# Patient Record
Sex: FEMALE | Race: WHITE | NOT HISPANIC OR LATINO | Employment: UNEMPLOYED
[De-identification: names, ages, dates, MRNs, and addresses within clinical notes are randomized per-mention and may not be internally consistent; named-entity substitution may affect disease eponyms.]

---

## 2017-03-22 ENCOUNTER — HOSPITAL (OUTPATIENT)
Dept: OTHER | Age: 62
End: 2017-03-22
Attending: ALLERGY & IMMUNOLOGY

## 2017-03-22 LAB
25(OH)D3+25(OH)D2 SERPL-MCNC: 40.2 NG/ML (ref 30–100)
ALBUMIN SERPL-MCNC: 3.7 GM/DL (ref 3.6–5.1)
ALBUMIN/GLOB SERPL: 0.8 {RATIO} (ref 1–2.4)
ALP SERPL-CCNC: 95 UNIT/L (ref 45–117)
ALT SERPL-CCNC: 12 UNIT/L
AMORPH SED URNS QL MICRO: ABNORMAL
ANALYZER ANC (IANC): ABNORMAL
ANION GAP SERPL CALC-SCNC: 14 MMOL/L (ref 10–20)
APPEARANCE UR: CLEAR
AST SERPL-CCNC: 11 UNIT/L
BASOPHILS # BLD: 0 THOUSAND/MCL (ref 0–0.3)
BASOPHILS NFR BLD: 0 %
BILIRUB SERPL-MCNC: 0.4 MG/DL (ref 0.2–1)
BILIRUB UR QL: NEGATIVE
BUN SERPL-MCNC: 21 MG/DL (ref 6–20)
BUN/CREAT SERPL: 19 (ref 7–25)
CALCIUM SERPL-MCNC: 9.3 MG/DL (ref 8.4–10.2)
CAOX CRY URNS QL MICRO: ABNORMAL
CHLORIDE: 103 MMOL/L (ref 98–107)
CHOLEST SERPL-MCNC: 182 MG/DL
CHOLEST/HDLC SERPL: 3.9 {RATIO}
CO2 SERPL-SCNC: 26 MMOL/L (ref 21–32)
COLOR UR: ABNORMAL
CREAT SERPL-MCNC: 1.11 MG/DL (ref 0.51–0.95)
DIFFERENTIAL METHOD BLD: ABNORMAL
EOSINOPHIL # BLD: 0.3 THOUSAND/MCL (ref 0.1–0.5)
EOSINOPHIL NFR BLD: 2 %
EPITH CASTS #/AREA URNS LPF: ABNORMAL /[LPF]
ERYTHROCYTE [DISTWIDTH] IN BLOOD: 13.3 % (ref 11–15)
FATTY CASTS #/AREA URNS LPF: ABNORMAL /[LPF]
GLOBULIN SER-MCNC: 4.4 GM/DL (ref 2–4)
GLUCOSE SERPL-MCNC: 107 MG/DL (ref 65–99)
GLUCOSE UR-MCNC: NEGATIVE MG/DL
GLYCOHEMOGLOBIN: 5.9 % (ref 4.5–5.6)
GRAN CASTS #/AREA URNS LPF: ABNORMAL /[LPF]
HDLC SERPL-MCNC: 47 MG/DL
HEMATOCRIT: 34.1 % (ref 36–46.5)
HGB BLD-MCNC: 11.4 GM/DL (ref 12–15.5)
HGB UR QL: NEGATIVE
HYALINE CASTS #/AREA URNS LPF: ABNORMAL /[LPF]
KETONES UR-MCNC: NEGATIVE MG/DL
LDLC SERPL CALC-MCNC: 101 MG/DL
LENGTH OF FAST TIME PATIENT: ABNORMAL HR
LENGTH OF FAST TIME PATIENT: ABNORMAL HR
LEUKOCYTE ESTERASE UR QL STRIP: NEGATIVE
LYMPHOCYTES # BLD: 3.1 THOUSAND/MCL (ref 1–4)
LYMPHOCYTES NFR BLD: 23 %
MCH RBC QN AUTO: 27.1 PG (ref 26–34)
MCHC RBC AUTO-ENTMCNC: 33.4 GM/DL (ref 32–36.5)
MCV RBC AUTO: 81.2 FL (ref 78–100)
MICROSCOPIC (MT): ABNORMAL
MIXED CELL CASTS #/AREA URNS LPF: ABNORMAL /[LPF]
MONOCYTES # BLD: 0.6 THOUSAND/MCL (ref 0.3–0.9)
MONOCYTES NFR BLD: 4 %
MUCOUS THREADS URNS QL MICRO: ABNORMAL
NEUTROPHILS # BLD: 9.2 THOUSAND/MCL (ref 1.8–7.7)
NEUTROPHILS NFR BLD: 71 %
NEUTS SEG NFR BLD: ABNORMAL %
NITRITE UR QL: NEGATIVE
NONHDLC SERPL-MCNC: 135 MG/DL
PERCENT NRBC: ABNORMAL
PH UR: 5 UNIT (ref 5–7)
PLATELET # BLD: 286 THOUSAND/MCL (ref 140–450)
POTASSIUM SERPL-SCNC: 3.7 MMOL/L (ref 3.4–5.1)
PROT SERPL-MCNC: 8.1 GM/DL (ref 6.4–8.2)
PROT UR QL: NEGATIVE MG/DL
RBC # BLD: 4.2 MILLION/MCL (ref 4–5.2)
RBC CASTS #/AREA URNS LPF: ABNORMAL /[LPF]
RENAL EPI CELLS #/AREA URNS HPF: ABNORMAL /[HPF]
SODIUM SERPL-SCNC: 139 MMOL/L (ref 135–145)
SP GR UR: 1.01 (ref 1–1.03)
SPECIMEN SOURCE: ABNORMAL
SPERM URNS QL MICRO: ABNORMAL
T VAGINALIS URNS QL MICRO: ABNORMAL
T3 SERPL-MCNC: 0.91 NG/ML (ref 0.6–1.81)
T4 SERPL-MCNC: 7.9 MCG/DL (ref 4.7–13.3)
TACROLIMUS BLD-MCNC: 5 NG/ML (ref 5–20)
TRI-PHOS CRY URNS QL MICRO: ABNORMAL
TRIGLYCERIDE (TRIGP): 171 MG/DL
TSH SERPL-ACNC: 3.2 MCUNIT/ML (ref 0.35–5)
URATE CRY URNS QL MICRO: ABNORMAL
URNS CMNT MICRO: ABNORMAL
UROBILINOGEN UR QL: 0.2 MG/DL (ref 0–1)
WAXY CASTS #/AREA URNS LPF: ABNORMAL /[LPF]
WBC # BLD: 13.1 THOUSAND/MCL (ref 4.2–11)
WBC CASTS #/AREA URNS LPF: ABNORMAL /[LPF]
YEAST HYPHAE URNS QL MICRO: ABNORMAL
YEAST URNS QL MICRO: ABNORMAL

## 2017-06-20 ENCOUNTER — HOSPITAL (OUTPATIENT)
Dept: OTHER | Age: 62
End: 2017-06-20

## 2017-06-20 ENCOUNTER — CHARTING TRANS (OUTPATIENT)
Dept: OTHER | Age: 62
End: 2017-06-20

## 2017-07-18 ENCOUNTER — HOSPITAL (OUTPATIENT)
Dept: OTHER | Age: 62
End: 2017-07-18

## 2018-05-31 ENCOUNTER — HOSPITAL (OUTPATIENT)
Dept: OTHER | Age: 63
End: 2018-05-31
Attending: ALLERGY & IMMUNOLOGY

## 2018-05-31 LAB
25(OH)D3+25(OH)D2 SERPL-MCNC: 26.5 NG/ML (ref 30–100)
ALBUMIN SERPL-MCNC: 3.8 GM/DL (ref 3.6–5.1)
ALBUMIN/GLOB SERPL: 0.8 {RATIO} (ref 1–2.4)
ALP SERPL-CCNC: 101 UNIT/L (ref 45–117)
ALT SERPL-CCNC: 17 UNIT/L
AMORPH SED URNS QL MICRO: NORMAL
ANALYZER ANC (IANC): ABNORMAL
ANION GAP SERPL CALC-SCNC: 17 MMOL/L (ref 10–20)
APPEARANCE UR: CLEAR
AST SERPL-CCNC: 13 UNIT/L
BACTERIA #/AREA URNS HPF: NORMAL /HPF
BASOPHILS # BLD: 0 THOUSAND/MCL (ref 0–0.3)
BASOPHILS NFR BLD: 0 %
BILIRUB SERPL-MCNC: 0.6 MG/DL (ref 0.2–1)
BILIRUB UR QL: NEGATIVE
BUN SERPL-MCNC: 29 MG/DL (ref 6–20)
BUN/CREAT SERPL: 19 (ref 7–25)
CALCIUM SERPL-MCNC: 9.6 MG/DL (ref 8.4–10.2)
CAOX CRY URNS QL MICRO: NORMAL
CHLORIDE: 103 MMOL/L (ref 98–107)
CHOLEST SERPL-MCNC: 201 MG/DL
CHOLEST/HDLC SERPL: 4.5 {RATIO}
CO2 SERPL-SCNC: 24 MMOL/L (ref 21–32)
COLOR UR: YELLOW
CREAT SERPL-MCNC: 1.56 MG/DL (ref 0.51–0.95)
DIFFERENTIAL METHOD BLD: ABNORMAL
EOSINOPHIL # BLD: 0.3 THOUSAND/MCL (ref 0.1–0.5)
EOSINOPHIL NFR BLD: 2 %
EPITH CASTS #/AREA URNS LPF: NORMAL /[LPF]
ERYTHROCYTE [DISTWIDTH] IN BLOOD: 13.3 % (ref 11–15)
FATTY CASTS #/AREA URNS LPF: NORMAL /[LPF]
GLOBULIN SER-MCNC: 4.5 GM/DL (ref 2–4)
GLUCOSE SERPL-MCNC: 122 MG/DL (ref 65–99)
GLUCOSE UR-MCNC: NEGATIVE MG/DL
GLYCOHEMOGLOBIN: 5.6 % (ref 4.5–5.6)
GRAN CASTS #/AREA URNS LPF: NORMAL /[LPF]
HDLC SERPL-MCNC: 45 MG/DL
HEMATOCRIT: 36.4 % (ref 36–46.5)
HGB BLD-MCNC: 11.7 GM/DL (ref 12–15.5)
HGB UR QL: NEGATIVE
HYALINE CASTS #/AREA URNS LPF: NORMAL /LPF (ref 0–5)
KETONES UR-MCNC: NEGATIVE MG/DL
LDLC SERPL CALC-MCNC: 124 MG/DL
LENGTH OF FAST TIME PATIENT: ABNORMAL HR
LENGTH OF FAST TIME PATIENT: ABNORMAL HR
LEUKOCYTE ESTERASE UR QL STRIP: NEGATIVE
LYMPHOCYTES # BLD: 2.7 THOUSAND/MCL (ref 1–4)
LYMPHOCYTES NFR BLD: 25 %
MCH RBC QN AUTO: 26.9 PG (ref 26–34)
MCHC RBC AUTO-ENTMCNC: 32.1 GM/DL (ref 32–36.5)
MCV RBC AUTO: 83.7 FL (ref 78–100)
MIXED CELL CASTS #/AREA URNS LPF: NORMAL /[LPF]
MONOCYTES # BLD: 0.7 THOUSAND/MCL (ref 0.3–0.9)
MONOCYTES NFR BLD: 6 %
MUCOUS THREADS URNS QL MICRO: PRESENT
NEUTROPHILS # BLD: 7.5 THOUSAND/MCL (ref 1.8–7.7)
NEUTROPHILS NFR BLD: 67 %
NEUTS SEG NFR BLD: ABNORMAL %
NITRITE UR QL: NEGATIVE
NONHDLC SERPL-MCNC: 156 MG/DL
NRBC (NRBCRE): ABNORMAL
PH UR: 5 UNIT (ref 5–7)
PLATELET # BLD: 284 THOUSAND/MCL (ref 140–450)
POTASSIUM SERPL-SCNC: 3.6 MMOL/L (ref 3.4–5.1)
PROT SERPL-MCNC: 8.3 GM/DL (ref 6.4–8.2)
PROT UR QL: NEGATIVE MG/DL
RBC # BLD: 4.35 MILLION/MCL (ref 4–5.2)
RBC #/AREA URNS HPF: NORMAL /HPF (ref 0–3)
RBC CASTS #/AREA URNS LPF: NORMAL /[LPF]
RENAL EPI CELLS #/AREA URNS HPF: NORMAL /[HPF]
SODIUM SERPL-SCNC: 140 MMOL/L (ref 135–145)
SP GR UR: 1.02 (ref 1–1.03)
SPECIMEN SOURCE: NORMAL
SPERM URNS QL MICRO: NORMAL
SQUAMOUS #/AREA URNS HPF: NORMAL /HPF (ref 0–5)
T VAGINALIS URNS QL MICRO: NORMAL
T3 SERPL-MCNC: 0.8 NG/ML (ref 0.6–1.81)
T4 SERPL-MCNC: 8.5 MCG/DL (ref 4.7–13.3)
TACROLIMUS BLD-MCNC: 5.9 NG/ML (ref 5–20)
TRI-PHOS CRY URNS QL MICRO: NORMAL
TRIGLYCERIDE (TRIGP): 158 MG/DL
TSH SERPL-ACNC: 4.18 MCUNIT/ML (ref 0.35–5)
URATE CRY URNS QL MICRO: NORMAL
URINE REFLEX: NORMAL
URNS CMNT MICRO: NORMAL
UROBILINOGEN UR QL: 0.2 MG/DL (ref 0–1)
WAXY CASTS #/AREA URNS LPF: NORMAL /[LPF]
WBC # BLD: 11.2 THOUSAND/MCL (ref 4.2–11)
WBC #/AREA URNS HPF: NORMAL /HPF (ref 0–5)
WBC CASTS #/AREA URNS LPF: NORMAL /[LPF]
YEAST HYPHAE URNS QL MICRO: NORMAL
YEAST URNS QL MICRO: NORMAL

## 2019-03-14 ENCOUNTER — HOSPITAL (OUTPATIENT)
Dept: OTHER | Age: 64
End: 2019-03-14
Attending: ALLERGY & IMMUNOLOGY

## 2019-03-14 LAB
25(OH)D3+25(OH)D2 SERPL-MCNC: 22.5 NG/ML (ref 30–100)
25(OH)D3+25(OH)D2 SERPL-MCNC: 22.5 NG/ML (ref 30–100)
25(OH)D3+25(OH)D2 SERPL-MCNC: ABNORMAL NG/ML
ALBUMIN SERPL-MCNC: 3.5 G/DL (ref 3.6–5.1)
ALBUMIN SERPL-MCNC: 3.5 GM/DL (ref 3.6–5.1)
ALBUMIN/GLOB SERPL: 0.8 {RATIO} (ref 1–2.4)
ALBUMIN/GLOB SERPL: 0.8 {RATIO} (ref 1–2.4)
ALP SERPL-CCNC: 108 UNIT/L (ref 45–117)
ALP SERPL-CCNC: 108 UNITS/L (ref 45–117)
ALT SERPL-CCNC: 16 UNIT/L
ALT SERPL-CCNC: 16 UNITS/L
AMORPH SED URNS QL MICRO: ABNORMAL
AMORPH SED URNS QL MICRO: ABNORMAL
ANALYZER ANC (IANC): ABNORMAL
ANALYZER ANC (IANC): ABNORMAL
ANION GAP SERPL CALC-SCNC: 16 MMOL/L (ref 10–20)
ANION GAP SERPL CALC-SCNC: 16 MMOL/L (ref 10–20)
APPEARANCE UR: CLEAR
APPEARANCE UR: CLEAR
AST SERPL-CCNC: 15 UNIT/L
AST SERPL-CCNC: 15 UNITS/L
BASOPHILS # BLD: 0.1 K/MCL (ref 0–0.3)
BASOPHILS # BLD: 0.1 THOUSAND/MCL (ref 0–0.3)
BASOPHILS NFR BLD: 1 %
BASOPHILS NFR BLD: 1 %
BILIRUB SERPL-MCNC: 0.5 MG/DL (ref 0.2–1)
BILIRUB SERPL-MCNC: 0.5 MG/DL (ref 0.2–1)
BILIRUB UR QL: NEGATIVE
BILIRUB UR QL: NEGATIVE
BUN SERPL-MCNC: 27 MG/DL (ref 6–20)
BUN SERPL-MCNC: 27 MG/DL (ref 6–20)
BUN/CREAT SERPL: 23 (ref 7–25)
BUN/CREAT SERPL: 23 (ref 7–25)
CALCIUM SERPL-MCNC: 8.9 MG/DL (ref 8.4–10.2)
CALCIUM SERPL-MCNC: 8.9 MG/DL (ref 8.4–10.2)
CAOX CRY URNS QL MICRO: ABNORMAL
CAOX CRY URNS QL MICRO: ABNORMAL
CHLORIDE SERPL-SCNC: 99 MMOL/L (ref 98–107)
CHLORIDE: 99 MMOL/L (ref 98–107)
CHOLEST SERPL-MCNC: 197 MG/DL
CHOLEST SERPL-MCNC: 197 MG/DL
CHOLEST SERPL-MCNC: ABNORMAL MG/DL
CHOLEST/HDLC SERPL: 5.6 {RATIO}
CHOLEST/HDLC SERPL: 5.6 {RATIO}
CO2 SERPL-SCNC: 24 MMOL/L (ref 21–32)
CO2 SERPL-SCNC: 24 MMOL/L (ref 21–32)
COLOR UR: ABNORMAL
COLOR UR: ABNORMAL
CREAT SERPL-MCNC: 1.2 MG/DL (ref 0.51–0.95)
CREAT SERPL-MCNC: 1.2 MG/DL (ref 0.51–0.95)
DIFFERENTIAL METHOD BLD: ABNORMAL
DIFFERENTIAL METHOD BLD: ABNORMAL
EOSINOPHIL # BLD: 0.4 K/MCL (ref 0.1–0.5)
EOSINOPHIL # BLD: 0.4 THOUSAND/MCL (ref 0.1–0.5)
EOSINOPHIL NFR BLD: 4 %
EOSINOPHIL NFR BLD: 4 %
EPITH CASTS #/AREA URNS LPF: ABNORMAL /[LPF]
EPITH CASTS #/AREA URNS LPF: ABNORMAL /[LPF]
ERYTHROCYTE [DISTWIDTH] IN BLOOD: 13.2 % (ref 11–15)
ERYTHROCYTE [DISTWIDTH] IN BLOOD: 13.2 % (ref 11–15)
FATTY CASTS #/AREA URNS LPF: ABNORMAL /[LPF]
FATTY CASTS #/AREA URNS LPF: ABNORMAL /[LPF]
GLOBULIN SER-MCNC: 4.5 G/DL (ref 2–4)
GLOBULIN SER-MCNC: 4.5 GM/DL (ref 2–4)
GLUCOSE SERPL-MCNC: 108 MG/DL (ref 65–99)
GLUCOSE SERPL-MCNC: 108 MG/DL (ref 65–99)
GLUCOSE UR-MCNC: NEGATIVE MG/DL
GLUCOSE UR-MCNC: NEGATIVE MG/DL
GLYCOHEMOGLOBIN: 6 % (ref 4.5–5.6)
GRAN CASTS #/AREA URNS LPF: ABNORMAL /[LPF]
GRAN CASTS #/AREA URNS LPF: ABNORMAL /[LPF]
HBA1C MFR BLD: 10.0           24 %
HBA1C MFR BLD: 6 % (ref 4.5–5.6)
HBA1C MFR BLD: 6.0            126 %
HBA1C MFR BLD: 6.5            14 %
HBA1C MFR BLD: 7.0            154 %
HBA1C MFR BLD: 7.5            169 %
HBA1C MFR BLD: 8.0            183 %
HBA1C MFR BLD: 8.5            197 %
HBA1C MFR BLD: 9.0            212 %
HBA1C MFR BLD: 9.5            226 %
HBA1C MFR BLD: ABNORMAL %
HCT VFR BLD CALC: 35.4 % (ref 36–46.5)
HDLC SERPL-MCNC: 35 MG/DL
HDLC SERPL-MCNC: 35 MG/DL
HDLC SERPL-MCNC: ABNORMAL MG/DL
HEMATOCRIT: 35.4 % (ref 36–46.5)
HGB BLD-MCNC: 11.4 G/DL (ref 12–15.5)
HGB BLD-MCNC: 11.4 GM/DL (ref 12–15.5)
HGB UR QL: NEGATIVE
HGB UR QL: NEGATIVE
HYALINE CASTS #/AREA URNS LPF: ABNORMAL /[LPF]
HYALINE CASTS #/AREA URNS LPF: ABNORMAL /[LPF]
IMM GRANULOCYTES # BLD AUTO: 0 K/MCL (ref 0–0.2)
IMM GRANULOCYTES # BLD AUTO: 0 THOUSAND/MCL (ref 0–0.2)
IMM GRANULOCYTES NFR BLD: 0 %
IMM GRANULOCYTES NFR BLD: 0 %
KETONES UR-MCNC: NEGATIVE MG/DL
KETONES UR-MCNC: NEGATIVE MG/DL
LDLC SERPL CALC-MCNC: 104 MG/DL
LDLC SERPL CALC-MCNC: 104 MG/DL
LDLC SERPL CALC-MCNC: ABNORMAL MG/DL
LENGTH OF FAST TIME PATIENT: ABNORMAL HR
LENGTH OF FAST TIME PATIENT: ABNORMAL HR
LENGTH OF FAST TIME PATIENT: ABNORMAL HRS
LENGTH OF FAST TIME PATIENT: ABNORMAL HRS
LEUKOCYTE ESTERASE UR QL STRIP: NEGATIVE
LEUKOCYTE ESTERASE UR QL STRIP: NEGATIVE
LYMPHOCYTES # BLD: 3.7 K/MCL (ref 1–4)
LYMPHOCYTES # BLD: 3.7 THOUSAND/MCL (ref 1–4)
LYMPHOCYTES NFR BLD: 35 %
LYMPHOCYTES NFR BLD: 35 %
MCH RBC QN AUTO: 26.1 PG (ref 26–34)
MCH RBC QN AUTO: 26.1 PG (ref 26–34)
MCHC RBC AUTO-ENTMCNC: 32.2 G/DL (ref 32–36.5)
MCHC RBC AUTO-ENTMCNC: 32.2 GM/DL (ref 32–36.5)
MCV RBC AUTO: 81 FL (ref 78–100)
MCV RBC AUTO: 81 FL (ref 78–100)
MICROSCOPIC (MT): ABNORMAL
MICROSCOPIC (MT): ABNORMAL
MIXED CELL CASTS #/AREA URNS LPF: ABNORMAL /[LPF]
MIXED CELL CASTS #/AREA URNS LPF: ABNORMAL /[LPF]
MONOCYTES # BLD: 0.6 K/MCL (ref 0.3–0.9)
MONOCYTES # BLD: 0.6 THOUSAND/MCL (ref 0.3–0.9)
MONOCYTES NFR BLD: 6 %
MONOCYTES NFR BLD: 6 %
MUCOUS THREADS URNS QL MICRO: ABNORMAL
MUCOUS THREADS URNS QL MICRO: ABNORMAL
NEUTROPHILS # BLD: 5.7 K/MCL (ref 1.8–7.7)
NEUTROPHILS # BLD: 5.7 THOUSAND/MCL (ref 1.8–7.7)
NEUTROPHILS NFR BLD: 54 %
NEUTROPHILS NFR BLD: 54 %
NEUTS SEG NFR BLD: ABNORMAL %
NEUTS SEG NFR BLD: ABNORMAL %
NITRITE UR QL: NEGATIVE
NITRITE UR QL: NEGATIVE
NONHDLC SERPL-MCNC: 162 MG/DL
NONHDLC SERPL-MCNC: 162 MG/DL
NONHDLC SERPL-MCNC: ABNORMAL MG/DL
NRBC (NRBCRE): 0 /100 WBC
NRBC (NRBCRE): 0 /100 WBC
PH UR: 5 UNIT (ref 5–7)
PH UR: 5 UNITS (ref 5–7)
PLATELET # BLD: 288 K/MCL (ref 140–450)
PLATELET # BLD: 288 THOUSAND/MCL (ref 140–450)
POTASSIUM SERPL-SCNC: 3.4 MMOL/L (ref 3.4–5.1)
POTASSIUM SERPL-SCNC: 3.4 MMOL/L (ref 3.4–5.1)
PROT SERPL-MCNC: 8 G/DL (ref 6.4–8.2)
PROT SERPL-MCNC: 8 GM/DL (ref 6.4–8.2)
PROT UR QL: NEGATIVE MG/DL
PROT UR QL: NEGATIVE MG/DL
RBC # BLD: 4.37 MIL/MCL (ref 4–5.2)
RBC # BLD: 4.37 MILLION/MCL (ref 4–5.2)
RBC CASTS #/AREA URNS LPF: ABNORMAL /[LPF]
RBC CASTS #/AREA URNS LPF: ABNORMAL /[LPF]
RENAL EPI CELLS #/AREA URNS HPF: ABNORMAL /[HPF]
RENAL EPI CELLS #/AREA URNS HPF: ABNORMAL /[HPF]
SODIUM SERPL-SCNC: 136 MMOL/L (ref 135–145)
SODIUM SERPL-SCNC: 136 MMOL/L (ref 135–145)
SP GR UR: 1.01 (ref 1–1.03)
SP GR UR: 1.01 (ref 1–1.03)
SPECIMEN SOURCE: ABNORMAL
SPECIMEN SOURCE: ABNORMAL
SPERM URNS QL MICRO: ABNORMAL
SPERM URNS QL MICRO: ABNORMAL
T VAGINALIS URNS QL MICRO: ABNORMAL
T VAGINALIS URNS QL MICRO: ABNORMAL
T3 SERPL-MCNC: 0.98 NG/ML (ref 0.6–1.81)
T3 SERPL-MCNC: 0.98 NG/ML (ref 0.6–1.81)
T4 SERPL-MCNC: 8.5 MCG/DL (ref 4.7–13.3)
T4 SERPL-MCNC: 8.5 MCG/DL (ref 4.7–13.3)
TACROLIMUS BLD-MCNC: 5.5 NG/ML (ref 5–20)
TACROLIMUS BLD-MCNC: 5.5 NG/ML (ref 5–20)
TACROLIMUS BLD-MCNC: NORMAL NG/ML
TRI-PHOS CRY URNS QL MICRO: ABNORMAL
TRI-PHOS CRY URNS QL MICRO: ABNORMAL
TRIGL SERPL-MCNC: 288 MG/DL
TRIGL SERPL-MCNC: ABNORMAL MG/DL
TRIGLYCERIDE (TRIGP): 288 MG/DL
TSH SERPL-ACNC: 3.88 MCUNIT/ML (ref 0.35–5)
TSH SERPL-ACNC: 3.88 MCUNITS/ML (ref 0.35–5)
URATE CRY URNS QL MICRO: ABNORMAL
URATE CRY URNS QL MICRO: ABNORMAL
URNS CMNT MICRO: ABNORMAL
URNS CMNT MICRO: ABNORMAL
UROBILINOGEN UR QL: 0.2 MG/DL (ref 0–1)
UROBILINOGEN UR QL: 0.2 MG/DL (ref 0–1)
WAXY CASTS #/AREA URNS LPF: ABNORMAL /[LPF]
WAXY CASTS #/AREA URNS LPF: ABNORMAL /[LPF]
WBC # BLD: 10.5 K/MCL (ref 4.2–11)
WBC # BLD: 10.5 THOUSAND/MCL (ref 4.2–11)
WBC CASTS #/AREA URNS LPF: ABNORMAL /[LPF]
WBC CASTS #/AREA URNS LPF: ABNORMAL /[LPF]
YEAST HYPHAE URNS QL MICRO: ABNORMAL
YEAST HYPHAE URNS QL MICRO: ABNORMAL
YEAST URNS QL MICRO: ABNORMAL
YEAST URNS QL MICRO: ABNORMAL

## 2019-10-29 ENCOUNTER — HOSPITAL (OUTPATIENT)
Dept: OTHER | Age: 64
End: 2019-10-29
Attending: ALLERGY & IMMUNOLOGY

## 2019-12-28 ENCOUNTER — HOSPITAL (OUTPATIENT)
Dept: OTHER | Age: 64
End: 2019-12-28
Attending: ALLERGY & IMMUNOLOGY

## 2019-12-28 LAB
25(OH)D3+25(OH)D2 SERPL-MCNC: 26.3 NG/ML (ref 30–100)
25(OH)D3+25(OH)D2 SERPL-MCNC: ABNORMAL NG/ML
ALBUMIN SERPL-MCNC: 3.8 G/DL (ref 3.6–5.1)
ALBUMIN/GLOB SERPL: 1 {RATIO} (ref 1–2.4)
ALP SERPL-CCNC: 95 UNITS/L (ref 45–117)
ALT SERPL-CCNC: 15 UNITS/L
AMORPH SED URNS QL MICRO: ABNORMAL
ANALYZER ANC (IANC): ABNORMAL
ANION GAP SERPL CALC-SCNC: 8 MMOL/L (ref 10–20)
APPEARANCE UR: CLEAR
AST SERPL-CCNC: 12 UNITS/L
BACTERIA #/AREA URNS HPF: ABNORMAL /HPF
BASOPHILS # BLD: 0.1 K/MCL (ref 0–0.3)
BASOPHILS NFR BLD: 1 %
BILIRUB SERPL-MCNC: 0.5 MG/DL (ref 0.2–1)
BILIRUB UR QL: NEGATIVE
BUN SERPL-MCNC: 33 MG/DL (ref 6–20)
BUN/CREAT SERPL: 24 (ref 7–25)
CALCIUM SERPL-MCNC: 9.4 MG/DL (ref 8.4–10.2)
CAOX CRY URNS QL MICRO: ABNORMAL
CHLORIDE SERPL-SCNC: 103 MMOL/L (ref 98–107)
CHOLEST SERPL-MCNC: 222 MG/DL
CHOLEST SERPL-MCNC: ABNORMAL MG/DL
CHOLEST/HDLC SERPL: 5.7 {RATIO}
CO2 SERPL-SCNC: 27 MMOL/L (ref 21–32)
COLOR UR: ABNORMAL
CREAT SERPL-MCNC: 1.36 MG/DL (ref 0.51–0.95)
DIFFERENTIAL METHOD BLD: ABNORMAL
EOSINOPHIL # BLD: 0.3 K/MCL (ref 0.1–0.5)
EOSINOPHIL NFR BLD: 3 %
EPITH CASTS #/AREA URNS LPF: ABNORMAL /[LPF]
ERYTHROCYTE [DISTWIDTH] IN BLOOD: 13.2 % (ref 11–15)
FATTY CASTS #/AREA URNS LPF: ABNORMAL /[LPF]
GLOBULIN SER-MCNC: 4 G/DL (ref 2–4)
GLUCOSE SERPL-MCNC: 120 MG/DL (ref 65–99)
GLUCOSE UR-MCNC: NEGATIVE MG/DL
GRAN CASTS #/AREA URNS LPF: ABNORMAL /[LPF]
HBA1C MFR BLD: 10.0           24 %
HBA1C MFR BLD: 6.0            126 %
HBA1C MFR BLD: 6.1 % (ref 4.5–5.6)
HBA1C MFR BLD: 6.5            14 %
HBA1C MFR BLD: 7.0            154 %
HBA1C MFR BLD: 7.5            169 %
HBA1C MFR BLD: 8.0            183 %
HBA1C MFR BLD: 8.5            197 %
HBA1C MFR BLD: 9.0            212 %
HBA1C MFR BLD: 9.5            226 %
HBA1C MFR BLD: ABNORMAL %
HCT VFR BLD CALC: 34.8 % (ref 36–46.5)
HDLC SERPL-MCNC: 39 MG/DL
HDLC SERPL-MCNC: ABNORMAL MG/DL
HGB BLD-MCNC: 11 G/DL (ref 12–15.5)
HGB UR QL: NEGATIVE
HYALINE CASTS #/AREA URNS LPF: ABNORMAL /LPF (ref 0–5)
IMM GRANULOCYTES # BLD AUTO: 0 K/MCL (ref 0–0.2)
IMM GRANULOCYTES NFR BLD: 0 %
KETONES UR-MCNC: NEGATIVE MG/DL
LDLC SERPL CALC-MCNC: 123 MG/DL
LDLC SERPL CALC-MCNC: ABNORMAL MG/DL
LENGTH OF FAST TIME PATIENT: ABNORMAL HRS
LENGTH OF FAST TIME PATIENT: ABNORMAL HRS
LEUKOCYTE ESTERASE UR QL STRIP: NEGATIVE
LYMPHOCYTES # BLD: 2.8 K/MCL (ref 1–4)
LYMPHOCYTES NFR BLD: 28 %
MCH RBC QN AUTO: 26.3 PG (ref 26–34)
MCHC RBC AUTO-ENTMCNC: 31.6 G/DL (ref 32–36.5)
MCV RBC AUTO: 83.1 FL (ref 78–100)
MIXED CELL CASTS #/AREA URNS LPF: ABNORMAL /[LPF]
MONOCYTES # BLD: 0.6 K/MCL (ref 0.3–0.9)
MONOCYTES NFR BLD: 6 %
MUCOUS THREADS URNS QL MICRO: ABNORMAL
NEUTROPHILS # BLD: 6.2 K/MCL (ref 1.8–7.7)
NEUTROPHILS NFR BLD: 62 %
NEUTS SEG NFR BLD: ABNORMAL %
NITRITE UR QL: NEGATIVE
NONHDLC SERPL-MCNC: 183 MG/DL
NONHDLC SERPL-MCNC: ABNORMAL MG/DL
NRBC (NRBCRE): 0 /100 WBC
PH UR: 5 UNITS (ref 5–7)
PLATELET # BLD: 296 K/MCL (ref 140–450)
POTASSIUM SERPL-SCNC: 3.7 MMOL/L (ref 3.4–5.1)
PROT SERPL-MCNC: 7.8 G/DL (ref 6.4–8.2)
PROT UR QL: NEGATIVE MG/DL
RBC # BLD: 4.19 MIL/MCL (ref 4–5.2)
RBC #/AREA URNS HPF: ABNORMAL /HPF (ref 0–2)
RBC CASTS #/AREA URNS LPF: ABNORMAL /[LPF]
RENAL EPI CELLS #/AREA URNS HPF: ABNORMAL /[HPF]
SODIUM SERPL-SCNC: 134 MMOL/L (ref 135–145)
SP GR UR: 1.01 (ref 1–1.03)
SPECIMEN SOURCE: ABNORMAL
SPERM URNS QL MICRO: ABNORMAL
SQUAMOUS #/AREA URNS HPF: ABNORMAL /HPF (ref 0–5)
T VAGINALIS URNS QL MICRO: ABNORMAL
T3 SERPL-MCNC: 1.32 NG/ML (ref 0.6–1.81)
T4 SERPL-MCNC: 9.4 MCG/DL (ref 4.7–13.3)
TRI-PHOS CRY URNS QL MICRO: ABNORMAL
TRIGL SERPL-MCNC: 302 MG/DL
TRIGL SERPL-MCNC: ABNORMAL MG/DL
TSH SERPL-ACNC: 4.91 MCUNITS/ML (ref 0.35–5)
URATE CRY URNS QL MICRO: ABNORMAL
URINE REFLEX: ABNORMAL
URNS CMNT MICRO: ABNORMAL
UROBILINOGEN UR QL: 0.2 MG/DL (ref 0–1)
WAXY CASTS #/AREA URNS LPF: ABNORMAL /[LPF]
WBC # BLD: 10.1 K/MCL (ref 4.2–11)
WBC #/AREA URNS HPF: ABNORMAL /HPF (ref 0–5)
WBC CASTS #/AREA URNS LPF: ABNORMAL /[LPF]
YEAST HYPHAE URNS QL MICRO: ABNORMAL
YEAST URNS QL MICRO: ABNORMAL

## 2019-12-29 LAB
TACROLIMUS BLD-MCNC: 5.5 NG/ML (ref 5–20)
TACROLIMUS BLD-MCNC: NORMAL NG/ML

## 2020-12-22 ENCOUNTER — HOSPITAL ENCOUNTER (OUTPATIENT)
Dept: MAMMOGRAPHY | Age: 65
Discharge: HOME | End: 2020-12-22
Attending: ALLERGY & IMMUNOLOGY
Payer: COMMERCIAL

## 2020-12-22 ENCOUNTER — HOSPITAL ENCOUNTER (OUTPATIENT)
Dept: GENERAL RADIOLOGY | Age: 65
Discharge: HOME | End: 2020-12-22
Attending: ALLERGY & IMMUNOLOGY
Payer: COMMERCIAL

## 2020-12-22 DIAGNOSIS — R05.9 COUGH: ICD-10-CM

## 2020-12-22 DIAGNOSIS — Z12.31 ENCOUNTER FOR SCREENING MAMMOGRAM FOR MALIGNANT NEOPLASM OF BREAST: ICD-10-CM

## 2020-12-22 PROCEDURE — 77063 BREAST TOMOSYNTHESIS BI: CPT | Performed by: ALLERGY & IMMUNOLOGY

## 2020-12-22 PROCEDURE — 71046 X-RAY EXAM CHEST 2 VIEWS: CPT | Performed by: ALLERGY & IMMUNOLOGY

## 2020-12-22 PROCEDURE — 77067 SCR MAMMO BI INCL CAD: CPT | Performed by: ALLERGY & IMMUNOLOGY

## 2020-12-30 ENCOUNTER — HOSPITAL ENCOUNTER (OUTPATIENT)
Dept: ULTRASOUND IMAGING | Facility: HOSPITAL | Age: 65
Discharge: HOME | End: 2020-12-30
Attending: ALLERGY & IMMUNOLOGY
Payer: COMMERCIAL

## 2020-12-30 ENCOUNTER — HOSPITAL ENCOUNTER (OUTPATIENT)
Dept: MAMMOGRAPHY | Facility: HOSPITAL | Age: 65
Discharge: HOME | End: 2020-12-30
Attending: ALLERGY & IMMUNOLOGY
Payer: COMMERCIAL

## 2020-12-30 DIAGNOSIS — R92.8 ABNORMAL MAMMOGRAM: ICD-10-CM

## 2020-12-30 PROCEDURE — 76642 ULTRASOUND BREAST LIMITED: CPT | Performed by: ALLERGY & IMMUNOLOGY

## 2020-12-30 PROCEDURE — 77061 BREAST TOMOSYNTHESIS UNI: CPT | Performed by: ALLERGY & IMMUNOLOGY

## 2020-12-30 PROCEDURE — 77065 DX MAMMO INCL CAD UNI: CPT | Performed by: ALLERGY & IMMUNOLOGY

## 2021-06-17 ENCOUNTER — EXTERNAL RECORD (OUTPATIENT)
Dept: HEALTH INFORMATION MANAGEMENT | Facility: OTHER | Age: 66
End: 2021-06-17

## 2021-06-22 ENCOUNTER — HOSPITAL ENCOUNTER (OUTPATIENT)
Dept: CARDIOLOGY | Age: 66
Discharge: HOME | End: 2021-06-22
Attending: OTOLARYNGOLOGY

## 2021-06-22 DIAGNOSIS — I10 HTN (HYPERTENSION): ICD-10-CM

## 2021-06-22 DIAGNOSIS — R06.02 SOB (SHORTNESS OF BREATH): ICD-10-CM

## 2021-06-22 DIAGNOSIS — N18.4 CHRONIC KIDNEY DISEASE, STAGE IV (SEVERE) (CMD): ICD-10-CM

## 2021-06-22 PROCEDURE — 93306 TTE W/DOPPLER COMPLETE: CPT

## 2021-07-13 ENCOUNTER — APPOINTMENT (OUTPATIENT)
Dept: CARDIOLOGY | Age: 66
End: 2021-07-13
Attending: OTOLARYNGOLOGY

## 2022-05-19 ENCOUNTER — APPOINTMENT (OUTPATIENT)
Dept: URBAN - METROPOLITAN AREA CLINIC 248 | Age: 67
Setting detail: DERMATOLOGY
End: 2022-05-23

## 2022-05-19 DIAGNOSIS — L65.0 TELOGEN EFFLUVIUM: ICD-10-CM

## 2022-05-19 PROCEDURE — OTHER COUNSELING: OTHER

## 2022-05-19 PROCEDURE — 99213 OFFICE O/P EST LOW 20 MIN: CPT

## 2022-05-19 ASSESSMENT — LOCATION SIMPLE DESCRIPTION DERM: LOCATION SIMPLE: LEFT FOREHEAD

## 2022-05-19 ASSESSMENT — LOCATION ZONE DERM: LOCATION ZONE: FACE

## 2022-05-19 ASSESSMENT — LOCATION DETAILED DESCRIPTION DERM: LOCATION DETAILED: LEFT SUPERIOR MEDIAL FOREHEAD

## 2022-05-19 NOTE — PROCEDURE: COUNSELING
Detail Level: Zone
Patient Specific Counseling (Will Not Stick From Patient To Patient): She had Covid and timing is exact for Covid-associated TE.

## 2022-05-19 NOTE — HPI: HAIR LOSS
Previous Labs: Yes
How Did The Hair Loss Occur?: sudden in onset
How Severe Is Your Hair Loss?: moderate
What Hair Products Do You Use?: Nexus
When Were The Labs Drawn? (Drawn...): 5/16/2022
98.3

## 2022-06-08 ENCOUNTER — TELEPHONE (OUTPATIENT)
Dept: INFUSION THERAPY | Age: 67
End: 2022-06-08

## 2022-06-08 ENCOUNTER — CLINICAL ABSTRACT (OUTPATIENT)
Dept: INFUSION THERAPY | Age: 67
End: 2022-06-08

## 2022-06-08 PROBLEM — Z48.22 ENCOUNTER FOR AFTERCARE FOLLOWING KIDNEY TRANSPLANT: Status: ACTIVE | Noted: 2022-06-08

## 2022-06-09 ENCOUNTER — HOSPITAL ENCOUNTER (OUTPATIENT)
Dept: INFUSION THERAPY | Age: 67
Discharge: STILL ADMITTED OR ADMINISTRATIVE | End: 2022-06-09
Attending: INTERNAL MEDICINE

## 2022-06-09 VITALS
DIASTOLIC BLOOD PRESSURE: 75 MMHG | SYSTOLIC BLOOD PRESSURE: 145 MMHG | TEMPERATURE: 97.8 F | RESPIRATION RATE: 16 BRPM | OXYGEN SATURATION: 98 % | HEART RATE: 54 BPM

## 2022-06-09 DIAGNOSIS — Z48.22 ENCOUNTER FOR AFTERCARE FOLLOWING KIDNEY TRANSPLANT: Primary | ICD-10-CM

## 2022-06-09 PROCEDURE — M0220 HB TIXAGEV AND CILGAV INJ: HCPCS

## 2022-06-09 PROCEDURE — 10002800 HB RX 250 W HCPCS: Performed by: INTERNAL MEDICINE

## 2022-06-09 RX ORDER — TACROLIMUS 1 MG/1
4 CAPSULE ORAL EVERY MORNING
COMMUNITY

## 2022-06-09 RX ORDER — PANTOPRAZOLE SODIUM 40 MG/1
40 TABLET, DELAYED RELEASE ORAL DAILY
COMMUNITY

## 2022-06-09 RX ORDER — TAZAROTENE 1 MG/G
CREAM TOPICAL NIGHTLY
COMMUNITY

## 2022-06-09 RX ORDER — ALBUTEROL SULFATE 90 UG/1
2 AEROSOL, METERED RESPIRATORY (INHALATION)
OUTPATIENT
Start: 2022-06-09

## 2022-06-09 RX ORDER — ACETAMINOPHEN 325 MG/1
650 TABLET ORAL
Status: DISCONTINUED | OUTPATIENT
Start: 2022-06-09 | End: 2022-06-11 | Stop reason: HOSPADM

## 2022-06-09 RX ORDER — CHLORTHALIDONE 25 MG/1
25 TABLET ORAL DAILY
COMMUNITY

## 2022-06-09 RX ORDER — FAMOTIDINE 10 MG/ML
20 INJECTION, SOLUTION INTRAVENOUS
OUTPATIENT
Start: 2022-06-09

## 2022-06-09 RX ORDER — DIPHENHYDRAMINE HYDROCHLORIDE 50 MG/ML
50 INJECTION INTRAMUSCULAR; INTRAVENOUS
OUTPATIENT
Start: 2022-06-09

## 2022-06-09 RX ORDER — ACETAMINOPHEN 325 MG/1
650 TABLET ORAL EVERY 6 HOURS PRN
COMMUNITY

## 2022-06-09 RX ORDER — ACETAMINOPHEN 325 MG/1
650 TABLET ORAL
Status: CANCELLED | OUTPATIENT
Start: 2022-06-09

## 2022-06-09 RX ORDER — TACROLIMUS 1 MG/1
3 CAPSULE ORAL EVERY EVENING
COMMUNITY

## 2022-06-09 RX ORDER — ALLOPURINOL 100 MG/1
100 TABLET ORAL 2 TIMES DAILY
COMMUNITY

## 2022-06-09 RX ORDER — METHYLPREDNISOLONE SODIUM SUCCINATE 125 MG/2ML
125 INJECTION, POWDER, LYOPHILIZED, FOR SOLUTION INTRAMUSCULAR; INTRAVENOUS
OUTPATIENT
Start: 2022-06-09

## 2022-06-09 RX ORDER — SODIUM CHLORIDE 9 MG/ML
INJECTION, SOLUTION INTRAVENOUS CONTINUOUS PRN
OUTPATIENT
Start: 2022-06-09

## 2022-06-09 RX ORDER — METOPROLOL SUCCINATE 25 MG/1
25 TABLET, EXTENDED RELEASE ORAL EVERY EVENING
COMMUNITY

## 2022-06-09 RX ORDER — MYCOPHENOLATE MOFETIL 500 MG/1
500 TABLET ORAL 2 TIMES DAILY
COMMUNITY

## 2022-06-09 RX ADMIN — AZD7442 300 MG: KIT at 08:55

## 2022-06-09 ASSESSMENT — PAIN SCALES - GENERAL: PAINLEVEL_OUTOF10: 0

## 2023-07-13 ENCOUNTER — TELEPHONE (OUTPATIENT)
Dept: NEPHROLOGY | Facility: CLINIC | Age: 68
End: 2023-07-13

## 2023-07-13 DIAGNOSIS — N18.32 CHRONIC KIDNEY DISEASE (CKD) STAGE G3B/A1, MODERATELY DECREASED GLOMERULAR FILTRATION RATE (GFR) BETWEEN 30-44 ML/MIN/1.73 SQUARE METER AND ALBUMINURIA CREATININE RATIO LESS THAN 30 MG/G (HCC): Primary | ICD-10-CM

## 2023-07-13 DIAGNOSIS — Z48.22 ENCOUNTER FOR AFTERCARE FOLLOWING KIDNEY TRANSPLANT: ICD-10-CM

## 2023-07-13 NOTE — TELEPHONE ENCOUNTER
Pt asking for all order to be faxed to [address] [place] [address] in [place] [address] - pt does not fax #   Asking for this to be sent today - she is going there tomorrow morning

## 2023-07-13 NOTE — TELEPHONE ENCOUNTER
Spoke to pt. Let know orders were updated and faxed. Also let know should fast for 10-12 hrs. Verbalized understanding. Confirmation received.

## 2023-07-13 NOTE — TELEPHONE ENCOUNTER
Dr. Angie Amor, spoke with pt. States she saw you around March and you had ordered more recent labs than the ones I noted from last August. I had reordered them because they were not complete and thought they were the ones you wanted for her next visit. Just wanted to clarify. Please correct me if I'm wrong and I'll update pt. Thanks.

## 2023-07-13 NOTE — TELEPHONE ENCOUNTER
Reordered past labs MD had ordered while at [facility] d/t signature not showing up when printing order to fax. Printed new orders and faxed to [address] [place] [address] at [phone]. Pending confirmation.  Also updated lab orders to reflect \"[facility]\" instead of [facility].

## 2023-07-14 NOTE — TELEPHONE ENCOUNTER
Labs that I had ordered      Cbc  Cmp  Urineanalysis  Urine protein to creatinin ratio  Bk virus quantitative pcr serum  Cmv dna pcr  Ipth  Phosphorus  Magnesium  Tacrolimus level  Vitamin b12   Folate  Vitamin d (25 oh)   Uric acid   Tsh  Iron panel   Ferritin  Lipid panel

## 2023-07-14 NOTE — TELEPHONE ENCOUNTER
Added orders that had been missed. Faxed to [address] [place] [address] ([phone]). Confirmation received. Spoke to pt. Already had labs done this AM. Called [facility] to ask if can be added on to specimen from this AM. Gave verbal orders as fax had not come through on their end yet.

## 2023-07-14 NOTE — TELEPHONE ENCOUNTER
Dr. Hunter Jones, for the cmv dna pcr, did you just want to check for antibodies? IGG/IGM? Asking because Wolf said there was a few different options. I was asking them to add them on to labs she had done this AM so she won't have to go back. I put in for \"cmv quant dna pcr (plamsa)\"?

## 2023-07-18 ENCOUNTER — TELEPHONE (OUTPATIENT)
Dept: NEPHROLOGY | Facility: CLINIC | Age: 68
End: 2023-07-18

## 2023-07-18 ENCOUNTER — OFFICE VISIT (OUTPATIENT)
Facility: CLINIC | Age: 68
End: 2023-07-18

## 2023-07-18 VITALS — WEIGHT: 169 LBS | HEART RATE: 65 BPM | SYSTOLIC BLOOD PRESSURE: 125 MMHG | DIASTOLIC BLOOD PRESSURE: 70 MMHG

## 2023-07-18 DIAGNOSIS — E78.5 DYSLIPIDEMIA: ICD-10-CM

## 2023-07-18 DIAGNOSIS — N18.30 STAGE 3 CHRONIC KIDNEY DISEASE, UNSPECIFIED WHETHER STAGE 3A OR 3B CKD (HCC): Primary | ICD-10-CM

## 2023-07-18 DIAGNOSIS — I10 HYPERTENSION, UNSPECIFIED TYPE: ICD-10-CM

## 2023-07-18 DIAGNOSIS — I50.32 CHRONIC DIASTOLIC CONGESTIVE HEART FAILURE (HCC): ICD-10-CM

## 2023-07-18 DIAGNOSIS — D63.1 ANEMIA DUE TO STAGE 3 CHRONIC KIDNEY DISEASE, UNSPECIFIED WHETHER STAGE 3A OR 3B CKD: ICD-10-CM

## 2023-07-18 DIAGNOSIS — F32.A DEPRESSION, UNSPECIFIED DEPRESSION TYPE: ICD-10-CM

## 2023-07-18 DIAGNOSIS — Z48.22 ENCOUNTER FOR AFTERCARE FOLLOWING KIDNEY TRANSPLANT: ICD-10-CM

## 2023-07-18 DIAGNOSIS — M1A.9XX0 CHRONIC GOUT WITHOUT TOPHUS, UNSPECIFIED CAUSE, UNSPECIFIED SITE: ICD-10-CM

## 2023-07-18 DIAGNOSIS — N18.30 ANEMIA DUE TO STAGE 3 CHRONIC KIDNEY DISEASE, UNSPECIFIED WHETHER STAGE 3A OR 3B CKD: ICD-10-CM

## 2023-07-18 PROBLEM — D50.9 IRON DEFICIENCY ANEMIA, UNSPECIFIED: Status: ACTIVE | Noted: 2023-07-18

## 2023-07-18 PROCEDURE — 3074F SYST BP LT 130 MM HG: CPT | Performed by: INTERNAL MEDICINE

## 2023-07-18 PROCEDURE — 3078F DIAST BP <80 MM HG: CPT | Performed by: INTERNAL MEDICINE

## 2023-07-18 PROCEDURE — 99203 OFFICE O/P NEW LOW 30 MIN: CPT | Performed by: INTERNAL MEDICINE

## 2023-07-18 RX ORDER — PANTOPRAZOLE SODIUM 40 MG/1
40 TABLET, DELAYED RELEASE ORAL
COMMUNITY

## 2023-07-18 RX ORDER — LOSARTAN POTASSIUM 100 MG/1
100 TABLET ORAL DAILY
COMMUNITY

## 2023-07-18 RX ORDER — MYCOPHENOLATE MOFETIL 250 MG/1
1500 CAPSULE ORAL
COMMUNITY

## 2023-07-18 RX ORDER — ACETAMINOPHEN 325 MG/1
325 TABLET ORAL EVERY 6 HOURS PRN
COMMUNITY

## 2023-07-18 RX ORDER — TRAZODONE HYDROCHLORIDE 50 MG/1
50 TABLET ORAL NIGHTLY
COMMUNITY

## 2023-07-18 RX ORDER — TACROLIMUS 1 MG/1
1 CAPSULE ORAL 2 TIMES DAILY
COMMUNITY

## 2023-07-18 RX ORDER — CHLORTHALIDONE 25 MG/1
12.5 TABLET ORAL DAILY
COMMUNITY

## 2023-07-18 RX ORDER — ALLOPURINOL 100 MG/1
100 TABLET ORAL 2 TIMES DAILY
COMMUNITY

## 2023-07-18 RX ORDER — METOPROLOL SUCCINATE 25 MG/1
25 TABLET, EXTENDED RELEASE ORAL DAILY
COMMUNITY

## 2023-07-18 RX ORDER — ASPIRIN 325 MG
325 TABLET ORAL DAILY
COMMUNITY

## 2023-07-18 NOTE — PROGRESS NOTES
[facility] NEPHROLOGY CLINIC    Progress Note     Mackenzie Nelson    81 y/o with remote h/o reflux nephropathy s/p ureteral implantation with resultant renal failure requiring preemptive LRKT 2002( from her sister,  six antigen match at [facility] ),  now with CKD, HLD, Anemia, depression , gout , G1 DD, s/p covid for 5 mth in 2022. s/p monoclonal AB infusion. previously seen by nephrologists but now established care at [email or web address]. Was in [place] and now back   she has no PCP- her  Dr Nisha Berry manages most of her things. I have given names of PCP to her to establish care.'  after reviewing labs  in sept i had decreased her chlorthalidone to 12.5 mg/day. She is tolerating well, no edema and good BP    1/13/23: doing ok. feels fatigued and sleepy, thinks psych medication is contributing  7/18/23 had another episode of covid while travelling. Feeling v weak    PMH  h/o reflux nephropathy s/p ureteral implantation with resultant renal failure requiring preemptive LRKT 2002( from her sister,  six antigen match at [facility]     HISTORY:  Past Medical History:   Diagnosis Date    Essential hypertension       Past Surgical History:   Procedure Laterality Date    HYSTERECTOMY      56           Medications (Active prior to today's visit):  Current Outpatient Medications   Medication Sig Dispense Refill    allopurinol 100 MG Oral Tab Take 1 tablet (100 mg total) by mouth 2 (two) times daily. aspirin 325 MG Oral Tab Take 1 tablet (325 mg total) by mouth daily. losartan 100 MG Oral Tab Take 1 tablet (100 mg total) by mouth daily. chlorthalidone 25 MG Oral Tab Take 0.5 tablets (12.5 mg total) by mouth daily. metoprolol succinate ER 25 MG Oral Tablet 24 Hr Take 1 tablet (25 mg total) by mouth daily. mycophenolate mofetil 250 MG Oral Cap Take 6 capsules (1,500 mg total) by mouth 2 (two) times daily before meals.       pantoprazole 40 MG Oral Tab EC Take 1 tablet (40 mg total) by mouth every morning before breakfast.      tacrolimus 1 MG Oral Cap Take 1 capsule (1 mg total) by mouth 2 (two) times daily. Take 4 tables morning  Take 3 tables evening      sertraline 50 MG Oral Tab Take 1.5 tablets (75 mg total) by mouth daily. traZODone 50 MG Oral Tab Take 1 tablet (50 mg total) by mouth nightly. acetaminophen 325 MG Oral Tab Take 1 tablet (325 mg total) by mouth every 6 (six) hours as needed for Pain.          Allergies:    Dairy Products          HIVES, DIARRHEA  Codeine                 NAUSEA AND VOMITING      ROS:     Constitutional:  Negative for decreased activity, fever, irritability and lethargy  ENMT:  Negative for ear drainage, hearing loss and nasal drainage  Eyes:  Negative for eye discharge and vision loss  Cardiovascular:  Negative for chest pain, sob  Respiratory:  Negative for cough, dyspnea and wheezing  Gastrointestinal:  Negative for abdominal pain, constipation  Genitourinary:  Negative for dysuria and hematuria  Endocrine:  Negative for abnormal sleep patterns  Hema/Lymph:  Negative for easy bleeding and easy bruising  Integumentary:  Negative for pruritus and rash  Musculoskeletal:  Negative for bone/joint symptoms  Neurological:  Negative for gait disturbance  Psychiatric:  Negative for inappropriate interaction and psychiatric symptoms       07/18/23  1109   BP: 125/70   Pulse: 65       PHYSICAL EXAM:   Constitutional: appears well hydrated alert and responsive   Head/Face: normocephalic  Eyes/Vision: normal extraocular motion is intact  Nose/Mouth/Throat:mucous membranes are moist   Neck/Thyroid: neck is supple without adenopathy  Respiratory:  lungs are clear to auscultation bilaterally  Cardiovascular: regular rate and rhythm   Abdomen: soft, non-tender, non-distended, BS normal  Skin/Hair: no unusual rashes present, no abnormal bruising noted  Musculoskeletal: no deformities  Extremities: no edema  Neurological:  Grossly normal      No results found for: NA, K, CL, CO2, BUN, CREATSERUM, CA, EGFRCR, ALB, PHOS, PTH      2512-4160 labs reviewed cr 1.2-1.5 mg/dl  sept and dec 2021- cr 1.4mg/dl, eGFR 38 ml/min  5/2022 -1.7 mg/dl   CMV/BKV 2021 negative  [place] UA 2021  tac level 6 in 2021  May 2022 labs cr 1.7 mg/dl , BUn 41,  eGFR  30 ml/min, hgb 10.3  11/8/2022: cr 1.5 mg/dl, eGFR 37 ml/min, all electrolytes ok, ldl 102, ipth 77, UPC negative, hgb 10.4, bland UA. low iron sat, vit b 12 ok, vit d ok, tac 6.6  7/18/2023 cr 1.3 mg/dl, eGFR 45 ml/min , k 3.9, mag 1.4, phosp 4.4, TSH 5.0 free t 4 1.1 (normal), hgb 11.1,iron saturation 12 %, ferritin 7, tacrolimus 5.3  , ,    All [facility] at [facility]     ASSESSMENT/PLAN:   Assessment   1. Stage 3 chronic kidney disease, unspecified whether stage 3a or 3b CKD ([facility] [address]  - RENAL FUNCTION PANEL; Future  - CBC WITH DIFFERENTIAL WITH PLATELET; Future  - URINALYSIS WITH CULTURE REFLEX; Future  - MICROALB/CREAT RATIO, RANDOM URINE; Future  - MAGNESIUM; Future  - TACROLIMUS (IMMUNOASSAY); Future  - LIPID PANEL; Future  - IRON AND TIBC; Future  - FERRITIN; Future  - PTH, INTACT; Future    2. Encounter for aftercare following kidney transplant  - RENAL FUNCTION PANEL; Future  - CBC WITH DIFFERENTIAL WITH PLATELET; Future  - URINALYSIS WITH CULTURE REFLEX; Future  - MICROALB/CREAT RATIO, RANDOM URINE; Future  - MAGNESIUM; Future  - TACROLIMUS (IMMUNOASSAY); Future  - LIPID PANEL; Future  - IRON AND TIBC; Future  - FERRITIN; Future  - PTH, INTACT; Future    3. Hypertension, unspecified type    4. Anemia due to stage 3 chronic kidney disease, unspecified whether stage 3a or 3b CKD     5. Chronic diastolic congestive heart failure ([address]    6. Dyslipidemia    7. Depression, unspecified depression type    8.  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- with most recent cr 1.7 mg/dl, higher than her b/l of 1.5 mg/dl  [place] UA with no protein or blood in past  repeat labs still with elevated cr in sept (1.6 mg/dl)   decreased chlorthalidone to 12.5 mg/day  repeat labs in nov with improved cr back to her b/l 1.5 mg/dl,   Recent cr 1.3 mg/dl  Low mag-start OTC mag oxide 200 mg by mouth daily    H/o kidney transplant  s/p LRKT in 2002 - no post op complications, no rejections  on tacrolimus 4 mg in am and 3 mg in pm and cellcept 500 mg bid  tac levels 4-6 range    HTN  target BP < 140/90  on metoprolol, losartan and chlorthalidone (1/2 tablet)  + low salt diet  well controlled    Anemia  iron def but cannot take PO iron  needs EGD/ colonoscopy  ( will schedule asap)   vitamin b12 and FA acceptable  Low iron stores (sats 12 and ferritin 7) - will order IV iron     CHF  Grade 1 DD- follows with DR Alem Colunga at [facility].  Compensated   On BB and diuretic    Hyperlipidemia  elevated FLP, have made dietary modification  not on any statin  LDL worse again--> may need to restart statin    Gout   On allopurinol    Depression  Follows up with Dr Marty Morton  needs therapy for 6 weeks- will start next week    Health maintain  she needs a pcp   recommend getting mammogram, egd, colonoscopy, PAP. -has been losing weight as well  refer to onc for screening as well    Had ordered cmv, ebc and bk      PLAN  Start mag oxide 200 mg daily  IV iron 5 doses of 200 mg   Labs and fu in 3 months       Orders This Visit:  Orders Placed This Encounter      Renal Function Panel      CBC With Differential With Platelet      Urinalysis with Culture Reflex      Microalb/Creat Ratio, Random Urine      Magnesium      Tacrolimus (Immunoassay)      Lipid Panel      Iron And Tibc      Ferritin      PTH, Intact      Meds This Visit:  Requested Prescriptions      No prescriptions requested or ordered in this encounter       Imaging & Referrals:  None       Nel Villa MD  7/18/2023

## 2023-07-18 NOTE — TELEPHONE ENCOUNTER
Order for [place] faxed to #[phone] infusion center and advised pt once approved by patient insurance Infusion center will call patient to schedule,pt verbalized understanding.

## 2023-07-20 ENCOUNTER — TELEPHONE (OUTPATIENT)
Dept: NEPHROLOGY | Facility: CLINIC | Age: 68
End: 2023-07-20

## 2023-07-20 LAB
% SATURATION: 12 % (CALC) (ref 16–45)
ABSOLUTE BASOPHILS: 59 CELLS/UL (ref 0–200)
ABSOLUTE EOSINOPHILS: 348 CELLS/UL (ref 15–500)
ABSOLUTE LYMPHOCYTES: 2590 CELLS/UL (ref 850–3900)
ABSOLUTE MONOCYTES: 459 CELLS/UL (ref 200–950)
ABSOLUTE NEUTROPHILS: 3944 CELLS/UL (ref 1500–7800)
ALBUMIN/GLOBULIN RATIO: 1.3 (CALC) (ref 1–2.5)
ALBUMIN: 4.1 G/DL (ref 3.6–5.1)
ALKALINE PHOSPHATASE: 103 U/L (ref 37–153)
ALT: 11 U/L (ref 6–29)
APPEARANCE: CLEAR
AST: 16 U/L (ref 10–35)
BASOPHILS: 0.8 %
BILIRUBIN, TOTAL: 0.5 MG/DL (ref 0.2–1.2)
BILIRUBIN: NEGATIVE
BK VIRUS DNA, QN PCR: NOT DETECTED COPIES/ML
BUN/CREATININE RATIO: 25 (CALC) (ref 6–22)
BUN: 33 MG/DL (ref 7–25)
CALCIUM: 9.8 MG/DL (ref 8.6–10.4)
CARBON DIOXIDE: 26 MMOL/L (ref 20–32)
CHLORIDE: 101 MMOL/L (ref 98–110)
CHOL/HDLC RATIO: 4.8 (CALC)
CHOLESTEROL, TOTAL: 209 MG/DL
COLOR: YELLOW
CREATININE, RANDOM URINE: 85 MG/DL (ref 20–275)
CREATININE: 1.31 MG/DL (ref 0.5–1.05)
EGFR: 45 ML/MIN/1.73M2
EOSINOPHILS: 4.7 %
FERRITIN: 7 NG/ML (ref 16–288)
FOLATE, SERUM: 13.7 NG/ML
GLOBULIN: 3.2 G/DL (CALC) (ref 1.9–3.7)
GLUCOSE: 91 MG/DL (ref 65–99)
GLUCOSE: NEGATIVE
HDL CHOLESTEROL: 44 MG/DL
HEMATOCRIT: 35.3 % (ref 35–45)
HEMOGLOBIN: 11.1 G/DL (ref 11.7–15.5)
IRON BINDING CAPACITY: 436 MCG/DL (CALC) (ref 250–450)
IRON, TOTAL: 54 MCG/DL (ref 45–160)
KETONES: NEGATIVE
LDL-CHOLESTEROL: 133 MG/DL (CALC)
LYMPHOCYTES: 35 %
Lab: NOT DETECTED LOG CPS/ML
MAGNESIUM: 1.4 MG/DL (ref 1.5–2.5)
MCH: 25.2 PG (ref 27–33)
MCHC: 31.4 G/DL (ref 32–36)
MCV: 80.2 FL (ref 80–100)
MICROALBUMIN/CREATININE RATIO, RANDOM URINE: 12 MCG/MG CREAT
MICROALBUMIN: 1 MG/DL
MONOCYTES: 6.2 %
MPV: 11.1 FL (ref 7.5–12.5)
NEUTROPHILS: 53.3 %
NITRITE: NEGATIVE
NON-HDL CHOLESTEROL: 165 MG/DL (CALC)
OCCULT BLOOD: NEGATIVE
PARATHYROID HORMONE,$INTACT: 78 PG/ML (ref 16–77)
PH: 6 (ref 5–8)
PHOSPHATE (AS PHOSPHORUS): 4.4 MG/DL (ref 2.1–4.3)
PLATELET COUNT: 256 THOUSAND/UL (ref 140–400)
POTASSIUM: 3.9 MMOL/L (ref 3.5–5.3)
PROTEIN, TOTAL, RANDOM UR: 6 MG/DL (ref 5–24)
PROTEIN, TOTAL: 7.3 G/DL (ref 6.1–8.1)
PROTEIN: NEGATIVE
RDW: 13 % (ref 11–15)
RED BLOOD CELL COUNT: 4.4 MILLION/UL (ref 3.8–5.1)
SODIUM: 136 MMOL/L (ref 135–146)
SPECIFIC GRAVITY: 1.01 (ref 1–1.03)
T4, FREE: 1.1 NG/DL (ref 0.8–1.8)
TACROLIMUS, HIGHLY SENSITIVE, /MS/MS: 5.3 MCG/L
TRIGLYCERIDES: 182 MG/DL
TSH W/REFLEX TO FT4: 5.01 MIU/L (ref 0.4–4.5)
URIC ACID: 5.6 MG/DL (ref 2.5–7)
VITAMIN B12: 432 PG/ML (ref 200–1100)
WHITE BLOOD CELL COUNT: 7.4 THOUSAND/UL (ref 3.8–10.8)

## 2023-07-20 NOTE — TELEPHONE ENCOUNTER
Dr. Deandre Parker, does pt need these done prior to venefer infusions? I see in your lov note, you recommended to have done but did not specify it needed to be before infusions? Just clarifying.

## 2023-07-20 NOTE — TELEPHONE ENCOUNTER
Patient is calling states that she was told by dr. Josefa Montaño to see gi to schedule egd and cln before infusions. Patient is requesting that office all to help expedite an appointment for her.      Dr. Michael Card [phone]

## 2023-07-21 ENCOUNTER — TELEPHONE (OUTPATIENT)
Dept: HEMATOLOGY/ONCOLOGY | Facility: HOSPITAL | Age: 68
End: 2023-07-21

## 2023-07-21 NOTE — TELEPHONE ENCOUNTER
Rn spoke to patient per Dr Jalen Ring patient need to do both  it does not matter which one that can schedule first,also recommended Dr Avelina Herrera gastroenterologist provided #[phone] if can get earlier appt,pt verbalized understanding. (FYI if patient need a new PCP please provide the doctors name that our office usually recommends :   - Dr. Samantha Santizo  - Dr. Yaya Centeno  - Dr. Sukh Jeong  - Dr. Darion Dmoinguez  - Dr. Licha Handley   To see one of the providers, please call their office at [phone]. They provide services at the following locations:  [address]  [address] [facility],[address], 2nd floor, [address] [place] [id number] [address] [address]

## 2023-07-25 ENCOUNTER — MED REC SCAN ONLY (OUTPATIENT)
Facility: CLINIC | Age: 68
End: 2023-07-25

## 2023-07-26 ENCOUNTER — TELEPHONE (OUTPATIENT)
Facility: CLINIC | Age: 68
End: 2023-07-26

## 2023-07-26 NOTE — TELEPHONE ENCOUNTER
Patient states she tried scheduling appointment to see Dr Phan José at Gastroenterology but first avail is in November. Please call. Thank you.

## 2023-07-26 NOTE — TELEPHONE ENCOUNTER
Dr Ruiz Pichardo please see note below,advised to keep the appt with Dr Alex Borges aware that you are out of the office.

## 2023-07-27 NOTE — TELEPHONE ENCOUNTER
Called patient, name/ verified. Offered sooner available appointment. Patient confirmed and accepted appointment. Date, time, office location and contact number verified. Instructed patient to arrive 15 minutes before appt time and bring insurance card. Patient verbalized understanding. Your Appointments        2023  2:30 PM  Consult with Jean Pierre Ríos PA-C  [address], [address] [facility],[address], [facility]) [address] [place] [address],[address]  [phone]     Appt w/ Dr. Lazaro Ventura on 11/15/23 cancelled.     FYI: Dr. Minor Marie

## 2023-07-27 NOTE — TELEPHONE ENCOUNTER
Please see note below if can assist patient  to get an earlier appointment due to reflux and low blood count thanks.

## 2023-07-28 ENCOUNTER — TELEPHONE (OUTPATIENT)
Dept: NEPHROLOGY | Facility: CLINIC | Age: 68
End: 2023-07-28

## 2023-07-28 NOTE — TELEPHONE ENCOUNTER
Kieran Thacker [facility] calling to request CPT and/or procedure codes, please call direct number [phone],[id number].

## 2023-07-28 NOTE — TELEPHONE ENCOUNTER
Calling about iron infusions that  recommended - needs procedure code and what facility Dr wants her to go to - [address] [place] [address] ?     Please call pt with CPT code and location info for facility

## 2023-07-31 ENCOUNTER — OFFICE VISIT (OUTPATIENT)
Dept: HEMATOLOGY/ONCOLOGY | Facility: HOSPITAL | Age: 68
End: 2023-07-31
Attending: INTERNAL MEDICINE
Payer: COMMERCIAL

## 2023-07-31 VITALS
SYSTOLIC BLOOD PRESSURE: 130 MMHG | RESPIRATION RATE: 18 BRPM | OXYGEN SATURATION: 100 % | HEART RATE: 60 BPM | TEMPERATURE: 98 F | DIASTOLIC BLOOD PRESSURE: 56 MMHG

## 2023-07-31 DIAGNOSIS — D50.9 IRON DEFICIENCY ANEMIA, UNSPECIFIED: Primary | ICD-10-CM

## 2023-07-31 PROCEDURE — 96374 THER/PROPH/DIAG INJ IV PUSH: CPT

## 2023-07-31 NOTE — PROGRESS NOTES
Patient arrives ambulatory to infusion for 200mg Venofer dose 1 of 5, denies new issues or complaints to this RN. Reviewed plan for day and discussed iron infusion education such as length of appt, observation period, s/s of adverse reaction and potential side effects. Venofer given IVP with free flowing NS, 30 mins obs period completed. No s/s of adverse rxn, patient appeared to tolerate well. VS WNL. PIV flushed with NS, removed. Gauze and coban applied to site. Patient discharged stable aware of upcoming appts.

## 2023-08-07 ENCOUNTER — OFFICE VISIT (OUTPATIENT)
Dept: HEMATOLOGY/ONCOLOGY | Facility: HOSPITAL | Age: 68
End: 2023-08-07
Attending: INTERNAL MEDICINE
Payer: COMMERCIAL

## 2023-08-07 VITALS — DIASTOLIC BLOOD PRESSURE: 65 MMHG | SYSTOLIC BLOOD PRESSURE: 133 MMHG | OXYGEN SATURATION: 100 % | HEART RATE: 58 BPM

## 2023-08-07 DIAGNOSIS — D50.9 IRON DEFICIENCY ANEMIA, UNSPECIFIED: Primary | ICD-10-CM

## 2023-08-07 PROCEDURE — 96374 THER/PROPH/DIAG INJ IV PUSH: CPT

## 2023-08-07 NOTE — PROGRESS NOTES
Patient arrives ambulatory to infusion for 200mg Venofer dose 2 of 5, denies new complaints to this RN. Reviewed plan for day and reinforced iron infusion education as needed. Venofer given IVP with free flowing NS, 30 mins obs period completed. No s/s of adverse rxn, patient appeared to tolerate well. VSS. PIV flushed with NS and removed, gauze and coban applied to site. Patient discharged stable aware of next appts via MyChart.

## 2023-08-14 ENCOUNTER — OFFICE VISIT (OUTPATIENT)
Dept: HEMATOLOGY/ONCOLOGY | Facility: HOSPITAL | Age: 68
End: 2023-08-14
Attending: INTERNAL MEDICINE
Payer: COMMERCIAL

## 2023-08-14 VITALS
HEART RATE: 57 BPM | RESPIRATION RATE: 16 BRPM | SYSTOLIC BLOOD PRESSURE: 130 MMHG | OXYGEN SATURATION: 97 % | TEMPERATURE: 98 F | DIASTOLIC BLOOD PRESSURE: 63 MMHG

## 2023-08-14 DIAGNOSIS — D50.9 IRON DEFICIENCY ANEMIA, UNSPECIFIED: Primary | ICD-10-CM

## 2023-08-14 PROCEDURE — 96374 THER/PROPH/DIAG INJ IV PUSH: CPT

## 2023-08-14 NOTE — PROGRESS NOTES
Pt here for venofer 200mg . Pt denies any issues or concerns. Ordering MD: Martha Rose  Order Exp: dose #3 out of a total of 5 doses ordered for     Pt tolerated infusion without difficulty or complaint. Reviewed next apt date/time:  scheduled for next Monday-changed to Thursday per patient request      Education Record    Learner:  Patient    Disease / Diagnosis:    Barriers / Limitations:  None   Comments:    Method:  Discussion   Comments:    General Topics:  Side effects and symptom management and Plan of care reviewed   Comments:    Outcome:  Shows understanding   Comments:   Post 30 minute observation vs wnl. Discharged stable.

## 2023-08-21 ENCOUNTER — APPOINTMENT (OUTPATIENT)
Dept: HEMATOLOGY/ONCOLOGY | Facility: HOSPITAL | Age: 68
End: 2023-08-21
Attending: INTERNAL MEDICINE
Payer: COMMERCIAL

## 2023-08-24 ENCOUNTER — OFFICE VISIT (OUTPATIENT)
Dept: HEMATOLOGY/ONCOLOGY | Facility: HOSPITAL | Age: 68
End: 2023-08-24
Attending: INTERNAL MEDICINE
Payer: COMMERCIAL

## 2023-08-24 VITALS
DIASTOLIC BLOOD PRESSURE: 65 MMHG | TEMPERATURE: 98 F | SYSTOLIC BLOOD PRESSURE: 131 MMHG | RESPIRATION RATE: 16 BRPM | HEART RATE: 51 BPM | OXYGEN SATURATION: 96 %

## 2023-08-24 DIAGNOSIS — D50.9 IRON DEFICIENCY ANEMIA, UNSPECIFIED: Primary | ICD-10-CM

## 2023-08-24 PROCEDURE — 96374 THER/PROPH/DIAG INJ IV PUSH: CPT

## 2023-08-24 NOTE — PROGRESS NOTES
Patient arrives ambulatory to infusion for 200mg Venofer dose 4 of 5. Reports fatigue today. Reviewed plan of care and reinforced iron education. Venofer given IVP over 2 mins with free flowing NS, + blood return noted throughout. 30 mins observation period completed, no s/s of adverse reaction noted, VSS. PIV flushed with NS and removed, gauze and coban applied to site. Patient discharged stable aware of next infusion appt date/time.

## 2023-08-28 ENCOUNTER — APPOINTMENT (OUTPATIENT)
Dept: HEMATOLOGY/ONCOLOGY | Facility: HOSPITAL | Age: 68
End: 2023-08-28
Attending: INTERNAL MEDICINE
Payer: COMMERCIAL

## 2023-08-28 VITALS
HEART RATE: 53 BPM | DIASTOLIC BLOOD PRESSURE: 53 MMHG | OXYGEN SATURATION: 98 % | TEMPERATURE: 98 F | SYSTOLIC BLOOD PRESSURE: 142 MMHG | RESPIRATION RATE: 16 BRPM

## 2023-08-28 DIAGNOSIS — D50.9 IRON DEFICIENCY ANEMIA, UNSPECIFIED: Primary | ICD-10-CM

## 2023-08-28 PROCEDURE — 96374 THER/PROPH/DIAG INJ IV PUSH: CPT

## 2023-08-28 NOTE — PROGRESS NOTES
Patient arrives for venofer 5 of 5. Reports she is okay, complains of some fatigue. Educated patient that it may take all 5 doses and a few days to feel the effect. Verbalized understanding. Does have a follow up with her MD in October to recheck labs. Venofer given slowly over 4 minutes with free flowing NS. Observed 30 minutes, tolerated well. Discharged ambulating independently.

## 2023-09-19 ENCOUNTER — TELEPHONE (OUTPATIENT)
Facility: CLINIC | Age: 68
End: 2023-09-19

## 2023-09-19 ENCOUNTER — OFFICE VISIT (OUTPATIENT)
Facility: CLINIC | Age: 68
End: 2023-09-19

## 2023-09-19 VITALS
HEART RATE: 62 BPM | DIASTOLIC BLOOD PRESSURE: 75 MMHG | BODY MASS INDEX: 27.99 KG/M2 | HEIGHT: 65 IN | SYSTOLIC BLOOD PRESSURE: 124 MMHG | WEIGHT: 168 LBS

## 2023-09-19 DIAGNOSIS — R19.7 DIARRHEA, UNSPECIFIED TYPE: Primary | ICD-10-CM

## 2023-09-19 DIAGNOSIS — D50.9 IRON DEFICIENCY ANEMIA, UNSPECIFIED IRON DEFICIENCY ANEMIA TYPE: ICD-10-CM

## 2023-09-19 DIAGNOSIS — R19.7 DIARRHEA, UNSPECIFIED TYPE: ICD-10-CM

## 2023-09-19 DIAGNOSIS — K21.9 GASTROESOPHAGEAL REFLUX DISEASE, UNSPECIFIED WHETHER ESOPHAGITIS PRESENT: ICD-10-CM

## 2023-09-19 DIAGNOSIS — K21.9 GASTROESOPHAGEAL REFLUX DISEASE WITHOUT ESOPHAGITIS: ICD-10-CM

## 2023-09-19 DIAGNOSIS — D64.9 ANEMIA, UNSPECIFIED TYPE: Primary | ICD-10-CM

## 2023-09-19 DIAGNOSIS — R10.32 LLQ PAIN: ICD-10-CM

## 2023-09-19 PROCEDURE — 3074F SYST BP LT 130 MM HG: CPT | Performed by: PHYSICIAN ASSISTANT

## 2023-09-19 PROCEDURE — 99244 OFF/OP CNSLTJ NEW/EST MOD 40: CPT | Performed by: PHYSICIAN ASSISTANT

## 2023-09-19 PROCEDURE — 3008F BODY MASS INDEX DOCD: CPT | Performed by: PHYSICIAN ASSISTANT

## 2023-09-19 PROCEDURE — 3078F DIAST BP <80 MM HG: CPT | Performed by: PHYSICIAN ASSISTANT

## 2023-09-19 RX ORDER — OMEPRAZOLE 40 MG/1
40 CAPSULE, DELAYED RELEASE ORAL DAILY
Qty: 30 CAPSULE | Refills: 11 | Status: SHIPPED | OUTPATIENT
Start: 2023-09-19

## 2023-09-19 NOTE — H&P (VIEW-ONLY)
[facility], [facility] - Gastroenterology                                                                                                               Reason for consult: Patient presents with:  Colonoscopy Screening  Abdominal Pain      Requesting physician or provider: Varsha Nelson MD      HPI:   Roxy Naidu is a 80year old year-old female with history of h/o reflux nephropathy s/p ureteral implantation with resultant renal failure requiring preemptive LRKT 2002( from her sister,  six antigen match at [facility] ),  now with CKD, HLD, Anemia, depression , gout , G1 DD, s/p covid for 5 mth in 2022. s/p monoclonal AB infusion who presents for evaluation of ANAHI. Patient has completed 5 days of IV venofer. GERD- She is on protonix. She will get epigastric pain that is very uncomfortable. She often is nausea daily. Can have episodes of vomiting, but they do not occur daily. Denies hematemesis. Denies dysphagia, globus, odynophagia. Diarrhea- She has had stool testing done that is negative. She will have diarrhea about 10 mins after eating anything. She can have about 5 bowel movements per day. Nausea with food. No brbpr or melena. Feeling as though her stomach does bloat after eating. She does complain of LLQ abdominal pain. Often is sharp and shooting and is associated with diarrhea. Denies fevers, chills and sick contacts. Patient is immunocompromised.      NSAIDS: none  Tobacco: none  Alcohol: none  Marijuana: none  Illicit drugs: none    FH GI malignancy- none  FH celiac dz- none  FH liver dz- none  FH IBD- none    No history of adverse reaction to sedation  No CINDY  No anticoagulants  No pacemaker/defibrillator  No pain medications and/or sleep aides      Last colonoscopy: 10 years ago[facility]  Last EGD: 10 years, [facility]    Wt Readings from Last 6 Encounters:  09/19/23 : 168 lb (76.2 kg)  07/18/23 : 169 lb (76.7 kg)       History, Medications, Allergies, ROS:      Past Medical History:   Diagnosis Date    Essential hypertension       Past Surgical History:   Procedure Laterality Date    HYSTERECTOMY      56      Family Hx:   Family History   Problem Relation Age of Onset    Diabetes Mother     Hypertension Mother     Other (Other) Brother       Social History:   Social History     Socioeconomic History    Marital status:    Tobacco Use    Smoking status: Never    Smokeless tobacco: Never   Substance and Sexual Activity    Alcohol use: Never    Drug use: Never        Medications (Active prior to today's visit):  Current Outpatient Medications   Medication Sig Dispense Refill    Omeprazole 40 MG Oral Capsule Delayed Release Take 1 capsule (40 mg total) by mouth daily. Take 1 capsule by mouth daily before breakfast. 30 capsule 11    polyethylene glycol, PEG 3350-KCl-NaBcb-NaCl-NaSulf, 236 g Oral Recon Soln Take 4,000 mL by mouth As Directed. Take 2,000 mL the night before your procedure and 2,000 mL the morning of your procedure. 1 each 0    allopurinol 100 MG Oral Tab Take 1 tablet (100 mg total) by mouth 2 (two) times daily. aspirin 325 MG Oral Tab Take 1 tablet (325 mg total) by mouth daily. losartan 100 MG Oral Tab Take 1 tablet (100 mg total) by mouth daily. chlorthalidone 25 MG Oral Tab Take 0.5 tablets (12.5 mg total) by mouth daily. metoprolol succinate ER 25 MG Oral Tablet 24 Hr Take 1 tablet (25 mg total) by mouth daily. mycophenolate mofetil 250 MG Oral Cap Take 6 capsules (1,500 mg total) by mouth 2 (two) times daily before meals. tacrolimus 1 MG Oral Cap Take 1 capsule (1 mg total) by mouth 2 (two) times daily. Take 4 tables morning  Take 3 tables evening      sertraline 50 MG Oral Tab Take 1.5 tablets (75 mg total) by mouth daily. traZODone 50 MG Oral Tab Take 1 tablet (50 mg total) by mouth nightly. acetaminophen 325 MG Oral Tab Take 1 tablet (325 mg total) by mouth every 6 (six) hours as needed for Pain. Allergies:    Dairy Products          HIVES, DIARRHEA  Codeine                 NAUSEA AND VOMITING    ROS:   CONSTITUTIONAL: negative for fevers, chills, sweats and weight loss  EYES Negative for red eyes, yellow eyes, changes in vision  HEENT: Negative for dysphagia and hoarseness  RESPIRATORY: Negative for cough and shortness of breath  CARDIOVASCULAR: Negative for chest pain, palpitations  GASTROINTESTINAL: See HPI  GENITOURINARY: Negative for dysuria and frequency  MUSCULOSKELETAL: Negative for arthralgias and myalgias  NEUROLOGICAL: Negative for dizziness and headaches  BEHAVIOR/PSYCH: Negative for anxiety and poor appetite    PHYSICAL EXAM:   Blood pressure 124/75, pulse 62, height 5' 5\" (1.651 m), weight 168 lb (76.2 kg). GEN: WD/WN, NAD  HEENT: Supple symmetrical, trachea midline  CV: RRR, the extremities are warm and well perfused   LUNGS: No increased work of breathing  ABDOMEN: LLQ tenderness on exam, no rebound or guarding. No scars, normal bowel sounds, soft, non-distended no rebound, no masses, no hepatomegaly  MSK: No redness, no warmth, no swelling of joints  SKIN: No jaundice, no erythema, no rashes  HEMATOLOGIC: No bleeding, no bruising  NEURO: Alert and interactive, normal gait    Labs/Imaging/Procedures:     Patient's pertinent labs and imaging were reviewed and discussed with patient today.      Lab Results   Component Value Date    WBC 7.4 07/14/2023    RBC 4.40 07/14/2023    HGB 11.1 (L) 07/14/2023    HCT 35.3 07/14/2023    MCV 80.2 07/14/2023    MCH 25.2 (L) 07/14/2023    MCHC 31.4 (L) 07/14/2023    RDW 13.0 07/14/2023     07/14/2023        Lab Results   Component Value Date    GLU 91 07/14/2023    BUN 33 (H) 07/14/2023    BUNCREA 25 (H) 07/14/2023    CREATSERUM 1.31 (H) 07/14/2023    CA 9.8 07/14/2023    ALKPHO 103 07/14/2023    AST 16 07/14/2023    ALT 11 07/14/2023    BILT 0.5 07/14/2023    TP 7.3 07/14/2023    ALB 4.1 07/14/2023    GLOBULIN 3.2 07/14/2023    AGRATIO 1.3 07/14/2023     07/14/2023    K 3.9 07/14/2023     07/14/2023    CO2 26 07/14/2023        No results found. .  ASSESSMENT/PLAN:   Davey Pal is a 80year old year-old female with history of h/o reflux nephropathy s/p ureteral implantation with resultant renal failure requiring preemptive LRKT 2002( from her sister,  six antigen match at [facility] ),  now with CKD, HLD, Anemia, depression , gout , G1 DD, s/p covid for 5 mth in 2022. s/p monoclonal AB infusion who presents for evaluation of ANAHI. #GERD  Symptoms on pantoprazole. Can have associated nausea and vomiting. No dysphagia, globus or odynophagia. Discussed need for EGD to evaluate for esophagitis or gastritis. Will trial switching to omeprazole 40 mg at this time. #Diarrhea  #LLQ pain  5 episodes per day. Often can be associated with meals. Does try to monitor what she eats to prevent diarrhea. She does have LLQ abdominal pain with and without the diarrhea. Often varies in intensity. Denies brbpr or melena. LLQ tenderness on exam. No rebound or guarding. Advised for stat CT to look for diverticulitis. Plan for cln as well. #ANAHI  Plan EGD/cln to evaluate for GI source. No overt signs of GIB at this time. Recommendations:  - stop pantoprazole, start omeprazole   - call to get CT a/p scheduled as soon as possible    1. Schedule colonoscopy/ EGD with Dr. Kishore Rivera with [facility] [Diagnosis: anemia, GERD, diarrhea]    2.  bowel prep from pharmacy (split golytely)    3. Continue all medications as normal for your procedure. 4. Read all bowel prep instructions carefully. Bowel prep instructions can also be found online at:  www.eehealth.org/giprep     5. AVOID seeds, nuts, popcorn, raw fruits and vegetables for 3 days before procedure    6. You MAY need to go for COVID testing 72 hours before procedure. The testing team will call you a few days before your procedure to discuss with you if testing is required.  If you are asked to go for COVID testing and do not completed the test, the procedure cannot be performed. 7. If you start any NEW medication after your visit today, please notify us. Certain medications (like iron or weight loss medications) will need to be held before the procedure, or the procedure cannot be performed safely. Orders This Visit:  No orders of the defined types were placed in this encounter. Meds This Visit:  Requested Prescriptions     Signed Prescriptions Disp Refills    Omeprazole 40 MG Oral Capsule Delayed Release 30 capsule 11     Sig: Take 1 capsule (40 mg total) by mouth daily. Take 1 capsule by mouth daily before breakfast.    polyethylene glycol, PEG 3350-KCl-NaBcb-NaCl-NaSulf, 236 g Oral Recon Soln 1 each 0     Sig: Take 4,000 mL by mouth As Directed. Take 2,000 mL the night before your procedure and 2,000 mL the morning of your procedure. Imaging & Referrals:  CT ABDOMEN+PELVIS(MHP=[id number])      Jackie Dang PA-C   9/19/2023        This note was partially prepared using [employer] [facility] voice recognition dictation software. As a result, errors may occur. When identified, these errors have been corrected.  While every attempt is made to correct errors during dictation, discrepancies may still exist.

## 2023-09-19 NOTE — H&P
[facility], [facility] - Gastroenterology                                                                                                               Reason for consult: Patient presents with:  Colonoscopy Screening  Abdominal Pain      Requesting physician or provider: Archie Nelson MD      HPI:   Ursula Donnelly is a 80year old year-old female with history of h/o reflux nephropathy s/p ureteral implantation with resultant renal failure requiring preemptive LRKT 2002( from her sister,  six antigen match at [facility] ),  now with CKD, HLD, Anemia, depression , gout , G1 DD, s/p covid for 5 mth in 2022. s/p monoclonal AB infusion who presents for evaluation of ANAHI. Patient has completed 5 days of IV venofer. GERD- She is on protonix. She will get epigastric pain that is very uncomfortable. She often is nausea daily. Can have episodes of vomiting, but they do not occur daily. Denies hematemesis. Denies dysphagia, globus, odynophagia. Diarrhea- She has had stool testing done that is negative. She will have diarrhea about 10 mins after eating anything. She can have about 5 bowel movements per day. Nausea with food. No brbpr or melena. Feeling as though her stomach does bloat after eating. She does complain of LLQ abdominal pain. Often is sharp and shooting and is associated with diarrhea. Denies fevers, chills and sick contacts. Patient is immunocompromised.      NSAIDS: none  Tobacco: none  Alcohol: none  Marijuana: none  Illicit drugs: none    FH GI malignancy- none  FH celiac dz- none  FH liver dz- none  FH IBD- none    No history of adverse reaction to sedation  No CINDY  No anticoagulants  No pacemaker/defibrillator  No pain medications and/or sleep aides      Last [facility]oscopy: 10 years ago[facility]  [facility]GD: 10 years, [facility]    Wt Readings from Last 6 Encounters:  09/19/23 : 168 lb (76.2 kg)  07/18/23 : 169 lb (76.7 kg)       History, Medications, Allergies, ROS:      Past Medical History:   Diagnosis Date    Essential hypertension       Past Surgical History:   Procedure Laterality Date    HYSTERECTOMY      56      Family Hx:   Family History   Problem Relation Age of Onset    Diabetes Mother     Hypertension Mother     Other (Other) Brother       Social History:   Social History     Socioeconomic History    Marital status:    Tobacco Use    Smoking status: Never    Smokeless tobacco: Never   Substance and Sexual Activity    Alcohol use: Never    Drug use: Never        Medications (Active prior to today's visit):  Current Outpatient Medications   Medication Sig Dispense Refill    Omeprazole 40 MG Oral Capsule Delayed Release Take 1 capsule (40 mg total) by mouth daily. Take 1 capsule by mouth daily before breakfast. 30 capsule 11    polyethylene glycol, PEG 3350-KCl-NaBcb-NaCl-NaSulf, 236 g Oral Recon Soln Take 4,000 mL by mouth As Directed. Take 2,000 mL the night before your procedure and 2,000 mL the morning of your procedure. 1 each 0    allopurinol 100 MG Oral Tab Take 1 tablet (100 mg total) by mouth 2 (two) times daily. aspirin 325 MG Oral Tab Take 1 tablet (325 mg total) by mouth daily. losartan 100 MG Oral Tab Take 1 tablet (100 mg total) by mouth daily. chlorthalidone 25 MG Oral Tab Take 0.5 tablets (12.5 mg total) by mouth daily. metoprolol succinate ER 25 MG Oral Tablet 24 Hr Take 1 tablet (25 mg total) by mouth daily. mycophenolate mofetil 250 MG Oral Cap Take 6 capsules (1,500 mg total) by mouth 2 (two) times daily before meals. tacrolimus 1 MG Oral Cap Take 1 capsule (1 mg total) by mouth 2 (two) times daily. Take 4 tables morning  Take 3 tables evening      sertraline 50 MG Oral Tab Take 1.5 tablets (75 mg total) by mouth daily. traZODone 50 MG Oral Tab Take 1 tablet (50 mg total) by mouth nightly. acetaminophen 325 MG Oral Tab Take 1 tablet (325 mg total) by mouth every 6 (six) hours as needed for Pain. Allergies:    Dairy Products          HIVES, DIARRHEA  Codeine                 NAUSEA AND VOMITING    ROS:   CONSTITUTIONAL: negative for fevers, chills, sweats and weight loss  EYES Negative for red eyes, yellow eyes, changes in vision  HEENT: Negative for dysphagia and hoarseness  RESPIRATORY: Negative for cough and shortness of breath  CARDIOVASCULAR: Negative for chest pain, palpitations  GASTROINTESTINAL: See HPI  GENITOURINARY: Negative for dysuria and frequency  MUSCULOSKELETAL: Negative for arthralgias and myalgias  NEUROLOGICAL: Negative for dizziness and headaches  BEHAVIOR/PSYCH: Negative for anxiety and poor appetite    PHYSICAL EXAM:   Blood pressure 124/75, pulse 62, height 5' 5\" (1.651 m), weight 168 lb (76.2 kg). GEN: WD/WN, NAD  HEENT: Supple symmetrical, trachea midline  CV: RRR, the extremities are warm and well perfused   LUNGS: No increased work of breathing  ABDOMEN: LLQ tenderness on exam, no rebound or guarding. No scars, normal bowel sounds, soft, non-distended no rebound, no masses, no hepatomegaly  MSK: No redness, no warmth, no swelling of joints  SKIN: No jaundice, no erythema, no rashes  HEMATOLOGIC: No bleeding, no bruising  NEURO: Alert and interactive, normal gait    Labs/Imaging/Procedures:     Patient's pertinent labs and imaging were reviewed and discussed with patient today.      Lab Results   Component Value Date    WBC 7.4 07/14/2023    RBC 4.40 07/14/2023    HGB 11.1 (L) 07/14/2023    HCT 35.3 07/14/2023    MCV 80.2 07/14/2023    MCH 25.2 (L) 07/14/2023    MCHC 31.4 (L) 07/14/2023    RDW 13.0 07/14/2023     07/14/2023        Lab Results   Component Value Date    GLU 91 07/14/2023    BUN 33 (H) 07/14/2023    BUNCREA 25 (H) 07/14/2023    CREATSERUM 1.31 (H) 07/14/2023    CA 9.8 07/14/2023    ALKPHO 103 07/14/2023    AST 16 07/14/2023    ALT 11 07/14/2023    BILT 0.5 07/14/2023    TP 7.3 07/14/2023    ALB 4.1 07/14/2023    GLOBULIN 3.2 07/14/2023    AGRATIO 1.3 07/14/2023     07/14/2023    K 3.9 07/14/2023     07/14/2023    CO2 26 07/14/2023        No results found. .  ASSESSMENT/PLAN:   Antonette Cifuentes is a 80year old year-old female with history of h/o reflux nephropathy s/p ureteral implantation with resultant renal failure requiring preemptive LRKT 2002( from her sister,  six antigen match at [facility] ),  now with CKD, HLD, Anemia, depression , gout , G1 DD, s/p covid for 5 mth in 2022. s/p monoclonal AB infusion who presents for evaluation of ANAHI. #GERD  Symptoms on pantoprazole. Can have associated nausea and vomiting. No dysphagia, globus or odynophagia. Discussed need for EGD to evaluate for esophagitis or gastritis. Will trial switching to omeprazole 40 mg at this time. #Diarrhea  #LLQ pain  5 episodes per day. Often can be associated with meals. Does try to monitor what she eats to prevent diarrhea. She does have LLQ abdominal pain with and without the diarrhea. Often varies in intensity. Denies brbpr or melena. LLQ tenderness on exam. No rebound or guarding. Advised for stat CT to look for diverticulitis. Plan for cln as well. #ANAHI  Plan EGD/cln to evaluate for GI source. No overt signs of GIB at this time. Recommendations:  - stop pantoprazole, start omeprazole   - call to get CT a/p scheduled as soon as possible    1. Schedule colonoscopy/ EGD with Dr. Phan José with [facility] [Diagnosis: anemia, GERD, diarrhea]    2.  bowel prep from pharmacy (split golytely)    3. Continue all medications as normal for your procedure. 4. Read all bowel prep instructions carefully. Bowel prep instructions can also be found online at:  www.[facility]health.org/giprep     5. AVOID seeds, nuts, popcorn, raw fruits and vegetables for 3 days before procedure    6. You MAY need to go for COVID testing 72 hours before procedure. The testing team will call you a few days before your procedure to discuss with you if testing is required.  If you are asked to go for COVID testing and do not completed the test, the procedure cannot be performed. 7. If you start any NEW medication after your visit today, please notify us. Certain medications (like iron or weight loss medications) will need to be held before the procedure, or the procedure cannot be performed safely. Orders This Visit:  No orders of the defined types were placed in this encounter. Meds This Visit:  Requested Prescriptions     Signed Prescriptions Disp Refills    Omeprazole 40 MG Oral Capsule Delayed Release 30 capsule 11     Sig: Take 1 capsule (40 mg total) by mouth daily. Take 1 capsule by mouth daily before breakfast.    polyethylene glycol, PEG 3350-KCl-NaBcb-NaCl-NaSulf, 236 g Oral Recon Soln 1 each 0     Sig: Take 4,000 mL by mouth As Directed. Take 2,000 mL the night before your procedure and 2,000 mL the morning of your procedure. Imaging & Referrals:  CT ABDOMEN+PELVIS(OAL=[id number])      Rosalva Wiggins PA-C   9/19/2023        This note was partially prepared using [facility] [employer] voice recognition dictation software. As a result, errors may occur. When identified, these errors have been corrected.  While every attempt is made to correct errors during dictation, discrepancies may still exist.

## 2023-09-19 NOTE — PATIENT INSTRUCTIONS
Recommendations:  - stop pantoprazole, start omeprazole   - call to get CT a/p scheduled as soon as possible    1. Schedule colonoscopy/ EGD with Dr. Kaycee Mckenzie with [facility] [Diagnosis: anemia, GERD, diarrhea]    2.  bowel prep from pharmacy (split golytely)    3. Continue all medications as normal for your procedure. 4. Read all bowel prep instructions carefully. Bowel prep instructions can also be found online at:  www.health.org/giprep     5. AVOID seeds, nuts, popcorn, raw fruits and vegetables for 3 days before procedure    6. You MAY need to go for COVID testing 72 hours before procedure. The testing team will call you a few days before your procedure to discuss with you if testing is required. If you are asked to go for COVID testing and do not completed the test, the procedure cannot be performed. 7. If you start any NEW medication after your visit today, please notify us. Certain medications (like iron or weight loss medications) will need to be held before the procedure, or the procedure cannot be performed safely.

## 2023-09-19 NOTE — TELEPHONE ENCOUNTER
Scheduled for:  Colonoscopy [id number] / [phone]    Provider Name:  Dr Fabricio Arroyo   Date:  12/28/2023  Location:  [facility]  Sedation:  [facility]  Time:  9:45 AM ( Patient is aware to arrive at 8:45 AM)   Prep:  Split Golytely     Meds/Allergies Reconciled?:   Provider Reviewed     Diagnosis with codes:      Anemia D64.9  GERD K21.9  Diarrhea R19.7    Was patient informed to call insurance with codes (Y/N):  Yes     Referral sent?: Referral was sent at the time of electronic surgical scheduling. [facility] or [address] notified?: I sent an electronic request to Endo Scheduling and received a confirmation today. Medication Orders:  Patient is aware to NOT take iron pills, herbal meds and diet supplements for 7 days before exam. Also to NOT take any form of alcohol, recreational drugs and any forms of ED meds 24 hours before exam.     Misc Orders:  N/A     Further instructions given by staff:  I Provided prep instructions to patient and reviewed date, time and location. Patient verbalized that She understood and is aware to call with any questions. Patient was informed about the new cancellation policy for Her procedure. Patient was also given copy of the cancellation policy at the time of the appointment and verbalized understanding.

## 2023-09-20 ENCOUNTER — TELEPHONE (OUTPATIENT)
Facility: CLINIC | Age: 68
End: 2023-09-20

## 2023-09-20 ENCOUNTER — TELEPHONE (OUTPATIENT)
Dept: NEPHROLOGY | Facility: CLINIC | Age: 68
End: 2023-09-20

## 2023-09-20 DIAGNOSIS — D64.9 ANEMIA, UNSPECIFIED TYPE: Primary | ICD-10-CM

## 2023-09-20 DIAGNOSIS — R19.7 DIARRHEA, UNSPECIFIED TYPE: ICD-10-CM

## 2023-09-20 DIAGNOSIS — K21.9 GASTROESOPHAGEAL REFLUX DISEASE, UNSPECIFIED WHETHER ESOPHAGITIS PRESENT: ICD-10-CM

## 2023-09-20 NOTE — TELEPHONE ENCOUNTER
Eliud Clayton saw this patient yesterday in office. She is currently scheduled for colonoscopy/egd on 12/28/23 with Dr. Acacia Harris. Call received from nephrology office stating patient is asking for sooner appt if possible. Is she able to schedule with any provider if there is a sooner appt?

## 2023-09-20 NOTE — TELEPHONE ENCOUNTER
Dr Anisa Currie please see note below Rn called Gi Department spoke to Wanda Gonzalez if patient can be seen sooner than 12/28/23  and will forward the request to their .

## 2023-09-21 NOTE — TELEPHONE ENCOUNTER
Patient contacted. Notified that Peter Hernández recommended she proceed with CT as well. She is going to call her insurance and ask about coverage here/out of pocket costs, if they won't cover it here she would want to go to [address] [place] [address]  I gave her the number to call central scheduling to schedule-aware our PA team will work to obtain authorization once scheduled.     Aware it is STAT

## 2023-09-21 NOTE — TELEPHONE ENCOUNTER
Schedulers:  can you please see if there is any sooner availability for procedures? Nephrology is requesting sooner procedure. First available provider ok (see below message)    Thank you    I tried to call patient to make sure she had the number to central scheduling to get scheduled for the CT which is [phone], there was no answer and her voicemail is not set up.

## 2023-09-21 NOTE — TELEPHONE ENCOUNTER
Rescheduled for:  Colonoscopy [phone] / [phone]  Provider Name: FROM  Dr Jacklin Bastos Dr. Jennifer Spurling   Date: FROM  12/28/2023  TO 9/25/23   Location:  [facility]  Sedation:  MAC  Time: FROM  9:45 AM  TO 1:15 ( Patient is aware to arrive at 12:15 PM)   Prep:  Split Golytely   Meds/Allergies Reconciled?: Provider Reviewed   Diagnosis with codes:    Anemia D64.9  GERD K21.9  Diarrhea R19.7  Was patient informed to call insurance with codes (Y/N):  Yes  Referral sent?: Referral was sent at the time of electronic surgical scheduling. [facility] or [address]  notified?: I sent an electronic request to Endo Scheduling and received a confirmation today. Medication Orders:  Patient is aware to NOT take iron pills, herbal meds and diet supplements for 7 days before exam. Also to NOT take any form of alcohol, recreational drugs and any forms of ED meds 24 hours before exam.   Misc Orders:  N/A  Further instructions given by staff:  I Provided prep instructions to patient and reviewed date, time and location. Patient verbalized that She understood and is aware to call with any questions. Patient was informed about the new cancellation policy for Her procedure. Patient was also given copy of the cancellation policy at the time of the appointment and verbalized understanding.

## 2023-09-21 NOTE — TELEPHONE ENCOUNTER
Patient was scheduled with soonest available provider. If there is someone who can get her in sooner that can be arranged. She should also get the CT I ordered for her on 9/19.      Thanks   Ayad Hammond

## 2023-09-21 NOTE — TELEPHONE ENCOUNTER
PPD-    Patient is scheduled for colonoscopy [id number] and EGD [id number] on 9/25/2023 with Dr. Shaka Gutierrez at [address] [place] [address]. Thank you!

## 2023-09-21 NOTE — TELEPHONE ENCOUNTER
I called Raf Christianson at [phone]  I spoke to Teo Hollingsworthuse was told that she has [employer] plan and they have to do an internal transfer to a specific department that has no direct number that they can provide to me.     I then spoke to Bubba with [employer]  She told me that the approval is valid 9/21/23 through 10/20/23  For [facility] location  Order # [id number]

## 2023-09-21 NOTE — TELEPHONE ENCOUNTER
Yossi Smith    I spoke to [address] authorized, I offered to call central scheduling with her to get her in stat but she wants to call herself-declined assistance with this appointment. She wanted to know if needed CT before colonoscopy. I let her know would be best to get CT in next 24 hours since stat and we are checking for diverticulitis. She is scheduled for colonoscopy on 9/25/23. Would she need to reschedule 9/25/23 procedure if she does not get CT by then?     Thank you

## 2023-09-22 ENCOUNTER — HOSPITAL ENCOUNTER (OUTPATIENT)
Dept: CT IMAGING | Age: 68
Discharge: HOME | End: 2023-09-22
Attending: PHYSICIAN ASSISTANT
Payer: COMMERCIAL

## 2023-09-22 DIAGNOSIS — R10.32 LLQ PAIN: ICD-10-CM

## 2023-09-22 PROCEDURE — 74176 CT ABD & PELVIS W/O CONTRAST: CPT | Performed by: PHYSICIAN ASSISTANT

## 2023-09-22 NOTE — TELEPHONE ENCOUNTER
She is scheduled for CT this evening    Your Appointments      Friday September 22, 2023  6:30 PM  CT ABDOMEN PELVIS with [facility] ([address] [facility],[address]) [address] [place] [address]  [phone]   Please arrive 15 minutes prior to your scheduled appointment time.        Monday September 25, 2023  1:15 PM  Procedure [facility], [address] [facility], [address] [facility],[address], [place] (--) [address] [facility],[address]  [phone]

## 2023-09-22 NOTE — TELEPHONE ENCOUNTER
MEAGAN [facility], [phone], after unable to enter on [employer]. Spoke with ERICK. She states precert is not required for these codes and predeterm is not recommended.   Call ref # [id number]

## 2023-09-25 ENCOUNTER — HOSPITAL ENCOUNTER (OUTPATIENT)
Facility: HOSPITAL | Age: 68
Setting detail: HOSPITAL OUTPATIENT SURGERY
Discharge: HOME | End: 2023-09-25
Attending: INTERNAL MEDICINE | Admitting: INTERNAL MEDICINE
Payer: COMMERCIAL

## 2023-09-25 ENCOUNTER — TELEPHONE (OUTPATIENT)
Facility: CLINIC | Age: 68
End: 2023-09-25

## 2023-09-25 ENCOUNTER — ANESTHESIA EVENT (OUTPATIENT)
Dept: ENDOSCOPY | Facility: HOSPITAL | Age: 68
End: 2023-09-25
Payer: COMMERCIAL

## 2023-09-25 ENCOUNTER — ANESTHESIA (OUTPATIENT)
Dept: ENDOSCOPY | Facility: HOSPITAL | Age: 68
End: 2023-09-25
Payer: COMMERCIAL

## 2023-09-25 ENCOUNTER — TELEPHONE (OUTPATIENT)
Dept: GASTROENTEROLOGY | Facility: CLINIC | Age: 68
End: 2023-09-25

## 2023-09-25 VITALS
TEMPERATURE: 98 F | WEIGHT: 165 LBS | BODY MASS INDEX: 27.49 KG/M2 | HEIGHT: 65 IN | RESPIRATION RATE: 11 BRPM | SYSTOLIC BLOOD PRESSURE: 148 MMHG | HEART RATE: 58 BPM | OXYGEN SATURATION: 100 % | DIASTOLIC BLOOD PRESSURE: 75 MMHG

## 2023-09-25 DIAGNOSIS — D64.9 ANEMIA, UNSPECIFIED TYPE: ICD-10-CM

## 2023-09-25 DIAGNOSIS — K21.9 GASTROESOPHAGEAL REFLUX DISEASE, UNSPECIFIED WHETHER ESOPHAGITIS PRESENT: Primary | ICD-10-CM

## 2023-09-25 DIAGNOSIS — K21.9 GASTROESOPHAGEAL REFLUX DISEASE, UNSPECIFIED WHETHER ESOPHAGITIS PRESENT: ICD-10-CM

## 2023-09-25 DIAGNOSIS — R19.7 DIARRHEA, UNSPECIFIED TYPE: ICD-10-CM

## 2023-09-25 PROCEDURE — 43239 EGD BIOPSY SINGLE/MULTIPLE: CPT | Performed by: INTERNAL MEDICINE

## 2023-09-25 PROCEDURE — 0DB68ZX EXCISION OF STOMACH, VIA NATURAL OR ARTIFICIAL OPENING ENDOSCOPIC, DIAGNOSTIC: ICD-10-PCS | Performed by: INTERNAL MEDICINE

## 2023-09-25 PROCEDURE — 0DB98ZX EXCISION OF DUODENUM, VIA NATURAL OR ARTIFICIAL OPENING ENDOSCOPIC, DIAGNOSTIC: ICD-10-PCS | Performed by: INTERNAL MEDICINE

## 2023-09-25 PROCEDURE — 0DB58ZX EXCISION OF ESOPHAGUS, VIA NATURAL OR ARTIFICIAL OPENING ENDOSCOPIC, DIAGNOSTIC: ICD-10-PCS | Performed by: INTERNAL MEDICINE

## 2023-09-25 PROCEDURE — 0DJD8ZZ INSPECTION OF LOWER INTESTINAL TRACT, VIA NATURAL OR ARTIFICIAL OPENING ENDOSCOPIC: ICD-10-PCS | Performed by: INTERNAL MEDICINE

## 2023-09-25 PROCEDURE — 45378 DIAGNOSTIC COLONOSCOPY: CPT | Performed by: INTERNAL MEDICINE

## 2023-09-25 RX ORDER — NALOXONE HYDROCHLORIDE 0.4 MG/ML
80 INJECTION, SOLUTION INTRAMUSCULAR; INTRAVENOUS; SUBCUTANEOUS AS NEEDED
Status: DISCONTINUED | OUTPATIENT
Start: 2023-09-25 | End: 2023-09-25

## 2023-09-25 RX ORDER — PANTOPRAZOLE SODIUM 40 MG/1
40 TABLET, DELAYED RELEASE ORAL DAILY
COMMUNITY
End: 2023-09-27 | Stop reason: ALTCHOICE

## 2023-09-25 RX ORDER — SODIUM CHLORIDE, SODIUM LACTATE, POTASSIUM CHLORIDE, CALCIUM CHLORIDE 600; 310; 30; 20 MG/100ML; MG/100ML; MG/100ML; MG/100ML
INJECTION, SOLUTION INTRAVENOUS CONTINUOUS
Status: DISCONTINUED | OUTPATIENT
Start: 2023-09-25 | End: 2023-09-25

## 2023-09-25 RX ORDER — LIDOCAINE HYDROCHLORIDE 10 MG/ML
INJECTION, SOLUTION EPIDURAL; INFILTRATION; INTRACAUDAL; PERINEURAL AS NEEDED
Status: DISCONTINUED | OUTPATIENT
Start: 2023-09-25 | End: 2023-09-25 | Stop reason: SURG

## 2023-09-25 RX ORDER — BECLOMETHASONE DIPROPIONATE HFA 80 UG/1
1 AEROSOL, METERED RESPIRATORY (INHALATION)
COMMUNITY

## 2023-09-25 RX ADMIN — LIDOCAINE HYDROCHLORIDE 50 MG: 10 INJECTION, SOLUTION EPIDURAL; INFILTRATION; INTRACAUDAL; PERINEURAL at 13:35:00

## 2023-09-25 RX ADMIN — SODIUM CHLORIDE, SODIUM LACTATE, POTASSIUM CHLORIDE, CALCIUM CHLORIDE: 600; 310; 30; 20 INJECTION, SOLUTION INTRAVENOUS at 13:35:00

## 2023-09-25 RX ADMIN — SODIUM CHLORIDE, SODIUM LACTATE, POTASSIUM CHLORIDE, CALCIUM CHLORIDE: 600; 310; 30; 20 INJECTION, SOLUTION INTRAVENOUS at 13:59:00

## 2023-09-25 NOTE — INTERVAL H&P NOTE
Pre-op Diagnosis: Anemia, unspecified type / Gastroesophageal reflux disease, unspecified whether esophagitis present / Diarrhea, unspecified type    The above referenced H&P was reviewed by Loreta Grady MD on 9/25/2023, the patient was examined and no significant changes have occurred in the patient's condition since the H&P was performed. I discussed with the patient and/or legal representative the potential benefits, risks and side effects of this procedure; the likelihood of the patient achieving goals; and potential problems that might occur during recuperation. I discussed reasonable alternatives to the procedure, including risks, benefits and side effects related to the alternatives and risks related to not receiving this procedure. We will proceed with procedure as planned.

## 2023-09-25 NOTE — OPERATIVE REPORT
ESOPHAGOGASTRODUODENOSCOPY (EGD) & COLONOSCOPY REPORT    Mackenzie Nelson     3/9/1955 Age 80year old   PCP Arnoldo Nelson MD Endoscopist Mohinder Thomas MD     Date of procedure: 23    Procedure: EGD w/cold biopsy & Colonoscopy    Pre-operative diagnosis: ANAHI and reflux    Post-operative diagnosis: gastric ulcer 5-6 mm clean based, gastric erosions, colon diverticulosis and hemorrhoids     Medications: MAC sedation    Withdrawal time: 7 minutes    Complications: none    Procedure: Informed consent was obtained from the patient after the risks of the procedure were discussed, including but not limited to bleeding, perforation, aspiration, infection, or possibility of a missed lesion. We discussed the risks/benefits and alternatives to this procedure, as well as the planned sedation. EGD procedure: The patient was placed in the left lateral decubitus position and begun on continuous blood pressure pulse oximetry and EKG monitoring and this was maintained throughout the procedure. Once an adequate level of sedation was obtained a bite block was placed. Then the lubricated tip of the [id number]-[id number] diagnostic video upper endoscope was inserted and advanced using direct visualization into the posterior pharynx and ultimately into the esophagus. Colonoscopy procedure: Once an adequate level of sedation was obtained a digital rectal exam was completed. Then the lubricated tip of the Cypyksk-COBYX-074 diagnostic video colonoscope was inserted and advanced without difficulty to the cecum using the CO2 insufflation technique. The cecum was identified by localizing the trifold, the appendix and the ileocecal valve. A routine second examination of the cecum/ascending colon was performed. Withdrawal was begun with thorough washing and careful examination of the colonic walls and folds. Photodocumentation was obtained. The bowel prep was good. Views of the colon were excellent with washing.  I then carefully withdrew the instrument from the patient who tolerated the procedure well. Complications: None    EGD findings:      1. Esophagus: The squamocolumnar junction was noted at 37 cm and appeared regular with a few island of salmon mucosa biopsied to rule out moody's. The GE junction was noted at 37 cm from the incisors. No significant hiatal hernia. The esophageal mucosa appeared normal. There was no evidence of esophagitis, stricture  2. Stomach: The stomach distended normally. Normal rugal folds were seen. The pylorus was patent. The gastric mucosa appeared erythematous with erosions and one 6 mm clean based ulcer in the antrum. Biopsies taken to rule out Hpylori Retroflexion revealed a normal fundus and a normal cardia. 3. Duodenum: The duodenal mucosa appeared normal in the 1st and 2nd portion of the duodenum. Biopsied to rule out celiac    Colonoscopy findings:    1. MONICA: normal rectal tone, no masses palpated. 2. No polyp(s) noted   3. Terminal ileum: the visualized mucosa appeared normal.  4. The colonic mucosa throughout the colon showed normal vascular pattern, without evidence of angioectasias or inflammation. 5. Diverticulosis: left sided. 6. A retroflexed view of the rectum revealed hemorrhoids. Recommend:  Repeat colonoscopy in 10 years pending pathology. If new signs or symptoms develop, colonoscopy may need to be repeated sooner. High fiber diet. Monitor for blood in the stool. If having more than just tinge of blood, call office or go to the ER. Omeprazole or pantoprazole 40 mg daily before breakfast  Avoid NSAIDs as possible    >>>If tissue was obtained and you have not received your pathology results either by phone or letter within 2 weeks, please call our office at [phone].     Specimens: gastric biopsies, duodenal biopsies, distal esophageal biopsies

## 2023-09-25 NOTE — TELEPHONE ENCOUNTER
----- Message from Marcelino Chavez PA-C sent at 9/25/2023  8:06 AM CDT -----  Please inform patient of results. CT did not demonstrate any acute diverticulitis. She has planned cln/EGD for today. Myself or Dr. Shaka Gutierrez will reach out with cln/EGD results.      Marcelino Chavez PA-C

## 2023-09-25 NOTE — ANESTHESIA PREPROCEDURE EVALUATION
Anesthesia PreOp Note    HPI:     Davey Pal is a 80year old female who presents for preoperative consultation requested by: Hanna Pichardo MD    Date of Surgery: 9/25/2023    Procedure(s):  COLONOSCOPY/ ESOPHAGOGASTRODUODENOSCOPY  ESOPHAGOGASTRODUODENOSCOPY (EGD)  Indication: Anemia, unspecified type / Gastroesophageal reflux disease, unspecified whether esophagitis present / Diarrhea, unspecified type    Relevant Problems   No relevant active problems       NPO:                         History Review:  Patient Active Problem List    Iron deficiency anemia, unspecified         Date Noted: 07/18/2023        Past Medical History:   Diagnosis Date    Asthma     Essential hypertension     High cholesterol     Sleep apnea        Past Surgical History:   Procedure Laterality Date    CHOLECYSTECTOMY      SPINE SURGERY PROCEDURE UNLISTED         Omeprazole 40 MG Oral Capsule Delayed Release, Take 1 capsule (40 mg total) by mouth daily. Take 1 capsule by mouth daily before breakfast., Disp: 30 capsule, Rfl: 11  polyethylene glycol, PEG 3350-KCl-NaBcb-NaCl-NaSulf, 236 g Oral Recon Soln, Take 4,000 mL by mouth As Directed. Take 2,000 mL the night before your procedure and 2,000 mL the morning of your procedure., Disp: 1 each, Rfl: 0  allopurinol 100 MG Oral Tab, Take 1 tablet (100 mg total) by mouth 2 (two) times daily. , Disp: , Rfl:   aspirin 325 MG Oral Tab, Take 1 tablet (325 mg total) by mouth daily. , Disp: , Rfl:   losartan 100 MG Oral Tab, Take 1 tablet (100 mg total) by mouth daily. , Disp: , Rfl:   chlorthalidone 25 MG Oral Tab, Take 0.5 tablets (12.5 mg total) by mouth daily. , Disp: , Rfl:   metoprolol succinate ER 25 MG Oral Tablet 24 Hr, Take 1 tablet (25 mg total) by mouth daily. , Disp: , Rfl:   mycophenolate mofetil 250 MG Oral Cap, Take 6 capsules (1,500 mg total) by mouth 2 (two) times daily before meals. , Disp: , Rfl:   tacrolimus 1 MG Oral Cap, Take 1 capsule (1 mg total) by mouth 2 (two) times daily.  Take 4 tables morning  Take 3 tables evening, Disp: , Rfl:   sertraline 50 MG Oral Tab, Take 1.5 tablets (75 mg total) by mouth daily. , Disp: , Rfl:   traZODone 50 MG Oral Tab, Take 1 tablet (50 mg total) by mouth nightly., Disp: , Rfl:   acetaminophen 325 MG Oral Tab, Take 1 tablet (325 mg total) by mouth every 6 (six) hours as needed for Pain., Disp: , Rfl:       lactated ringers infusion, , Intravenous, Continuous, Kayce Clinton MD    No current [facility]-ordered outpatient medications on file. Dairy Products          HIVES, DIARRHEA  Codeine                 NAUSEA AND VOMITING    Family History   Problem Relation Age of Onset    Diabetes Mother     Hypertension Mother     Other (Other) Brother      Social History    Socioeconomic History      Marital status:     Tobacco Use      Smoking status: Never      Smokeless tobacco: Never    Vaping Use      Vaping Use: Never used    Substance and Sexual Activity      Alcohol use: Yes      Drug use: Yes        Types: Cannabis      Available pre-op labs reviewed. Lab Results   Component Value Date    WBC 7.4 07/14/2023    RBC 4.40 07/14/2023    HGB 11.1 (L) 07/14/2023    HCT 35.3 07/14/2023    MCV 80.2 07/14/2023    MCH 25.2 (L) 07/14/2023    MCHC 31.4 (L) 07/14/2023    RDW 13.0 07/14/2023     07/14/2023     Lab Results   Component Value Date     07/14/2023    K 3.9 07/14/2023     07/14/2023    CO2 26 07/14/2023    BUN 33 (H) 07/14/2023    CREATSERUM 1.31 (H) 07/14/2023    GLU 91 07/14/2023    CA 9.8 07/14/2023          Vital Signs: There is no height or weight on file to calculate BMI.   vitals were not taken for this visit. There were no vitals filed for this visit.      Anesthesia Evaluation     Patient summary reviewed and Nursing notes reviewed    Airway   Mallampati: II  Dental      Pulmonary     breath sounds clear to auscultation  Cardiovascular   (+) hypertension    Rhythm: regular  Rate: normal    Neuro/Psych - negative ROS GI/Hepatic/Renal    (+) GERD    Endo/Other      Comments: Anemia  obese  Abdominal                  Anesthesia Plan:   ASA:  2  Plan:   MAC  Informed Consent Plan and Risks Discussed With:  Patient  Discussed plan with:  Surgeon      I have informed Mackenzie Cárdenasar and/or legal guardian or family member of the nature of the anesthetic plan, benefits, risks including possible dental damage if relevant, major complications, and any alternative forms of anesthetic management. All of the patient's questions were answered to the best of my ability. The patient desires the anesthetic management as planned.   Diego Weinstein CRNA  9/25/2023 12:41 PM  Present on Admission:  **None**

## 2023-09-25 NOTE — TELEPHONE ENCOUNTER
Clinic visit first or second week of November with preclinical labs-- ok to double book  Ok to do scope around Dec/Dario    Thank you.

## 2023-09-25 NOTE — TELEPHONE ENCOUNTER
Dr. Doug Lovelace-   Your next opening around the time frame you requested is Jan 2, 2024. Would this date be ok or is that too far out? Please advise. Thank you!

## 2023-09-25 NOTE — TELEPHONE ENCOUNTER
Patient contacted, name/ verified. Relayed entire message below from Eloise. Also discussed colonoscopy/egd arrival time today. Patient verbalized understanding. All questions answered.

## 2023-09-25 NOTE — DISCHARGE INSTRUCTIONS
Home Care Instructions for Colonoscopy and Gastroscopy with Sedation    Diet:  - Resume your regular diet as tolerated unless otherwise instructed. - Start with light meals to minimize bloating.  - Do not drink alcohol today. Medication:  - If you have questions about resuming your normal medications, please contact your Primary Care Physician. Activities:  - Take it easy today. Do not return to work today. - Do not drive today. - Do not operate any machinery today (including kitchen equipment). Colonoscopy:  - You may notice some rectal \"spotting\" (a little blood on the toilet tissue) for a day or two after the exam. This is normal.  - If you experience any rectal bleeding (not spotting), persistent tenderness or sharp severe abdominal pains, oral temperature over 100 degrees Fahrenheit, light-headedness or dizziness, or any other problems, contact your doctor. Gastroscopy:  - You may have a sore throat for 2-3 days following the exam. This is normal. Gargling with warm salt water (1/2 tsp salt to 1 glass warm water) or using throat lozenges will help. - If you experience any sharp pain in your neck, abdomen or chest, vomiting of blood, oral temperature over 100 degrees Fahrenheit, light-headedness or dizziness, or any other problems, contact your doctor. **If unable to reach your doctor, please go to the [facility] Emergency Room**    - Your referring physician will receive a full report of your examination.  - If you do not hear from your doctor's office within two weeks of your biopsy, please call them for your results. You may be able to see your laboratory results in [facility] between 4 and 7 business days. In some cases, your physician may not have viewed the results before they are released to [address] If you have questions regarding your results contact the physician who ordered the test/exam by phone or via [address] by choosing \"Ask a Medical Question. \"

## 2023-09-25 NOTE — TELEPHONE ENCOUNTER
Follow up in office with me in 6 weeks  Recall EGD in 8-10 weeks  Depending on diarrhea (if improved or not) will consider flex sig as well  May have been related to GI bleeding

## 2023-09-25 NOTE — TELEPHONE ENCOUNTER
Dr. Mana Palma,   Pt is scheduled for clinic on 11/8/23. Please order labs (for [facility]) and I will also mail the orders to her home address. Of note, pt asked why she needed a repeat EGD in 8-10 weeks. I told her there was an ulceration in the stomach and that you needed to assess healing. She said she has been taking pantoprazole daily for a \"long time\" and that her pharmacist told her the omeprazole would affect her immune system and make her immunosuppressant meds less effective. Please advise if she should continue with the pantoprazole or another med.   Thanks,  Alan Burriss    Your Appointments        Wednesday November 08, 2023  9:00 AM  Follow Up Visit with Dmitry Cage MD  [address], [address] [facility],[address], [place] ([address]) [address] [facility]  [phone]

## 2023-09-26 NOTE — TELEPHONE ENCOUNTER
Recall colon in 10 years per Dr. Sandra Davalos. Colonoscopy done on 9/25/23. Health maintenance updated and message sent to pt outreach to repeat colon in 10 years.

## 2023-09-27 RX ORDER — PANTOPRAZOLE SODIUM 40 MG/1
TABLET, DELAYED RELEASE ORAL
Qty: 388 TABLET | Refills: 0 | Status: SHIPPED | OUTPATIENT
Start: 2023-09-27 | End: 2024-10-05

## 2023-09-27 NOTE — TELEPHONE ENCOUNTER
Scheduled for:  EGD 42602   Provider Name:  Dr. Phan José   Date:  12/26/2023  Location:   [facility]   Sedation:  mac  Time:  2:30 (pt is aware that [facility] will call the day before to confirm arrival time)  Prep:  Npo after midnight   Meds/Allergies Reconciled?:  Physician reviewed   Diagnosis with codes:  GERD K21.9  Anemia D64.9  Was patient informed to call insurance with codes (Y/N):  es, I confirmed [employer] with the patient. Referral sent?:  N/A  EM or [facility] notified?:  I sent an electronic request to Endo Scheduling and received a confirmation today. Medication Orders: This patient verbally confirmed that she is not taking:   Iron, blood thinners, BP meds, and is not diabetic   Not latex allergy, Not PCN allergy and does not have a pacemaker     Misc Orders:     I discussed the prep instructions with the patient which she verbally understood and is aware that I will mail the instructions today.     Further instructions given by staff:

## 2023-09-27 NOTE — TELEPHONE ENCOUNTER
Dr. Licha Gonzalez,   What labs did you want her to have drawn before her clinic with you on 11/8/23? Thanks,  Den Rajput      RN called pt x2, no answer and VM not set up yet. Pt does not have a MyChart account. Pantoprazole orders sent to pharmacy today (BID x 14 days then daily).

## 2023-09-27 NOTE — TELEPHONE ENCOUNTER
We can do the pantoprazole twice a day and then daily if omeprazole interacts with her medications     She needs the EGD for ulcer healing to confirm no precancers or cancerous changes. Thank you.

## 2023-10-06 ENCOUNTER — TELEPHONE (OUTPATIENT)
Facility: CLINIC | Age: 68
End: 2023-10-06

## 2023-10-06 DIAGNOSIS — D50.9 IRON DEFICIENCY ANEMIA, UNSPECIFIED IRON DEFICIENCY ANEMIA TYPE: Primary | ICD-10-CM

## 2023-10-06 NOTE — TELEPHONE ENCOUNTER
Dr. Kishore Rivera,   Pt is scheduled for clinic appt on 11/8. What labs do you want her to have before clinic? I need to mail the orders to her. Thanks,  Gloria García RN called and spoke to pt, informed her of pathology results again. Pt already scheduled for clinic on 11/8 and repeat EGD on 12/26/23.

## 2023-10-16 ENCOUNTER — TELEPHONE (OUTPATIENT)
Facility: CLINIC | Age: 68
End: 2023-10-16

## 2023-10-16 NOTE — TELEPHONE ENCOUNTER
RN called pt x2, no answer and VM box not set up. RN previously mailed lab orders to pt's home address on 10/11/23.

## 2023-10-23 ENCOUNTER — TELEPHONE (OUTPATIENT)
Facility: CLINIC | Age: 68
End: 2023-10-23

## 2023-10-23 DIAGNOSIS — N18.30 ANEMIA DUE TO STAGE 3 CHRONIC KIDNEY DISEASE, UNSPECIFIED WHETHER STAGE 3A OR 3B CKD: ICD-10-CM

## 2023-10-23 DIAGNOSIS — N18.30 STAGE 3 CHRONIC KIDNEY DISEASE, UNSPECIFIED WHETHER STAGE 3A OR 3B CKD (HCC): Primary | ICD-10-CM

## 2023-10-23 DIAGNOSIS — D63.1 ANEMIA DUE TO STAGE 3 CHRONIC KIDNEY DISEASE, UNSPECIFIED WHETHER STAGE 3A OR 3B CKD: ICD-10-CM

## 2023-10-23 DIAGNOSIS — E78.5 DYSLIPIDEMIA: ICD-10-CM

## 2023-10-23 NOTE — TELEPHONE ENCOUNTER
Dr. Rosalina Miller, please see note below. Noted labs ordered on 7-14 and 7-18 have already been complete and scanned in. Did you want the same labs ordered again? Appt on 10-30.

## 2023-10-25 NOTE — TELEPHONE ENCOUNTER
Rn generated orders below ,faxed to [facility] lab #[phone]. Patient informed,need 12 hours fasting for lipid test,pt verbalized understanding.

## 2023-10-25 NOTE — TELEPHONE ENCOUNTER
Cbc, cmp, fasting lipid panel  Phosp , ipth and magensium  Tacrolimus level  Iron panel / ferritin  Urineanalysis and urine protein to cr ratio  TSH   Cmv quant dna pcr    Pleas order with [facility]

## 2023-10-27 ENCOUNTER — TELEPHONE (OUTPATIENT)
Facility: CLINIC | Age: 68
End: 2023-10-27

## 2023-10-27 NOTE — TELEPHONE ENCOUNTER
Spoke to lab. Gave diagnosis codes for folate, vit b12 and thyroid levels. Verbalized understanding.

## 2023-10-27 NOTE — TELEPHONE ENCOUNTER
Pt is currently at lab to complete orders and was advised to contact the office to get codes.  Attempting to call Rn now please call

## 2023-10-30 ENCOUNTER — OFFICE VISIT (OUTPATIENT)
Facility: CLINIC | Age: 68
End: 2023-10-30

## 2023-10-30 VITALS
SYSTOLIC BLOOD PRESSURE: 142 MMHG | DIASTOLIC BLOOD PRESSURE: 70 MMHG | HEART RATE: 65 BPM | BODY MASS INDEX: 27.49 KG/M2 | HEIGHT: 65 IN | WEIGHT: 165 LBS

## 2023-10-30 DIAGNOSIS — I10 HYPERTENSION, UNSPECIFIED TYPE: ICD-10-CM

## 2023-10-30 DIAGNOSIS — D63.1 ANEMIA DUE TO STAGE 3 CHRONIC KIDNEY DISEASE, UNSPECIFIED WHETHER STAGE 3A OR 3B CKD: ICD-10-CM

## 2023-10-30 DIAGNOSIS — E78.5 DYSLIPIDEMIA: ICD-10-CM

## 2023-10-30 DIAGNOSIS — F32.A DEPRESSION, UNSPECIFIED DEPRESSION TYPE: ICD-10-CM

## 2023-10-30 DIAGNOSIS — I50.32 CHRONIC DIASTOLIC CONGESTIVE HEART FAILURE (HCC): ICD-10-CM

## 2023-10-30 DIAGNOSIS — M1A.9XX0 CHRONIC GOUT WITHOUT TOPHUS, UNSPECIFIED CAUSE, UNSPECIFIED SITE: ICD-10-CM

## 2023-10-30 DIAGNOSIS — N18.30 ANEMIA DUE TO STAGE 3 CHRONIC KIDNEY DISEASE, UNSPECIFIED WHETHER STAGE 3A OR 3B CKD: ICD-10-CM

## 2023-10-30 DIAGNOSIS — N18.30 STAGE 3 CHRONIC KIDNEY DISEASE, UNSPECIFIED WHETHER STAGE 3A OR 3B CKD (HCC): Primary | ICD-10-CM

## 2023-10-30 DIAGNOSIS — Z48.22 ENCOUNTER FOR AFTERCARE FOLLOWING KIDNEY TRANSPLANT: ICD-10-CM

## 2023-10-30 PROCEDURE — 3008F BODY MASS INDEX DOCD: CPT | Performed by: INTERNAL MEDICINE

## 2023-10-30 PROCEDURE — 99214 OFFICE O/P EST MOD 30 MIN: CPT | Performed by: INTERNAL MEDICINE

## 2023-10-30 PROCEDURE — 3078F DIAST BP <80 MM HG: CPT | Performed by: INTERNAL MEDICINE

## 2023-10-30 PROCEDURE — 3077F SYST BP >= 140 MM HG: CPT | Performed by: INTERNAL MEDICINE

## 2023-10-30 RX ORDER — PANTOPRAZOLE SODIUM 40 MG/1
40 TABLET, DELAYED RELEASE ORAL DAILY
Qty: 30 TABLET | Refills: 3 | Status: SHIPPED | OUTPATIENT
Start: 2023-10-30 | End: 2024-02-27

## 2023-10-30 RX ORDER — MAGNESIUM OXIDE 400 MG/1
400 TABLET ORAL DAILY
Qty: 30 TABLET | Refills: 3 | Status: SHIPPED | OUTPATIENT
Start: 2023-10-30

## 2023-10-30 NOTE — PROGRESS NOTES
[place] [facility]    Progress Note     Mackenzie Nelson    81 y/o with remote h/o reflux nephropathy s/p ureteral implantation with resultant renal failure requiring preemptive LRKT 2002( from her sister,  six antigen match at [facility] ),  now with CKD, HLD, Anemia, depression , gout , G1 DD, s/p covid for 5 mth in 2022. s/p monoclonal AB infusion. previously seen by nephrologists but now established care at [facility]. Was in [place] and now back   she has no PCP- her  Dr Joselyn Myrick manages most of her things. I have given names of PCP to her to establish care.'  after reviewing labs  in sept i had decreased her chlorthalidone to 12.5 mg/day. She is tolerating well, no edema and good BP    1/13/23: doing ok. feels fatigued and sleepy, thinks psych medication is contributing  7/18/23 had another episode of covid while travelling. Feeling v weak  10/30/23: here for fu. Had EGD/colonoscopy on 9/25/23- egd with gastric ulcer and colonoscopy unremarkable. Repeat egd planned. Colonoscopy in 10yrs  Feels fatigued and tired  Labs not done- although orders were sent to [facility]  h/o reflux nephropathy s/p ureteral implantation with resultant renal failure requiring preemptive LRKT 2002( from her sister,  six antigen match at [facility]     HISTORY:  Past Medical History:   Diagnosis Date    Essential hypertension     High blood pressure     High cholesterol     Renal disorder     kidney transplant 21 years ago      Past Surgical History:   Procedure Laterality Date    CHOLECYSTECTOMY      COLONOSCOPY N/A 9/25/2023    Procedure: COLONOSCOPY;  Surgeon: Sascha Seymour MD;  Location: [facility] ENDOSCOPY    HYSTERECTOMY      KIDNEY SURGERY      Kidney transplant    SPINE SURGERY PROCEDURE UNLISTED             Medications (Active prior to today's visit):  Current Outpatient Medications   Medication Sig Dispense Refill    magnesium oxide 400 MG Oral Tab Take 1 tablet (400 mg total) by mouth daily.  30 tablet 3    pantoprazole 40 MG Oral Tab EC Take 1 tablet (40 mg total) by mouth daily. 30 tablet 3    Beclomethasone Diprop HFA (QVAR REDIHALER) 80 MCG/ACT Inhalation Aerosol, Breath Activated 1 puff. Cholecalciferol (VITAMIN D3) 125 MCG (5000 UT) Oral Tablet Dispersible as directed Orally      polyethylene glycol, PEG 3350-KCl-NaBcb-NaCl-NaSulf, 236 g Oral Recon Soln Take 4,000 mL by mouth As Directed. Take 2,000 mL the night before your procedure and 2,000 mL the morning of your procedure. 1 each 0    allopurinol 100 MG Oral Tab Take 1 tablet (100 mg total) by mouth 2 (two) times daily. losartan 100 MG Oral Tab Take 1 tablet (100 mg total) by mouth daily. chlorthalidone 25 MG Oral Tab Take 0.5 tablets (12.5 mg total) by mouth daily. metoprolol succinate ER 25 MG Oral Tablet 24 Hr Take 1 tablet (25 mg total) by mouth daily. mycophenolate mofetil 250 MG Oral Cap Take 6 capsules (1,500 mg total) by mouth 2 (two) times daily before meals. tacrolimus 1 MG Oral Cap Take 1 capsule (1 mg total) by mouth 2 (two) times daily. Take 4 tables morning  Take 3 tables evening      sertraline 50 MG Oral Tab Take 1.5 tablets (75 mg total) by mouth daily. traZODone 50 MG Oral Tab Take 1 tablet (50 mg total) by mouth nightly. acetaminophen 325 MG Oral Tab Take 1 tablet (325 mg total) by mouth every 6 (six) hours as needed for Pain.          Allergies:    Dairy Products          HIVES, DIARRHEA  Codeine                 NAUSEA AND VOMITING      ROS:     Constitutional:  Negative for decreased activity, fever, irritability and lethargy  ENMT:  Negative for ear drainage, hearing loss and nasal drainage  Eyes:  Negative for eye discharge and vision loss  Cardiovascular:  Negative for chest pain, sob  Respiratory:  Negative for cough, dyspnea and wheezing  Gastrointestinal:  Negative for abdominal pain, constipation  Genitourinary:  Negative for dysuria and hematuria  Endocrine:  Negative for abnormal sleep patterns  Hema/Lymph: Negative for easy bleeding and easy bruising  Integumentary:  Negative for pruritus and rash  Musculoskeletal:  Negative for bone/joint symptoms  Neurological:  Negative for gait disturbance  Psychiatric:  Negative for inappropriate interaction and psychiatric symptoms       10/30/23  0913   BP: 142/70   Pulse: 65         PHYSICAL EXAM:   Constitutional: appears well hydrated alert and responsive   Head/Face: normocephalic  Eyes/Vision: normal extraocular motion is intact  Nose/Mouth/Throat:mucous membranes are moist   Neck/Thyroid: neck is supple without adenopathy  Respiratory:  lungs are clear to auscultation bilaterally  Cardiovascular: regular rate and rhythm   Abdomen: soft, non-tender, non-distended, BS normal  Skin/Hair: no unusual rashes present, no abnormal bruising noted  Musculoskeletal: no deformities  Extremities: no edema  Neurological:  Grossly normal      Lab Results   Component Value Date     07/14/2023    K 3.9 07/14/2023     07/14/2023    CO2 26 07/14/2023    BUN 33 (H) 07/14/2023    CREATSERUM 1.31 (H) 07/14/2023    CA 9.8 07/14/2023    EGFRCR 45 (L) 07/14/2023    ALB 4.1 07/14/2023 2014-2019 labs reviewed cr 1.2-1.5 mg/dl  sept and dec 2021- cr 1.4mg/dl, eGFR 38 ml/min  5/2022 -1.7 mg/dl   CMV/BKV 2021 negative  [place] UA 2021  tac level 6 in 2021  May 2022 labs cr 1.7 mg/dl , BUn 41,  eGFR  30 ml/min, hgb 10.3  11/8/2022: cr 1.5 mg/dl, eGFR 37 ml/min, all electrolytes ok, ldl 102, ipth 77, UPC negative, hgb 10.4, bland UA. low iron sat, vit b 12 ok, vit d ok, tac 6.6  7/18/2023 cr 1.3 mg/dl, eGFR 45 ml/min , k 3.9, mag 1.4, phosp 4.4, TSH 5.0 free t 4 1.1 (normal), hgb 11.1,iron saturation 12 %, ferritin 7, tacrolimus 5.3  , ,    All labs at [facility]     ASSESSMENT/PLAN:   Assessment   1. Stage 3 chronic kidney disease, unspecified whether stage 3a or 3b CKD (HCC)  - Hemoglobin A1C; Future    2. Encounter for aftercare following kidney transplant    3. Hypertension, unspecified type    4. Anemia due to stage 3 chronic kidney disease, unspecified whether stage 3a or 3b CKD     5. Chronic diastolic congestive heart failure ([facility] [address]    6. Dyslipidemia    7. Chronic gout without tophus, unspecified cause, unspecified site    8. 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- with most recent cr 1.7 mg/dl, higher than her b/l of 1.5 mg/dl  [place] UA with no protein or blood in past  repeat labs still with elevated cr in sept (1.6 mg/dl)   decreased chlorthalidone to 12.5 mg/day  repeat labs in nov with improved cr back to her b/l 1.5 mg/dl,   Recent cr 1.3 mg/dl  Low mag-start  mag oxide 400 mg by mouth daily       H/o kidney transplant  s/p LRKT in 2002 - no post op complications, no rejections  on tacrolimus 4 mg in am and 3 mg in pm and cellcept 500 mg bid  tac levels 4-6 range    HTN  target BP < 140/90  on metoprolol, losartan and chlorthalidone (1/2 tablet)  + low salt diet  well controlled    Anemia  iron def but cannot take PO iron  S/p EGD and colonoscopy with Dr Bryn Painting (9/25/23) - gastric ulcer. On ppi and repeat EGD.  Colonoscopy negative  vitamin b12 and FA acceptable  Low iron stores (sats 12 and ferritin 7) - s/p 5 doses iv iron  New labs to be done    CHF  Grade 1 DD- follows with DR Juan C Escobar at [facility].  Compensated   On BB and diuretic    Hyperlipidemia  elevated FLP, have made dietary modification  not on any statin  LDL worse again--> may need to restart statin    Gout   On allopurinol    Depression  Follows up with Dr Collin Dawn  needs therapy for 6 weeks- not started as yet    Health maintain  she needs a pcp   recommend getting mammogram,  PAP. -has been losing weight as well  refer to derm for screening as well    Had ordered cmv, ebc and bk      PLAN  Start mag oxide 400 mg daily  Labs to be done --> will call her with results  Needs PCP ( roselia maintenance) and derm for skin surveillance  Labs and fu in 6mth         Orders This Visit:  Orders Placed This Encounter      Hemoglobin A1C      Meds This Visit:  Requested Prescriptions     Signed Prescriptions Disp Refills    magnesium oxide 400 MG Oral Tab 30 tablet 3     Sig: Take 1 tablet (400 mg total) by mouth daily. pantoprazole 40 MG Oral Tab EC 30 tablet 3     Sig: Take 1 tablet (40 mg total) by mouth daily.        Imaging & Referrals:  None       Kristine Diaz MD  10/30/2023

## 2023-10-30 NOTE — TELEPHONE ENCOUNTER
Received call from Dr. Med Conner stating [facility] only ran 5 labs that were ordered. Called [facility] at [phone]. Faxed order for A1C to [phone]. States can run A1C with other doctors lab vial that was drawn on the 27th. Pending confirmation. States can add lipid panel and CMV to specimen from 27th. States pt will have to have tacrolimus level, pth and cmp repeated with new draw. Also will need urinalysis and urine protein/creatinine ratio as no urine specimen was previously given. States will fax ferritin, cbc and iron/TIBC results complete on 10-27 ordered by Dr. Dario Sanford to our office at [phone]. Pending fax. Nurse is mailing pt lab orders for cmp, pth, tacrolimus level, UA and urine protein/creat ratio to address on file with note relaying this same msg for pt to give to Karyn Conner requested to give pt information for new pcp. Mailed list of [place] pcp's and the phone number to call ([phone]).

## 2023-10-31 ENCOUNTER — APPOINTMENT (OUTPATIENT)
Dept: URBAN - METROPOLITAN AREA CLINIC 248 | Age: 68
Setting detail: DERMATOLOGY
End: 2023-10-31

## 2023-10-31 DIAGNOSIS — L21.8 OTHER SEBORRHEIC DERMATITIS: ICD-10-CM

## 2023-10-31 DIAGNOSIS — L71.8 OTHER ROSACEA: ICD-10-CM

## 2023-10-31 DIAGNOSIS — L57.0 ACTINIC KERATOSIS: ICD-10-CM

## 2023-10-31 DIAGNOSIS — L81.4 OTHER MELANIN HYPERPIGMENTATION: ICD-10-CM

## 2023-10-31 LAB
% SATURATION: 20 % (CALC) (ref 16–45)
ABSOLUTE BASOPHILS: 47 CELLS/UL (ref 0–200)
ABSOLUTE EOSINOPHILS: 277 CELLS/UL (ref 15–500)
ABSOLUTE LYMPHOCYTES: 2109 CELLS/UL (ref 850–3900)
ABSOLUTE MONOCYTES: 545 CELLS/UL (ref 200–950)
ABSOLUTE NEUTROPHILS: 4922 CELLS/UL (ref 1500–7800)
BASOPHILS: 0.6 %
EOSINOPHILS: 3.5 %
FERRITIN: 173 NG/ML (ref 16–288)
HEMATOCRIT: 36.2 % (ref 35–45)
HEMOGLOBIN: 11.9 G/DL (ref 11.7–15.5)
IRON BINDING CAPACITY: 326 MCG/DL (CALC) (ref 250–450)
IRON, TOTAL: 64 MCG/DL (ref 45–160)
LYMPHOCYTES: 26.7 %
MCH: 27.6 PG (ref 27–33)
MCHC: 32.9 G/DL (ref 32–36)
MCV: 84 FL (ref 80–100)
MONOCYTES: 6.9 %
MPV: 10.6 FL (ref 7.5–12.5)
NEUTROPHILS: 62.3 %
PLATELET COUNT: 276 THOUSAND/UL (ref 140–400)
RDW: 13.3 % (ref 11–15)
RED BLOOD CELL COUNT: 4.31 MILLION/UL (ref 3.8–5.1)
WHITE BLOOD CELL COUNT: 7.9 THOUSAND/UL (ref 3.8–10.8)

## 2023-10-31 PROCEDURE — 17000 DESTRUCT PREMALG LESION: CPT

## 2023-10-31 PROCEDURE — 17003 DESTRUCT PREMALG LES 2-14: CPT

## 2023-10-31 PROCEDURE — 99214 OFFICE O/P EST MOD 30 MIN: CPT | Mod: 25

## 2023-10-31 PROCEDURE — OTHER PRESCRIPTION MEDICATION MANAGEMENT: OTHER

## 2023-10-31 PROCEDURE — OTHER COUNSELING: OTHER

## 2023-10-31 PROCEDURE — OTHER PRESCRIPTION: OTHER

## 2023-10-31 PROCEDURE — OTHER LIQUID NITROGEN: OTHER

## 2023-10-31 RX ORDER — MINOCYCLINE 15 MG/G
AEROSOL, FOAM TOPICAL
Qty: 30 | Refills: 6 | Status: ERX | COMMUNITY
Start: 2023-10-31

## 2023-10-31 RX ORDER — FLUOCINOLONE ACETONIDE 0.11 MG/ML
OIL AURICULAR (OTIC)
Qty: 20 | Refills: 2 | Status: ERX | COMMUNITY
Start: 2023-10-31

## 2023-10-31 RX ORDER — HYDROQUINONE 4 %
CREAM (GRAM) TOPICAL
Qty: 30 | Refills: 1 | Status: ERX | COMMUNITY
Start: 2023-10-31

## 2023-10-31 RX ORDER — FLUOCINOLONE ACETONIDE 0.11 MG/ML
OIL AURICULAR (OTIC)
Qty: 20 | Refills: 2 | Status: CANCELLED
Stop reason: SDUPTHER

## 2023-10-31 ASSESSMENT — LOCATION DETAILED DESCRIPTION DERM
LOCATION DETAILED: RIGHT SUPERIOR MEDIAL FOREHEAD
LOCATION DETAILED: SUPERIOR MID FOREHEAD
LOCATION DETAILED: LEFT LOWER CUTANEOUS LIP
LOCATION DETAILED: RIGHT CAVUM CONCHA
LOCATION DETAILED: RIGHT SUPERIOR FOREHEAD
LOCATION DETAILED: LEFT INFERIOR MEDIAL MALAR CHEEK
LOCATION DETAILED: RIGHT MEDIAL MALAR CHEEK
LOCATION DETAILED: LEFT CAVUM CONCHA
LOCATION DETAILED: RIGHT INFERIOR CENTRAL MALAR CHEEK

## 2023-10-31 ASSESSMENT — LOCATION ZONE DERM
LOCATION ZONE: LIP
LOCATION ZONE: FACE
LOCATION ZONE: EAR

## 2023-10-31 ASSESSMENT — LOCATION SIMPLE DESCRIPTION DERM
LOCATION SIMPLE: LEFT EAR
LOCATION SIMPLE: SUPERIOR FOREHEAD
LOCATION SIMPLE: RIGHT CHEEK
LOCATION SIMPLE: LEFT CHEEK
LOCATION SIMPLE: RIGHT EAR
LOCATION SIMPLE: RIGHT FOREHEAD
LOCATION SIMPLE: LEFT LIP

## 2023-10-31 NOTE — PROCEDURE: PRESCRIPTION MEDICATION MANAGEMENT
Render In Strict Bullet Format?: No
Detail Level: Zone
Initiate Treatment: Flac Otic (ear) Oil 0.01 % drops \\nQuantity: 20.0 ml  Days Supply: 30\\nSig: Apply to ears daily as needed with itch
Samples Given: Zilixi
Initiate Treatment: hydroquinone 4 % topical cream \\nQuantity: 30.0 g  Days Supply: 30\\nSig: Apply to affected areas of hyperpigmentation nightly for 3 months.

## 2023-11-01 LAB — HEMOGLOBIN A1C: 5.5 % OF TOTAL HGB

## 2023-11-03 LAB
CHOL/HDLC RATIO: 5.1 (CALC)
CHOLESTEROL, TOTAL: 238 MG/DL
FOLATE, SERUM: 16 NG/ML
HDL CHOLESTEROL: 47 MG/DL
LDL-CHOLESTEROL: 161 MG/DL (CALC)
MAGNESIUM: 1.4 MG/DL (ref 1.5–2.5)
NON-HDL CHOLESTEROL: 191 MG/DL (CALC)
PHOSPHATE (AS PHOSPHORUS): 4 MG/DL (ref 2.1–4.3)
T4, FREE: 1 NG/DL (ref 0.8–1.8)
TRIGLYCERIDES: 156 MG/DL
TSH W/REFLEX TO FT4: 5.49 MIU/L (ref 0.4–4.5)
VITAMIN B12: 1555 PG/ML (ref 200–1100)

## 2023-11-06 ENCOUNTER — TELEPHONE (OUTPATIENT)
Dept: NEPHROLOGY | Facility: CLINIC | Age: 68
End: 2023-11-06

## 2023-11-06 NOTE — TELEPHONE ENCOUNTER
See TE from 10-23. Pt still needs cmp, pth, tacrolimus, UA, urine protein/creat ratio, repeat CMV and possibly repeat lipid panel if pt was not fasting >12 hrs on 10-27 labs. Faxed these orders to [employer] (#[phone]). Pending confirmation. Also mailed pt what was faxed to [employer]. Lmtcb and [employer] msg sent.

## 2023-11-06 NOTE — TELEPHONE ENCOUNTER
Spoke to pt. Let know to disregard the lipid panel order as she states she WAS fasting for labs drawn on the 27th. Let know repeat CMV, tacrolimus, UA, urine protein/creat ratio, cmp and pth needed to be drawn. Faxed to [employer] and mailed to pt. Also let pt know pcp list and number was mailed to her. Will get back with her regarding the pain after hearing from MD. Pt states it is all of a sudden and kind of feels like arthritic pain.

## 2023-11-06 NOTE — TELEPHONE ENCOUNTER
Patient is calling to request [facility] to complete the ones that were initially wrong when she went. Patient is also saying that the magnesium is giving her body aches and stiffness.  Please call

## 2023-11-06 NOTE — TELEPHONE ENCOUNTER
Dr. Josse Sandhu, already spoke with you about the labs. Pt is c/o the mg giving her body aches/stiffness. Lmtcb.

## 2023-11-08 ENCOUNTER — OFFICE VISIT (OUTPATIENT)
Dept: GASTROENTEROLOGY | Facility: CLINIC | Age: 68
End: 2023-11-08

## 2023-11-08 VITALS
WEIGHT: 172.81 LBS | HEART RATE: 76 BPM | SYSTOLIC BLOOD PRESSURE: 145 MMHG | DIASTOLIC BLOOD PRESSURE: 79 MMHG | HEIGHT: 65 IN | BODY MASS INDEX: 28.79 KG/M2

## 2023-11-08 DIAGNOSIS — K25.9 GASTRIC ULCER, UNSPECIFIED CHRONICITY, UNSPECIFIED WHETHER GASTRIC ULCER HEMORRHAGE OR PERFORATION PRESENT: Primary | ICD-10-CM

## 2023-11-08 DIAGNOSIS — R14.0 BLOATING: ICD-10-CM

## 2023-11-08 PROCEDURE — 3077F SYST BP >= 140 MM HG: CPT | Performed by: INTERNAL MEDICINE

## 2023-11-08 PROCEDURE — 3008F BODY MASS INDEX DOCD: CPT | Performed by: INTERNAL MEDICINE

## 2023-11-08 PROCEDURE — 99214 OFFICE O/P EST MOD 30 MIN: CPT | Performed by: INTERNAL MEDICINE

## 2023-11-08 PROCEDURE — 3078F DIAST BP <80 MM HG: CPT | Performed by: INTERNAL MEDICINE

## 2023-11-08 RX ORDER — MINOCYCLINE 15 MG/G
1 AEROSOL, FOAM TOPICAL DAILY
COMMUNITY
Start: 2023-10-31

## 2023-11-08 RX ORDER — ESCITALOPRAM OXALATE 10 MG/1
10 TABLET ORAL DAILY
COMMUNITY

## 2023-11-08 RX ORDER — CYANOCOBALAMIN 1000 UG/ML
1000 INJECTION, SOLUTION INTRAMUSCULAR; SUBCUTANEOUS
COMMUNITY
Start: 2023-05-22

## 2023-11-08 RX ORDER — HYDROQUINONE 40 MG/G
1 CREAM TOPICAL 2 TIMES DAILY
COMMUNITY
Start: 2023-10-31

## 2023-11-08 RX ORDER — FERROUS SULFATE 325(65) MG
1 TABLET ORAL
COMMUNITY
Start: 2021-09-24

## 2023-11-08 NOTE — PATIENT INSTRUCTIONS
1. Gastric ulcer  2. Bloating   3. Urgency but more formed. Improved after colonoscopy      Recommend:   Add miralax and metamucil daily  Low FODMAP diet  Pantoprazole daily before breakfast  EGD scheduled 12/28/2023  Here are some reflux precautions:   - Avoid trigger foods  - Anti-reflux measures: raising the head of the bed at night, avoiding tight clothing or belts, avoiding eating late at night and not lying down shortly after mealtime and achieving weight loss   - Avoid NSAID's, caffeine, peppermints, alcohol, tobacco and foods that incite symptoms   Add more activity  Establish with primary-- Dr. Shiraz Reeves to Dr. Arnulfo Rodriguez

## 2023-11-08 NOTE — PROGRESS NOTES
[address] Gastroenterology  Clinic Follow-up Visit    Chief Complaint   Patient presents with    Follow - Up     Last Colon and Egd was 9/25/2023         Subjective/HPI:   Emilia Leroy is a 80year old female, patient of Dr. Dr. Adi Rajput with history of reflux nephropathy s/p ureteral implantation with resultant renal failure requiring preemptive LRKT 2002( from her sister,  six antigen match at [facility] ),  now with CKD, HLD, Anemia, depression , gout , G1 DD, s/p covid for 5 mth in 2022. s/p monoclonal AB infusion who presents for ANAHI and follow up for EGD/colonoscopy      Past Visit(s):  Gregg Nelson is a 80year old year-old female with history of h/o reflux nephropathy s/p ureteral implantation with resultant renal failure requiring preemptive LRKT 2002( from her sister,  six antigen match at [facility] ),  now with CKD, HLD, Anemia, depression , gout , G1 DD, s/p covid for 5 mth in 2022. s/p monoclonal AB infusion who presents for evaluation of ANAHI. Patient has completed 5 days of IV venofer. GERD- She is on protonix. She will get epigastric pain that is very uncomfortable. She often is nausea daily. Can have episodes of vomiting, but they do not occur daily. Denies hematemesis. Denies dysphagia, globus, odynophagia. Diarrhea- She has had stool testing done that is negative. She will have diarrhea about 10 mins after eating anything. She can have about 5 bowel movements per day. Nausea with food. No brbpr or melena. Feeling as though her stomach does bloat after eating. She does complain of LLQ abdominal pain. Often is sharp and shooting and is associated with diarrhea. Denies fevers, chills and sick contacts. Patient is immunocompromised.       NSAIDS: none  Tobacco: none  Alcohol: none  Marijuana: none  Illicit drugs: none     FH GI malignancy- none  FH celiac dz- none  FH liver dz- none  FH IBD- none     No history of adverse reaction to sedation  No CINDY  No anticoagulants  No pacemaker/defibrillator  No pain medications and/or sleep aides        Last colonoscopy: 10 years ago, [facility]  Last EGD: 10 years, [facility]    =======================================================================================================  In office today, pt presents for f/u. Did omeprazole for 1 week and now protonix once a day prior to eating    Overall, the patient feels better. Does not feel like energy is better  Still having bloating  GI symptoms of abdominal pain, nausea, vomiting, change in bowel habits, dyspepsia, dysphagia, hematemesis, hematochezia, or melena. Patient has a bowel movement each day to every other day. Additionally there is no change of appetite, unexpected weight loss, and no reported history of chest pain or shortness of breath. Pertinent Surgical Hx:  - See additional surgical hx below  - No known issues with sedation.  - No known history of sleep apnea. Prior endoscopies:  EGD/colonoscopy 9/25/2023  EGD findings:       1. Esophagus: The squamocolumnar junction was noted at 37 cm and appeared regular with a few island of salmon mucosa biopsied to rule out moody's. The GE junction was noted at 37 cm from the incisors. No significant hiatal hernia. The esophageal mucosa appeared normal. There was no evidence of esophagitis, stricture  2. Stomach: The stomach distended normally. Normal rugal folds were seen. The pylorus was patent. The gastric mucosa appeared erythematous with erosions and one 6 mm clean based ulcer in the antrum. Biopsies taken to rule out Hpylori Retroflexion revealed a normal fundus and a normal cardia. 3. Duodenum: The duodenal mucosa appeared normal in the 1st and 2nd portion of the duodenum. Biopsied to rule out celiac     Colonoscopy findings:     1. MONICA: normal rectal tone, no masses palpated. 2. No polyp(s) noted   3.  Terminal ileum: the visualized mucosa appeared normal.  4. The colonic mucosa throughout the colon showed normal vascular pattern, without evidence of angioectasias or inflammation. 5. Diverticulosis: left sided. 6. A retroflexed view of the rectum revealed hemorrhoids. Recommend:  Repeat colonoscopy in 10 years pending pathology. If new signs or symptoms develop, colonoscopy may need to be repeated sooner. High fiber diet. Monitor for blood in the stool. If having more than just tinge of blood, call office or go to the ER. Omeprazole or pantoprazole 40 mg daily before breakfast  Avoid NSAIDs as possible     >>>If tissue was obtained and you have not received your pathology results either by phone or letter within 2 weeks, please call our office at [phone]. Specimens: gastric biopsies, duodenal biopsies, distal esophageal biopsies  Final Diagnosis:      A. Duodenum; biopsy:  Duodenal mucosa with preserved villous architecture, lymphoid aggregates and features suggestive of chronic peptic duodenitis. B. Gastric erosions; biopsy:  Chronic inactive gastritis with focal foveolar hyperplasia and reactive atypia. Negative for intestinal metaplasia or dysplasia. Diff-Quik stain (with appropriate control reactivity) is negative for H. pylori microorganisms. C. Distal esophagus; biopsy:  Squamocolumnar mucosa with mild chronic inflammation, capillaries congestion, and focal basal cell hyperplasia, see comment. No definite evidence of intestinal metaplasia or dysplasia identified. Comment:  C) The histological findings are most compatible with reflux disease in the right clinical setting.       Wt Readings from Last 10 Encounters:   11/08/23 172 lb 12.8 oz (78.4 kg)   10/30/23 165 lb (74.8 kg)   09/25/23 165 lb (74.8 kg)   09/19/23 168 lb (76.2 kg)   07/18/23 169 lb (76.7 kg)       Social Hx:  - No smoking/etoh  - Denies illicit drug use   - NSAIDs/ASA use: PRN      History, Medications, Allergies, ROS:      Past Medical History:   Diagnosis Date    Essential hypertension     High blood pressure High cholesterol     Renal disorder     kidney transplant 21 years ago      Past Surgical History:   Procedure Laterality Date    CHOLECYSTECTOMY      COLONOSCOPY N/A 9/25/2023    Procedure: COLONOSCOPY;  Surgeon: Carolyne Chauhan MD;  Location: [facility] ENDOSCOPY    HYSTERECTOMY      KIDNEY SURGERY      Kidney transplant    SPINE SURGERY PROCEDURE UNLISTED        Family History   Problem Relation Age of Onset    Diabetes Mother     Hypertension Mother     Other (Other) Brother       Social History:   Social History     Socioeconomic History    Marital status:    Tobacco Use    Smoking status: Never    Smokeless tobacco: Never   Vaping Use    Vaping Use: Never used   Substance and Sexual Activity    Alcohol use: Not Currently    Drug use: Never        Medications (Active prior to today's visit):  Current Outpatient Medications   Medication Sig Dispense Refill    cyanocobalamin 1000 MCG/ML Injection Solution Inject 1 mL (1,000 mcg total) into the muscle every 30 (thirty) days. Ferrous Sulfate 325 (65 Fe) MG Oral Tab Take 1 tablet (325 mg total) by mouth daily with breakfast.      Hydroquinone 4 % External Cream Apply 1 Application topically 2 (two) times daily. ZILXI 1.5 % External Foam Apply 1 Application topically daily. magnesium oxide 400 MG Oral Tab Take 1 tablet (400 mg total) by mouth daily. 30 tablet 3    pantoprazole 40 MG Oral Tab EC Take 1 tablet (40 mg total) by mouth daily. 30 tablet 3    Beclomethasone Diprop HFA (QVAR REDIHALER) 80 MCG/ACT Inhalation Aerosol, Breath Activated 1 puff. Cholecalciferol (VITAMIN D3) 125 MCG (5000 UT) Oral Tablet Dispersible as directed Orally      allopurinol 100 MG Oral Tab Take 1 tablet (100 mg total) by mouth 2 (two) times daily. losartan 100 MG Oral Tab Take 1 tablet (100 mg total) by mouth daily. chlorthalidone 25 MG Oral Tab Take 0.5 tablets (12.5 mg total) by mouth daily.       metoprolol succinate ER 25 MG Oral Tablet 24 Hr Take 1 tablet (25 mg total) by mouth daily. mycophenolate mofetil 250 MG Oral Cap Take 6 capsules (1,500 mg total) by mouth 2 (two) times daily before meals. tacrolimus 1 MG Oral Cap Take 1 capsule (1 mg total) by mouth 2 (two) times daily. Take 4 tables morning  Take 3 tables evening      sertraline 50 MG Oral Tab Take 1.5 tablets (75 mg total) by mouth daily. traZODone 50 MG Oral Tab Take 1 tablet (50 mg total) by mouth nightly. acetaminophen 325 MG Oral Tab Take 1 tablet (325 mg total) by mouth every 6 (six) hours as needed for Pain.      escitalopram 10 MG Oral Tab Take 1 tablet (10 mg total) by mouth daily. polyethylene glycol, PEG 3350-KCl-NaBcb-NaCl-NaSulf, 236 g Oral Recon Soln Take 4,000 mL by mouth As Directed. Take 2,000 mL the night before your procedure and 2,000 mL the morning of your procedure. (Patient not taking: Reported on 11/8/2023) 1 each 0       Allergies: Allergies   Allergen Reactions    Dairy Products HIVES and DIARRHEA    Codeine NAUSEA AND VOMITING       ROS:   CONSTITUTIONAL:  negative for fevers, chills  EYES:  negative for change in vision  RESPIRATORY:  negative for  shortness of breath  CARDIOVASCULAR:  negative for  chest pain  GASTROINTESTINAL:  see HPI  GENITOURINARY:  negative for dysuria and hematuria   SKIN:  negative for  rash  ALLERGIC/IMMUNOLOGIC:  negative for hay fever allergies  ENDOCRINE:  negative for cold intolerance and heat intolerance  MUSCULOSKELETAL:  negative for  joint stiffness and joint swelling  BEHAVIOR/PSYCH:  negative for depressed mood    PHYSICAL EXAM:   Blood pressure 145/79, pulse 76, height 5' 5\" (1.651 m), weight 172 lb 12.8 oz (78.4 kg).     Gen: patient appears comfortable and in no acute distress  HEENT: conjunctiva pink, the sclera appears anicteric, oropharynx clear, mucus membranes appear moist  CV: regular rate and rhythm, the extremities are warm and well perfused   Lung: effort normal and breath sounds normal, no respiratory distress, wheezes or rales  GI: soft, non-tender exam in all quadrants without rigidity or guarding, non-distended, no abnormal bowel sounds noted, no masses are palpated  : no CVA tenderness  Skin: dry, warm, no jaundice  Ext: no cyanosis, clubbing or edema is evident. Neuro: Alert and oriented x4, and patient is having movements of all 4 extremities   Psych: Pt has a normal mood and affect, behavior is normal    Nursing note and vitals reviewed      Labs/Imaging:     Patient's labs and imaging were reviewed and discussed with patient today. See HPI and A&P for further details. .  ASSESSMENT/PLAN:     1. Gastric ulcer  2. Bloating   3. Urgency but more formed. Improved after colonoscopy      Recommend: Add miralax and metamucil daily  Pantoprazole daily before breakfast  EGD scheduled 12/28/2023  Here are some reflux precautions:   - Avoid trigger foods  - Anti-reflux measures: raising the head of the bed at night, avoiding tight clothing or belts, avoiding eating late at night and not lying down shortly after mealtime and achieving weight loss   - Avoid NSAID's, caffeine, peppermints, alcohol, tobacco and foods that incite symptoms   Add more activity  Establish with primary-- Dr. Angel Martinez to Dr. Marcellus Mendes This Visit:  No orders of the defined types were placed in this encounter.       Meds This Visit:  Requested Prescriptions      No prescriptions requested or ordered in this encounter       Imaging & Referrals:  None

## 2023-11-30 DIAGNOSIS — M54.10 RADICULOPATHY: ICD-10-CM

## 2023-11-30 DIAGNOSIS — M54.50 LUMBAGO: Primary | ICD-10-CM

## 2023-11-30 DIAGNOSIS — M54.30 SCIATICA: ICD-10-CM

## 2023-12-05 ENCOUNTER — TELEPHONE (OUTPATIENT)
Facility: CLINIC | Age: 68
End: 2023-12-05

## 2023-12-05 NOTE — TELEPHONE ENCOUNTER
Spoke to gabriel from [facility]. Let know pth, UA, urine protein/creat ratio, tacrolimus, cmp and CMV needed to be done. All other labs were complete in oct. Verbalized understanding.

## 2023-12-05 NOTE — TELEPHONE ENCOUNTER
Carol Oliva from [facility] states pt is currently there to complete labs and has questions about the order please call  [phone]

## 2023-12-06 ENCOUNTER — TELEPHONE (OUTPATIENT)
Dept: GASTROENTEROLOGY | Facility: CLINIC | Age: 68
End: 2023-12-06

## 2023-12-06 ENCOUNTER — HOSPITAL ENCOUNTER (OUTPATIENT)
Dept: CT IMAGING | Age: 68
Discharge: HOME | End: 2023-12-06
Attending: PSYCHIATRY & NEUROLOGY
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
## 2023-12-06 DIAGNOSIS — R55 SYNCOPE: ICD-10-CM

## 2023-12-06 DIAGNOSIS — R58 BLEEDING: ICD-10-CM
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
## 2023-12-06 DIAGNOSIS — S09.90XA HEAD TRAUMA: ICD-10-CM

## 2023-12-06 PROCEDURE — 70450 CT HEAD/BRAIN W/O DYE: CPT

## 2023-12-06 NOTE — TELEPHONE ENCOUNTER
1st,overdue reminder letter mailed out to patient   Labs and Imaging order   XR ABDOMEN (1 VIEW) (CPT=74018)   CBC, Platelet;  No Differential  Ferritin  Iron And Tibc

## 2023-12-08 LAB — TACROLIMUS, HIGHLY SENSITIVE, /MS/MS: 7.3 MCG/L

## 2023-12-14 ENCOUNTER — TELEPHONE (OUTPATIENT)
Facility: CLINIC | Age: 68
End: 2023-12-14

## 2023-12-14 DIAGNOSIS — N18.30 STAGE 3 CHRONIC KIDNEY DISEASE, UNSPECIFIED WHETHER STAGE 3A OR 3B CKD (HCC): Primary | ICD-10-CM

## 2023-12-14 LAB
ALBUMIN/GLOBULIN RATIO: 1.4 (CALC) (ref 1–2.5)
ALBUMIN: 4.3 G/DL (ref 3.6–5.1)
ALKALINE PHOSPHATASE: 94 U/L (ref 37–153)
ALT: 8 U/L (ref 6–29)
APPEARANCE: CLEAR
AST: 13 U/L (ref 10–35)
BILIRUBIN, TOTAL: 0.7 MG/DL (ref 0.2–1.2)
BILIRUBIN: NEGATIVE
BUN/CREATININE RATIO: 21 (CALC) (ref 6–22)
BUN: 28 MG/DL (ref 7–25)
CALCIUM: 9.6 MG/DL (ref 8.6–10.4)
CARBON DIOXIDE: 28 MMOL/L (ref 20–32)
CHLORIDE: 89 MMOL/L (ref 98–110)
CMV DNA, QN PCR: 2.4 LOG IU/ML
COLOR: YELLOW
CREATININE, RANDOM URINE: 74 MG/DL (ref 20–275)
CREATININE: 1.33 MG/DL (ref 0.5–1.05)
EGFR: 44 ML/MIN/1.73M2
GLOBULIN: 3 G/DL (CALC) (ref 1.9–3.7)
GLUCOSE: 98 MG/DL (ref 65–99)
GLUCOSE: NEGATIVE
KETONES: NEGATIVE
LEUKOCYTE ESTERASE: NEGATIVE
Lab: 254 IU/ML
NITRITE: NEGATIVE
OCCULT BLOOD: NEGATIVE
PARATHYROID HORMONE,$INTACT: 68 PG/ML (ref 16–77)
PH: 6.5 (ref 5–8)
POTASSIUM: 3.5 MMOL/L (ref 3.5–5.3)
PROTEIN, TOTAL, RANDOM UR: 5 MG/DL (ref 5–24)
PROTEIN, TOTAL: 7.3 G/DL (ref 6.1–8.1)
PROTEIN/CREATININE RATIO: 0.07 MG/MG CREAT (ref 0.02–0.18)
PROTEIN/CREATININE RATIO: 68 MG/G CREAT (ref 24–184)
PROTEIN: NEGATIVE
SODIUM: 127 MMOL/L (ref 135–146)
SPECIFIC GRAVITY: 1.01 (ref 1–1.03)

## 2023-12-14 NOTE — TELEPHONE ENCOUNTER
I tried calling her- and no one picked up  Will call her again  Can you please let her know kidney function is stable. However sodium is slightly low. Watch fluid intake.    Also one of the labs show cmv- low levels but since immunosuppressed because of transplant I want her to see Infectious disease doctor harlan taveras or Danie Moody

## 2023-12-15 NOTE — TELEPHONE ENCOUNTER
Informed patient of note below and verbalized understanding ,advised if need referral to let our office know.

## 2023-12-21 ENCOUNTER — TELEPHONE (OUTPATIENT)
Dept: NEPHROLOGY | Facility: CLINIC | Age: 68
End: 2023-12-21

## 2023-12-21 NOTE — TELEPHONE ENCOUNTER
Patient calling states is needing the names of the 2 infectious disease doctors that she was referred to. Please call.

## 2023-12-21 NOTE — TELEPHONE ENCOUNTER
See TE 12-14. Gave name and numbers below. Verbalized understanding.      Dr Anand Blevins Born #[phone]

## 2023-12-22 ENCOUNTER — TELEPHONE (OUTPATIENT)
Facility: CLINIC | Age: 68
End: 2023-12-22

## 2023-12-22 NOTE — TELEPHONE ENCOUNTER
Spoke to pt. Advised to speak with pcp regarding gout as Dr. Darek Liang is the nephrologist. Verbalized understanding.

## 2023-12-26 PROCEDURE — 88305 TISSUE EXAM BY PATHOLOGIST: CPT | Performed by: INTERNAL MEDICINE

## 2023-12-26 PROCEDURE — 88312 SPECIAL STAINS GROUP 1: CPT | Performed by: INTERNAL MEDICINE

## 2023-12-29 ENCOUNTER — TELEPHONE (OUTPATIENT)
Facility: CLINIC | Age: 68
End: 2023-12-29

## 2023-12-29 NOTE — TELEPHONE ENCOUNTER
RN called and spoke to pt again. Pt asked if RN told insurance that the medication is being increased to twice a day from once daily dosing. RN assured pt that both insurance and pharmacy were notified of BID dosing (increased from once daily) and that it was still determined that med cannot be filled until 1/26/24 and that pt should have enough pantoprazole to take BID until that date. Pt verbalized understanding.

## 2023-12-29 NOTE — TELEPHONE ENCOUNTER
RN called and spoke to pt, informed her that I spoke to her insurance company and no PA is needed, it is just too soon to refill med. Informed pt that RN also called pharmacy and was told that med can be filled for BID on 1/26/24. Pt picked up pantoprazole 126 pills on 11/21. Instructed pt to take her current script BID; pt should not run out of med before 1/26/24. Pt verbalized understanding.

## 2023-12-29 NOTE — TELEPHONE ENCOUNTER
Dr. Das,   Please review path and give recs when able.  Thanks,  Brigida    Final Diagnosis:      Gastric ulcer; biopsy:   Fragments of gastric mucosa with glandular regenerative changes, lamina propria vascular ectasia, and mild inactive chronic inflammation.  Diff Quik stain (with appropriate control reactivity) is negative for H pylori microorganisms.

## 2024-01-03 NOTE — TELEPHONE ENCOUNTER
RN called and spoke to pt, informed her of MD review and recommendations r/t EGD pathology. Pt scheduled for clinic appt on 3/20/24. Date, time, and location verified with pt. Pt verbalized understanding.    Your Appointments        Wednesday March 20, 2024  8:00 AM  Follow Up Visit with Yrn Das MD  [address] [id number] [phone]

## 2024-01-03 NOTE — TELEPHONE ENCOUNTER
Tried calling Monday and Tuesday no answer  Biopsies negative for infection or precancerous changes  Follow up in office to discuss next steps

## 2024-01-08 ENCOUNTER — OFFICE VISIT (OUTPATIENT)
Facility: CLINIC | Age: 69
End: 2024-01-08

## 2024-01-08 VITALS
OXYGEN SATURATION: 96 % | WEIGHT: 172 LBS | SYSTOLIC BLOOD PRESSURE: 145 MMHG | BODY MASS INDEX: 29 KG/M2 | HEART RATE: 61 BPM | DIASTOLIC BLOOD PRESSURE: 75 MMHG

## 2024-01-08 DIAGNOSIS — I10 HYPERTENSION, UNSPECIFIED TYPE: ICD-10-CM

## 2024-01-08 DIAGNOSIS — F32.A DEPRESSION, UNSPECIFIED DEPRESSION TYPE: ICD-10-CM

## 2024-01-08 DIAGNOSIS — N18.30 ANEMIA DUE TO STAGE 3 CHRONIC KIDNEY DISEASE, UNSPECIFIED WHETHER STAGE 3A OR 3B CKD  (HCC): ICD-10-CM

## 2024-01-08 DIAGNOSIS — E78.5 DYSLIPIDEMIA: ICD-10-CM

## 2024-01-08 DIAGNOSIS — D63.1 ANEMIA DUE TO STAGE 3 CHRONIC KIDNEY DISEASE, UNSPECIFIED WHETHER STAGE 3A OR 3B CKD  (HCC): ICD-10-CM

## 2024-01-08 DIAGNOSIS — I50.32 CHRONIC DIASTOLIC CONGESTIVE HEART FAILURE (HCC): ICD-10-CM

## 2024-01-08 DIAGNOSIS — B25.9 CYTOMEGALOVIRUS (CMV) VIREMIA (HCC): ICD-10-CM

## 2024-01-08 DIAGNOSIS — M1A.9XX0 CHRONIC GOUT WITHOUT TOPHUS, UNSPECIFIED CAUSE, UNSPECIFIED SITE: ICD-10-CM

## 2024-01-08 DIAGNOSIS — N18.30 STAGE 3 CHRONIC KIDNEY DISEASE, UNSPECIFIED WHETHER STAGE 3A OR 3B CKD (HCC): Primary | ICD-10-CM

## 2024-01-08 DIAGNOSIS — Z48.22 ENCOUNTER FOR AFTERCARE FOLLOWING KIDNEY TRANSPLANT: ICD-10-CM

## 2024-01-08 PROCEDURE — 99214 OFFICE O/P EST MOD 30 MIN: CPT | Performed by: INTERNAL MEDICINE

## 2024-01-08 PROCEDURE — 3078F DIAST BP <80 MM HG: CPT | Performed by: INTERNAL MEDICINE

## 2024-01-08 PROCEDURE — 3077F SYST BP >= 140 MM HG: CPT | Performed by: INTERNAL MEDICINE

## 2024-01-08 RX ORDER — PRAVASTATIN SODIUM 20 MG
20 TABLET ORAL NIGHTLY
Qty: 30 TABLET | Refills: 3 | Status: SHIPPED | OUTPATIENT
Start: 2024-01-08

## 2024-01-08 NOTE — PROGRESS NOTES
Assessment & Plan     (I10) Hypertension, goal below 140/90  (primary encounter diagnosis)  Comment: Within guidelines using 2 drug therapy, ARB plus thiazide diuretic.  Very good renal function, trace microalbuminuria.  Plan: Basic metabolic panel, Albumin Random Urine         Quantitative with Creat Ratio, CBC with         platelets        For now, continue on current medication.  Please increase and thiazide diuretic to see if this helps with hand edema.  Long-term dose for his hydrochlorothiazide will still be 12.5 mg daily.    (F31.9) Bipolar affective disorder, remission status unspecified (H)  Comment: Doing well on current medications.  Followed by psychiatry.  Plan: Continue current medications and follow-up.    (R60.0) Hand edema  Comment: Etiology unclear no associated lower extremity edema.  Trace microalbuminuria would not explain edema.  Plan: hydrochlorothiazide (MICROZIDE) 12.5 MG capsule        In the short-term, will double the dose of his thiazide diuretic, advised to watch sodium in his diet and recheck in 1 month if there is no improvement.  Edema symmetrical, doubt thromboembolic event.    Results for orders placed or performed in visit on 04/21/21   Basic metabolic panel     Status: None   Result Value Ref Range    Sodium 140 133 - 144 mmol/L    Potassium 4.1 3.4 - 5.3 mmol/L    Chloride 108 94 - 109 mmol/L    Carbon Dioxide 27 20 - 32 mmol/L    Anion Gap 5 3 - 14 mmol/L    Glucose 97 70 - 99 mg/dL    Urea Nitrogen 14 7 - 30 mg/dL    Creatinine 0.99 0.66 - 1.25 mg/dL    GFR Estimate 85 >60 mL/min/[1.73_m2]    GFR Estimate If Black >90 >60 mL/min/[1.73_m2]    Calcium 9.1 8.5 - 10.1 mg/dL   Albumin Random Urine Quantitative with Creat Ratio     Status: Abnormal   Result Value Ref Range    Creatinine Urine 125 mg/dL    Albumin Urine mg/L 38 mg/L    Albumin Urine mg/g Cr 30.48 (H) 0 - 17 mg/g Cr   CBC with platelets     Status: None   Result Value Ref Range    WBC 6.6 4.0 - 11.0 10e9/L    R[facility]  [facility]    Progress Note     Mackenzie Nelson    69 y/o with remote h/o reflux nephropathy s/p ureteral implantation with resultant renal failure requiring preemptive LRKT 2002( from her sister,  six antigen match at [facility] ),  now with CKD, HLD, Anemia, depression , gout , G1 DD, s/p covid for 5 mth in 2022. s/p monoclonal AB infusion. previously seen by nephrologists but now established care at [facility].   Was in [place] and now back   she has no PCP- her  Dr Nelson manages most of her things. I have given names of PCP to her to establish care.'  after reviewing labs  in sept i had decreased her chlorthalidone to 12.5 mg/day. She is tolerating well, no edema and good BP    1/13/23: doing ok. feels fatigued and sleepy, thinks psych medication is contributing  7/18/23 had another episode of covid while travelling. Feeling v weak  10/30/23: here for fu. Had EGD/colonoscopy on 9/25/23- egd with gastric ulcer and colonoscopy unremarkable. Repeat egd planned. Colonoscopy in 10yrs  Feels fatigued and tired  1/8/23 new labs-sodium 134, k 3.5, cr 1.17 mg/dl, eGFR 51 ml/min  Since LOV had a passing out episode. Saw neurology. Scans been negative  Also had repeat egd- neg for precancerous and infection.  Also talked to ID for CMV and plan to monitor  Also saw dermatology     [facility]  h/o reflux nephropathy s/p ureteral implantation with resultant renal failure requiring preemptive LRKT 2002( from her sister,  six antigen match at [facility]     HISTORY:  Past Medical History:   Diagnosis Date    Anxiety state     Depression     Essential hypertension     Gout     High blood pressure     High cholesterol     Renal disorder     kidney transplant 21 years ago    Visual impairment       Past Surgical History:   Procedure Laterality Date    COLONOSCOPY N/A 09/25/2023    Procedure: COLONOSCOPY;  Surgeon: Yrn Das MD;  Location: [facility] ENDOSCOPY    HYSTERECTOMY      KIDNEY SURGERY      Kidney transplant            Medications (Active prior to today's visit):  Current Outpatient Medications   Medication Sig Dispense Refill    pravastatin 20 MG Oral Tab Take 1 tablet (20 mg total) by mouth nightly. 30 tablet 3    pantoprazole 40 MG Oral Tab EC Take 1 tablet (40 mg total) by mouth 2 (two) times daily before meals. 120 tablet 3    cyanocobalamin 1000 MCG/ML Injection Solution Inject 1 mL (1,000 mcg total) into the muscle every 30 (thirty) days.      escitalopram 10 MG Oral Tab Take 1 tablet (10 mg total) by mouth daily.      Ferrous Sulfate 325 (65 Fe) MG Oral Tab Take 1 tablet (325 mg total) by mouth daily with breakfast.      Hydroquinone 4 % External Cream Apply 1 Application topically 2 (two) times daily.      ZILXI 1.5 % External Foam Apply 1 Application topically daily.      magnesium oxide 400 MG Oral Tab Take 1 tablet (400 mg total) by mouth daily. (Patient taking differently: Take 0.5 tablets (200 mg total) by mouth. Every Monday,wed,Fri and Saturday) 30 tablet 3    Cholecalciferol (VITAMIN D3) 125 MCG (5000 UT) Oral Tablet Dispersible as directed Orally      allopurinol 100 MG Oral Tab Take 1 tablet (100 mg total) by mouth 2 (two) times daily.      losartan 100 MG Oral Tab Take 1 tablet (100 mg total) by mouth daily.      chlorthalidone 25 MG Oral Tab Take 0.5 tablets (12.5 mg total) by mouth daily.      metoprolol succinate ER 25 MG Oral Tablet 24 Hr Take 1 tablet (25 mg total) by mouth daily.      mycophenolate mofetil 250 MG Oral Cap Take 6 capsules (1,500 mg total) by mouth 2 (two) times daily before meals.      tacrolimus 1 MG Oral Cap Take 1 capsule (1 mg total) by mouth 2 (two) times daily. Take 4 tables morning  Take 3 tables evening      sertraline 50 MG Oral Tab Take 2 tablets (100 mg total) by mouth daily.      traZODone 50 MG Oral Tab Take 1 tablet (50 mg total) by mouth nightly.      acetaminophen 325 MG Oral Tab Take 1 tablet (325 mg total) by mouth every 6 (six) hours as needed for Pain.       "Count 5.37 4.4 - 5.9 10e12/L    Hemoglobin 16.0 13.3 - 17.7 g/dL    Hematocrit 46.6 40.0 - 53.0 %    MCV 87 78 - 100 fl    MCH 29.8 26.5 - 33.0 pg    MCHC 34.3 31.5 - 36.5 g/dL    RDW 12.8 10.0 - 15.0 %    Platelet Count 290 150 - 450 10e9/L        Patient Instructions   *   Not sure. Will check blood tests for kidney function and urine test for protein.     *   Will add another water pill, hydrochlorothiazide, as late in the day as possible. It can cause increased urination.     *   See if this helps.     *   Update me in one month.     *    Watch the salt and sodium in your diet.    [id number]}     BMI:   Estimated body mass index is 31.24 kg/m  as calculated from the following:    Height as of 9/25/20: 1.683 m (5' 6.25\").    Weight as of this encounter: 88.5 kg (195 lb).   Weight management plan: Discussed healthy diet and exercise guidelines      Return in about 1 month (around 5/21/2021) for hand edema.    Ashwini Jessica MD  [facility] ALVARADO Randhawa is a 57 year old who presents for the following health issues     HPI     Patient has been checking bp at home and reports an elevated reading of 156/105 with 94hr last Monday.    Concern - Swelling  Onset: At least 1 year   Description: Patient presents to discuss ongoing issues with his habds abd fingers. This has been an everyday occurrence within the last year where patient wakes up in the morning with swollen/sore hands. Throughout the day the selling goes down and it is at its worst in the morning but patient struggles to make a fist with his hands.   Intensity: mild to severe  Progression of Symptoms:  same and constant  Accompanying Signs & Symptoms: Patient notes feeling fatigued in the last 2 weeks, unsure if related.    Previous history of similar problem: Not before the last year   Therapies tried and outcome: None        Review of Systems   CONSTITUTIONAL: NEGATIVE for fever, chills, change in weight  ENT/MOUTH: NEGATIVE for " Beclomethasone Diprop HFA (QVAR REDIHALER) 80 MCG/ACT Inhalation Aerosol, Breath Activated 1 puff.         Allergies:  Allergies   Allergen Reactions    Dairy Products HIVES and DIARRHEA    Codeine NAUSEA AND VOMITING         ROS:     Constitutional:  Negative for decreased activity, fever, irritability and lethargy  ENMT:  Negative for ear drainage, hearing loss and nasal drainage  Eyes:  Negative for eye discharge and vision loss  Cardiovascular:  Negative for chest pain, sob  Respiratory:  Negative for cough, dyspnea and wheezing  Gastrointestinal:  Negative for abdominal pain, constipation  Genitourinary:  Negative for dysuria and hematuria  Endocrine:  Negative for abnormal sleep patterns  Hema/Lymph:  Negative for easy bleeding and easy bruising  Integumentary:  Negative for pruritus and rash  Musculoskeletal:  Negative for bone/joint symptoms  Neurological:  Negative for gait disturbance  Psychiatric:  Negative for inappropriate interaction and psychiatric symptoms      Vitals:    01/08/24 1300   BP: 145/75   Pulse: 61         PHYSICAL EXAM:   Constitutional: appears well hydrated alert and responsive   Head/Face: normocephalic  Eyes/Vision: normal extraocular motion is intact  Nose/Mouth/Throat:mucous membranes are moist   Neck/Thyroid: neck is supple without adenopathy  Respiratory:  lungs are clear to auscultation bilaterally  Cardiovascular: regular rate and rhythm   Abdomen: soft, non-tender, non-distended, BS normal  Skin/Hair: no unusual rashes present, no abnormal bruising noted  Musculoskeletal: no deformities  Extremities: no edema  Neurological:  Grossly normal      Lab Results   Component Value Date     (L) 12/05/2023     07/14/2023    K 3.5 12/05/2023    K 3.9 07/14/2023    CL 89 (L) 12/05/2023     07/14/2023    CO2 28 12/05/2023    CO2 26 07/14/2023    BUN 28 (H) 12/05/2023    BUN 33 (H) 07/14/2023    CREATSERUM 1.33 (H) 12/05/2023    CREATSERUM 1.31 (H) 07/14/2023    CA 9.6  ear, mouth and throat problems  RESP: NEGATIVE for significant cough or SOB  CV: NEGATIVE for chest pain, palpitations, notes bilateral upper extremity edema.  No lower extremity edema.  PSYCHIATRIC: NEGATIVE for changes in mood or affect      Objective    /84   Pulse 95   Temp 99.1  F (37.3  C) (Tympanic)   Wt 88.5 kg (195 lb)   SpO2 97%   BMI 31.24 kg/m    Body mass index is 31.24 kg/m .  Physical Exam   GENERAL: Healthy, alert and no distress  EYES: Eyes grossly normal to inspection, conjunctivae and sclerae normal  RESP: Lungs clear to auscultation - no rales, rhonchi or wheezes  CV: Regular rate and rhythm, normal S1 S2, no murmur no lower extremity edema noted, 1+ Edema noted over the dorsum of both hands, symmetrical.  MS: No gross musculoskeletal defects noted, no edema  NEURO: Normal strength and tone, mentation intact and speech normal  PSYCH: Mentation appears normal, affect normal/bright      Ashwini Jessica MD           12/05/2023    CA 9.8 07/14/2023    EGFRCR 44 (L) 12/05/2023    EGFRCR 45 (L) 07/14/2023    ALB 4.3 12/05/2023    ALB 4.1 07/14/2023 2014-2019 labs reviewed cr 1.2-1.5 mg/dl  sept and dec 2021- cr 1.4mg/dl, eGFR 38 ml/min  5/2022 -1.7 mg/dl   CMV/BKV 2021 negative  [place] UA 2021  tac level 6 in 2021  May 2022 labs cr 1.7 mg/dl , BUn 41,  eGFR  30 ml/min, hgb 10.3  11/8/2022: cr 1.5 mg/dl, eGFR 37 ml/min, all electrolytes ok, ldl 102, ipth 77, UPC negative, hgb 10.4, bland UA. low iron sat, vit b 12 ok, vit d ok, tac 6.6  7/18/2023 cr 1.3 mg/dl, eGFR 45 ml/min , k 3.9, mag 1.4, phosp 4.4, TSH 5.0 free t 4 1.1 (normal), hgb 11.1,iron saturation 12 %, ferritin 7, tacrolimus 5.3  , ,    All labs at [facility]     ASSESSMENT/PLAN:   Assessment   1. Stage 3 chronic kidney disease, unspecified whether stage 3a or 3b CKD (HCC)  - pravastatin 20 MG Oral Tab; Take 1 tablet (20 mg total) by mouth nightly.  Dispense: 30 tablet; Refill: 3  - Renal Function Panel; Future  - CBC With Differential With Platelet; Future  - Urinalysis with Culture Reflex; Future  - Protein/Creatinine Ratio, Urine Random; Future  - Magnesium; Future  - Tacrolimus (Immunoassay); Future  - BK virus quantitative by PCR; Future  - Cmv Quant Dna Pcr (Plasma); Future  - Liver Function Panel (7); Future  - Lipid Panel; Future  - Cardio Referral - External    2. Encounter for aftercare following kidney transplant  - pravastatin 20 MG Oral Tab; Take 1 tablet (20 mg total) by mouth nightly.  Dispense: 30 tablet; Refill: 3  - Renal Function Panel; Future  - CBC With Differential With Platelet; Future  - Urinalysis with Culture Reflex; Future  - Protein/Creatinine Ratio, Urine Random; Future  - Magnesium; Future  - Tacrolimus (Immunoassay); Future  - BK virus quantitative by PCR; Future  - Cmv Quant Dna Pcr (Plasma); Future  - Liver Function Panel (7); Future  - Lipid Panel; Future    3. Hypertension, unspecified type    4. Anemia due to stage 3  chronic kidney disease, unspecified whether stage 3a or 3b CKD  (HCC)    5. Chronic diastolic congestive heart failure (HCC)    6. Dyslipidemia    7. Chronic gout without tophus, unspecified cause, unspecified site    8. Depression, unspecified depression type    9. 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 On chlorthalidone      H/o kidney transplant  s/p LRKT in 2002 - no post op complications, no rejections  on tacrolimus 4 mg in am and 3 mg in pm and cellcept 500 mg bid  tac levels 4-6 range    HTN  target BP < 140/90  on metoprolol, losartan and chlorthalidone (1/2 tablet)  + low salt diet  well controlled  Will not inc bc of recent syncopal episode , wants cardiology evaluation    Anemia  iron def but cannot take PO iron  S/p EGD and colonoscopy with Dr Das (9/25/23) - gastric ulcer. On ppi and repeat EGD. Colonoscopy negative  vitamin b12 and FA acceptable  Low iron stores (sats 12 and ferritin 7) - s/p 5 doses iv iron  Hgb better    CHF  Grade 1 DD- follows with DR Melinda Lucas at [facility].  Compensated   On BB and diuretic    Syncope  Cardiology referral  Need EKG and echo      Hyperlipidemia  elevated FLP, have made dietary modification  not on any statin  LDL worse again--> start low dose pravastatin    Gout   On allopurinol    Depression  Follows up with Dr Madiha Shabbir  needs therapy for 6 weeks- getting it done    Health maintain  she needs a pcp   recommend getting mammogram,  PAP. -has been losing weight as well  Saw derm for skin  checkup    CMV viremia  Low levels  She did talk to ID and plan is to monitor      PLAN  Start low dose pravastatin  Recheck labs + lfts in 2 mths  Cardiology consult  Needs PCP ( [facility])  Labs and fu in 3mth         Orders This Visit:  Orders Placed This Encounter   Procedures    Renal Function Panel    CBC With Differential With Platelet    Urinalysis with Culture Reflex    Protein/Creatinine Ratio, Urine Random    Magnesium    Tacrolimus (Immunoassay)    BK virus quantitative by PCR    Cmv Quant Dna Pcr (Plasma)    Liver Function Panel (7)    Lipid Panel       Meds This Visit:  Requested Prescriptions     Signed Prescriptions Disp Refills    pravastatin 20 MG Oral Tab 30 tablet 3     Sig: Take 1 tablet (20 mg total) by mouth nightly.       Imaging & Referrals:  CARDIO - EXTERNAL       Debbie Falcon MD  1/8/2024

## 2024-01-11 ENCOUNTER — MED REC SCAN ONLY (OUTPATIENT)
Facility: CLINIC | Age: 69
End: 2024-01-11

## 2024-01-12 ENCOUNTER — MED REC SCAN ONLY (OUTPATIENT)
Dept: NEPHROLOGY | Facility: CLINIC | Age: 69
End: 2024-01-12

## 2024-01-18 ENCOUNTER — APPOINTMENT (OUTPATIENT)
Dept: CARDIOLOGY | Age: 69
End: 2024-01-18

## 2024-01-18 VITALS
HEIGHT: 65 IN | HEART RATE: 77 BPM | OXYGEN SATURATION: 99 % | DIASTOLIC BLOOD PRESSURE: 72 MMHG | WEIGHT: 172.07 LBS | SYSTOLIC BLOOD PRESSURE: 130 MMHG | BODY MASS INDEX: 28.67 KG/M2

## 2024-01-18 DIAGNOSIS — Z94.0 HISTORY OF RENAL TRANSPLANT: ICD-10-CM

## 2024-01-18 DIAGNOSIS — R55 SYNCOPE AND COLLAPSE: ICD-10-CM

## 2024-01-18 DIAGNOSIS — R00.2 PALPITATIONS: ICD-10-CM

## 2024-01-18 DIAGNOSIS — I10 PRIMARY HYPERTENSION: Primary | ICD-10-CM

## 2024-01-18 PROCEDURE — 3075F SYST BP GE 130 - 139MM HG: CPT | Performed by: SPECIALIST

## 2024-01-18 PROCEDURE — 3078F DIAST BP <80 MM HG: CPT | Performed by: SPECIALIST

## 2024-01-18 PROCEDURE — 99214 OFFICE O/P EST MOD 30 MIN: CPT | Performed by: SPECIALIST

## 2024-01-18 RX ORDER — TRAZODONE HYDROCHLORIDE 50 MG/1
50 TABLET ORAL
COMMUNITY

## 2024-01-18 RX ORDER — ROSUVASTATIN CALCIUM 10 MG/1
10 TABLET, COATED ORAL DAILY
Qty: 90 TABLET | Refills: 3 | Status: SHIPPED | OUTPATIENT
Start: 2024-01-18

## 2024-01-18 RX ORDER — SERTRALINE HYDROCHLORIDE 100 MG/1
100 TABLET, FILM COATED ORAL DAILY
COMMUNITY
Start: 2023-11-19

## 2024-01-18 SDOH — HEALTH STABILITY: PHYSICAL HEALTH: ON AVERAGE, HOW MANY DAYS PER WEEK DO YOU ENGAGE IN MODERATE TO STRENUOUS EXERCISE (LIKE A BRISK WALK)?: 0 DAYS

## 2024-01-18 SDOH — HEALTH STABILITY: PHYSICAL HEALTH: ON AVERAGE, HOW MANY MINUTES DO YOU ENGAGE IN EXERCISE AT THIS LEVEL?: 0 MIN

## 2024-01-18 ASSESSMENT — PATIENT HEALTH QUESTIONNAIRE - PHQ9
1. LITTLE INTEREST OR PLEASURE IN DOING THINGS: NOT AT ALL
SUM OF ALL RESPONSES TO PHQ9 QUESTIONS 1 AND 2: 0
CLINICAL INTERPRETATION OF PHQ2 SCORE: NO FURTHER SCREENING NEEDED
SUM OF ALL RESPONSES TO PHQ9 QUESTIONS 1 AND 2: 0
2. FEELING DOWN, DEPRESSED OR HOPELESS: NOT AT ALL

## 2024-01-19 ENCOUNTER — ANCILLARY PROCEDURE (OUTPATIENT)
Dept: CARDIOLOGY | Age: 69
End: 2024-01-19
Attending: SPECIALIST

## 2024-01-19 ENCOUNTER — TELEPHONE (OUTPATIENT)
Dept: NEPHROLOGY | Facility: CLINIC | Age: 69
End: 2024-01-19

## 2024-01-19 DIAGNOSIS — I10 PRIMARY HYPERTENSION: ICD-10-CM

## 2024-01-19 DIAGNOSIS — R00.2 PALPITATIONS: ICD-10-CM

## 2024-01-19 DIAGNOSIS — R55 SYNCOPE AND COLLAPSE: ICD-10-CM

## 2024-01-19 DIAGNOSIS — Z94.0 HISTORY OF RENAL TRANSPLANT: ICD-10-CM

## 2024-01-19 LAB
AORTIC VALVE AREA (AVA): 0.78
ASCENDING AORTA (AAD): 3
AV PEAK GRADIENT (AVPG): 9
AV PEAK VELOCITY (AVPV): 1.52
AV STENOSIS SEVERITY TEXT: NORMAL
AVI LVOT PEAK GRADIENT (LVOTMG): 0.9
E WAVE DECELARATION TIME (MDT): 7.97
INTERVENTRICULAR SEPTUM IN END DIASTOLE (IVSD): 2.91
LEFT INTERNAL DIMENSION IN SYSTOLE (LVSD): 0.8
LEFT VENTRICULAR INTERNAL DIMENSION IN DIASTOLE (LVDD): 2.7
LEFT VENTRICULAR POSTERIOR WALL IN END DIASTOLE (LVPW): 4.2
LV EF: NORMAL %
LVOT VTI (LVOTVTI): 0.98
MV E TISSUE VEL MED (MESV): 11.6
MV E WAVE VEL/E TISSUE VEL MED(MSR): 9.75
MV PEAK A VELOCITY (MVPAV): 227
MV PEAK E VELOCITY (MVPEV): 0.79
RV END SYSTOLIC LONGITUDINAL STRAIN FREE WALL (RVGS): 1.5
TRICUSPID VALVE ANNULAR PEAK VELOCITY (TVAPV): 22
TRICUSPID VALVE PEAK REGURGITATION VELOCITY (TRPV): 2.4
TV ESTIMATED RIGHT ARTERIAL PRESSURE (RAP): 10.3

## 2024-01-19 PROCEDURE — 76376 3D RENDER W/INTRP POSTPROCES: CPT | Performed by: SPECIALIST

## 2024-01-19 PROCEDURE — 93306 TTE W/DOPPLER COMPLETE: CPT | Performed by: SPECIALIST

## 2024-01-19 NOTE — TELEPHONE ENCOUNTER
Rn spoke to patient and advised to call patient PCP to Rx Paxlovid with the dosage informed pt there is only 2 kind one for pt with kidney issues,the PCP should know it and so with pharmacist,pt understood.Advised to rest and keep hydrated.

## 2024-01-19 NOTE — TELEPHONE ENCOUNTER
Dr. Chu, routing to you pt of Dr. Falcon's (on vacation till Monday).     Spoke to pt. States her dtr has covid and lives with her. States now she feels feverish but has not checked her temp. Coughing up yellow phlem. Has not tested for covid. Is concerned d/t being transplant pt and it being the weekend. Is wondering if you will prescribe paxlovid. Advised I wasn't sure since that med is typically prescribed by a pcp and also because pt does not have a positive test and may not need to take.     Routing to Dr. Falcon as well.

## 2024-01-19 NOTE — TELEPHONE ENCOUNTER
Patient asking if rx Paxlovoid can be prescribed, based on patient not feeling good. Please call at [phone],thanks.

## 2024-01-19 NOTE — TELEPHONE ENCOUNTER
Christiano Chu MD Parham, Courtney, RN  Caller: Unspecified (Today, 12:06 PM)  Needs to get from PCP

## 2024-01-19 NOTE — TELEPHONE ENCOUNTER
Lmtcb. If pt returns call, please advise will need to reach out to pcp or an urgent care regarding script for paxlovid per MD. Thank you.

## 2024-01-22 ENCOUNTER — TELEPHONE (OUTPATIENT)
Dept: CARDIOLOGY | Age: 69
End: 2024-01-22

## 2024-02-06 ENCOUNTER — APPOINTMENT (OUTPATIENT)
Dept: URBAN - METROPOLITAN AREA CLINIC 248 | Age: 69
Setting detail: DERMATOLOGY
End: 2024-02-06

## 2024-02-06 DIAGNOSIS — D49.2 NEOPLASM OF UNSPECIFIED BEHAVIOR OF BONE, SOFT TISSUE, AND SKIN: ICD-10-CM

## 2024-02-06 DIAGNOSIS — L57.0 ACTINIC KERATOSIS: ICD-10-CM

## 2024-02-06 PROCEDURE — OTHER BIOPSY BY SHAVE METHOD: OTHER

## 2024-02-06 PROCEDURE — 11102 TANGNTL BX SKIN SINGLE LES: CPT

## 2024-02-06 PROCEDURE — OTHER COUNSELING: OTHER

## 2024-02-06 PROCEDURE — 17000 DESTRUCT PREMALG LESION: CPT | Mod: 59

## 2024-02-06 PROCEDURE — OTHER LIQUID NITROGEN: OTHER

## 2024-02-06 PROCEDURE — 17003 DESTRUCT PREMALG LES 2-14: CPT

## 2024-02-06 ASSESSMENT — LOCATION ZONE DERM: LOCATION ZONE: FACE

## 2024-02-06 ASSESSMENT — LOCATION SIMPLE DESCRIPTION DERM
LOCATION SIMPLE: RIGHT CHEEK
LOCATION SIMPLE: LEFT EYEBROW
LOCATION SIMPLE: RIGHT FOREHEAD

## 2024-02-06 ASSESSMENT — LOCATION DETAILED DESCRIPTION DERM
LOCATION DETAILED: LEFT CENTRAL EYEBROW
LOCATION DETAILED: RIGHT SUPERIOR FOREHEAD
LOCATION DETAILED: RIGHT INFERIOR CENTRAL MALAR CHEEK
LOCATION DETAILED: RIGHT SUPERIOR MEDIAL BUCCAL CHEEK

## 2024-02-06 NOTE — PROCEDURE: BIOPSY BY SHAVE METHOD
Detail Level: Detailed
Depth Of Biopsy: dermis
Was A Bandage Applied: Yes
Size Of Lesion In Cm: 0
Biopsy Type: H and E
Biopsy Method: Dermablade
Anesthesia Type: 1% lidocaine with epinephrine
Anesthesia Volume In Cc: 0.5
Hemostasis: Drysol
Wound Care: Petrolatum
Dressing: bandage
Destruction After The Procedure: No
Type Of Destruction Used: Curettage
Curettage Text: The wound bed was treated with curettage after the biopsy was performed.
Cryotherapy Text: The wound bed was treated with cryotherapy after the biopsy was performed.
Electrodesiccation Text: The wound bed was treated with electrodesiccation after the biopsy was performed.
Electrodesiccation And Curettage Text: The wound bed was treated with electrodesiccation and curettage after the biopsy was performed.
Silver Nitrate Text: The wound bed was treated with silver nitrate after the biopsy was performed.
Lab: -[id number]
Consent: Written consent was obtained and risks were reviewed including but not limited to scarring, infection, bleeding, scabbing, incomplete removal, nerve damage and allergy to anesthesia.
Post-Care Instructions: I reviewed with the patient in detail post-care instructions. Patient is to keep the biopsy site dry overnight, and then apply bacitracin twice daily until healed. Patient may apply hydrogen peroxide soaks to remove any crusting.
Notification Instructions: Patient will be notified of biopsy results. However, patient instructed to call the office if not contacted within 2 weeks.
Billing Type: Third-Party Bill
Information: Selecting Yes will display possible errors in your note based on the variables you have selected. This validation is only offered as a suggestion for you. PLEASE NOTE THAT THE VALIDATION TEXT WILL BE REMOVED WHEN YOU FINALIZE YOUR NOTE. IF YOU WANT TO FAX A PRELIMINARY NOTE YOU WILL NEED TO TOGGLE THIS TO 'NO' IF YOU DO NOT WANT IT IN YOUR FAXED NOTE.

## 2024-03-20 ENCOUNTER — TELEPHONE (OUTPATIENT)
Dept: GASTROENTEROLOGY | Facility: CLINIC | Age: 69
End: 2024-03-20

## 2024-03-20 ENCOUNTER — OFFICE VISIT (OUTPATIENT)
Dept: GASTROENTEROLOGY | Facility: CLINIC | Age: 69
End: 2024-03-20

## 2024-03-20 VITALS
BODY MASS INDEX: 28.29 KG/M2 | WEIGHT: 169.81 LBS | HEIGHT: 65 IN | HEART RATE: 57 BPM | DIASTOLIC BLOOD PRESSURE: 76 MMHG | SYSTOLIC BLOOD PRESSURE: 130 MMHG

## 2024-03-20 DIAGNOSIS — K29.71 GASTRITIS WITH HEMORRHAGE, UNSPECIFIED CHRONICITY, UNSPECIFIED GASTRITIS TYPE: ICD-10-CM

## 2024-03-20 DIAGNOSIS — K25.7 CHRONIC GASTRIC ULCER, UNSPECIFIED WHETHER GASTRIC ULCER HEMORRHAGE OR PERFORATION PRESENT: ICD-10-CM

## 2024-03-20 DIAGNOSIS — R19.7 DIARRHEA OF PRESUMED INFECTIOUS ORIGIN: Primary | ICD-10-CM

## 2024-03-20 DIAGNOSIS — K58.0 IRRITABLE BOWEL SYNDROME WITH DIARRHEA: ICD-10-CM

## 2024-03-20 DIAGNOSIS — R10.9 ABDOMINAL PAIN, UNSPECIFIED ABDOMINAL LOCATION: ICD-10-CM

## 2024-03-20 DIAGNOSIS — K25.7 CHRONIC GASTRIC ULCER, UNSPECIFIED WHETHER GASTRIC ULCER HEMORRHAGE OR PERFORATION PRESENT: Primary | ICD-10-CM

## 2024-03-20 PROCEDURE — 3075F SYST BP GE 130 - 139MM HG: CPT | Performed by: INTERNAL MEDICINE

## 2024-03-20 PROCEDURE — 3078F DIAST BP <80 MM HG: CPT | Performed by: INTERNAL MEDICINE

## 2024-03-20 PROCEDURE — 3008F BODY MASS INDEX DOCD: CPT | Performed by: INTERNAL MEDICINE

## 2024-03-20 PROCEDURE — 99214 OFFICE O/P EST MOD 30 MIN: CPT | Performed by: INTERNAL MEDICINE

## 2024-03-20 RX ORDER — METOPROLOL SUCCINATE 25 MG/1
25 TABLET, EXTENDED RELEASE ORAL EVERY EVENING
COMMUNITY

## 2024-03-20 RX ORDER — COLCHICINE 0.6 MG/1
0.6 TABLET ORAL DAILY
COMMUNITY
Start: 2023-12-22

## 2024-03-20 RX ORDER — TIZANIDINE 4 MG/1
4 TABLET ORAL 2 TIMES DAILY PRN
COMMUNITY
Start: 2023-11-29

## 2024-03-20 RX ORDER — ROSUVASTATIN CALCIUM 10 MG/1
10 TABLET, COATED ORAL DAILY
COMMUNITY
Start: 2024-01-18

## 2024-03-20 RX ORDER — SERTRALINE HYDROCHLORIDE 100 MG/1
100 TABLET, FILM COATED ORAL DAILY
COMMUNITY
Start: 2024-03-02

## 2024-03-20 RX ORDER — TACROLIMUS 1 MG/1
4 CAPSULE ORAL EVERY MORNING
COMMUNITY

## 2024-03-20 RX ORDER — TAZAROTENE 1 MG/G
1 CREAM TOPICAL NIGHTLY
COMMUNITY

## 2024-03-20 RX ORDER — ASPIRIN 81 MG/1
1 TABLET, CHEWABLE ORAL DAILY
COMMUNITY

## 2024-03-20 NOTE — TELEPHONE ENCOUNTER
Scheduled for:  EGD [id number]  Provider Name:  Dr. Das  Date:  6/20/24  Location:[facility]  Sedation:  MAC  Time: 0730 Am (Patient is aware arrival time is at 0630 Am)  Prep:  Egd- Npo after Midnight   Meds/Allergies Reconciled?:  Physician Reviewed   Diagnosis with codes: Gastric Ulcer K25.7, Abdominal pain R10.9  Was patient informed to call insurance with codes (Y/N):  Yes  Referral sent?:  Referral was sent at the time of electronic surgical scheduling.  EM or [facility] notified?:  I sent an electronic request to Endo Scheduling and received a confirmation today.  Medication Orders:  Pt is aware to NOT take iron pills, herbal meds and diet supplements for 7 days before exam. Also to NOT take any form of alcohol, recreational drugs and any forms of ED meds 24 hours before exam.   Misc Orders:  Patient was informed about the new cancellation policy for his/her procedure. Patient was also given a copy of the cancellation policy at the time of the appointment and verbalized understanding.      Further instructions given by staff:  I provide prep instructions to patient at the time of the appointment and reviewed date, time and location, patient verbalized that she understood and is aware to call if she has any questions.

## 2024-03-20 NOTE — PROGRESS NOTES
[facility] - Gastroenterology  Clinic Follow-up Visit    Chief Complaint   Patient presents with    Follow - Up     S/p colon /egd from 12/26/23 ,still having heartburn and upset stomach         Subjective/HPI:   Mackenzie Nelson is a 69 year old female, patient of Dr. Dr. Nelson with history of reflux nephropathy s/p ureteral implantation with resultant renal failure requiring preemptive LRKT 2002( from her sister,  six antigen match at [facility] ),  now with CKD, HLD, Anemia, depression , gout , G1 DD, s/p covid for 5 mth in 2022. s/p monoclonal AB infusion who presents for ANAHI and follow up for EGD/colonoscopy      Past Visit(s):  Saw Alley Sierra  Mackenzie Nelson is a 68 year old year-old female with history of h/o reflux nephropathy s/p ureteral implantation with resultant renal failure requiring preemptive LRKT 2002( from her sister,  six antigen match at [facility] ),  now with CKD, HLD, Anemia, depression , gout , G1 DD, s/p covid for 5 mth in 2022. s/p monoclonal AB infusion who presents for evaluation of ANAHI.     Patient has completed 5 days of IV venofer.      GERD- She is on protonix. She will get epigastric pain that is very uncomfortable. She often is nausea daily. Can have episodes of vomiting, but they do not occur daily. Denies hematemesis. Denies dysphagia, globus, odynophagia.         Diarrhea- She has had stool testing done that is negative. She will have diarrhea about 10 mins after eating anything. She can have about 5 bowel movements per day. Nausea with food. No brbpr or melena. Feeling as though her stomach does bloat after eating. She does complain of LLQ abdominal pain. Often is sharp and shooting and is associated with diarrhea. Denies fevers, chills and sick contacts. Patient is immunocompromised.      NSAIDS: none  Tobacco: none  Alcohol: none  Marijuana: none  Illicit drugs: none     FH GI malignancy- none  FH celiac dz- none  FH liver dz- none  FH IBD- none     No history of adverse reaction to  sedation  No CINDY  No anticoagulants  No pacemaker/defibrillator  No pain medications and/or sleep aides       =======================================================================================================  11/8/2023, pt presents for f/u.  Did omeprazole for 1 week and now protonix once a day prior to eating    Overall, the patient feels better.     Does not feel like energy is better  Still having bloating  GI symptoms of abdominal pain, nausea, vomiting, change in bowel habits, dyspepsia, dysphagia, hematemesis, hematochezia, or melena. Patient has a bowel movement each day to every other day. Additionally there is no change of appetite, unexpected weight loss, and no reported history of chest pain or shortness of breath.      ==================================================================================================  03/20/24, presents for follow up    Went to [place] for 3 weeks and had bad stomach pain after a little chili   Also had diarrhea possible travelers diarrhea  Now improving but with some food still diarrhea  Mild stomach pain still  + nausea  GI symptoms of vomiting. + irregular bowel habits since infection.   Does get some dyspepsia  Denies dysphagia, hematemesis, hematochezia, or melena.   Patient has a bowel movement each day to every other day.   Additionally there is no change of appetite, unexpected weight loss, and no reported history of chest pain or shortness of breath.    ================================================    Pertinent Surgical Hx:  - See additional surgical hx below  - No known issues with sedation.  - No known history of sleep apnea.    Prior endoscopies:  EGD/colonoscopy 9/25/2023  EGD findings:       1. Esophagus: The squamocolumnar junction was noted at 37 cm and appeared regular with a few island of salmon mucosa biopsied to rule out moody's. The GE junction was noted at 37 cm from the incisors. No significant hiatal hernia. The esophageal mucosa  appeared normal. There was no evidence of esophagitis, stricture  2. Stomach: The stomach distended normally. Normal rugal folds were seen. The pylorus was patent. The gastric mucosa appeared erythematous with erosions and one 6 mm clean based ulcer in the antrum. Biopsies taken to rule out Hpylori Retroflexion revealed a normal fundus and a normal cardia.   3. Duodenum: The duodenal mucosa appeared normal in the 1st and 2nd portion of the duodenum. Biopsied to rule out celiac     Colonoscopy findings:     1. MONICA: normal rectal tone, no masses palpated.   2. No polyp(s) noted   3. Terminal ileum: the visualized mucosa appeared normal.  4. The colonic mucosa throughout the colon showed normal vascular pattern, without evidence of angioectasias or inflammation.   5. Diverticulosis: left sided.  6. A retroflexed view of the rectum revealed hemorrhoids.        Recommend:  Repeat colonoscopy in 10 years pending pathology.  If new signs or symptoms develop, colonoscopy may need to be repeated sooner.   High fiber diet.  Monitor for blood in the stool. If having more than just tinge of blood, call office or go to the ER.  Omeprazole or pantoprazole 40 mg daily before breakfast  Avoid NSAIDs as possible     >>>If tissue was obtained and you have not received your pathology results either by phone or letter within 2 weeks, please call our office at [phone].     Specimens: gastric biopsies, duodenal biopsies, distal esophageal biopsies  Final Diagnosis:      A. Duodenum; biopsy:  Duodenal mucosa with preserved villous architecture, lymphoid aggregates and features suggestive of chronic peptic duodenitis.     B. Gastric erosions; biopsy:  Chronic inactive gastritis with focal foveolar hyperplasia and reactive atypia.  Negative for intestinal metaplasia or dysplasia.  Diff-Quik stain (with appropriate control reactivity) is negative for H. pylori microorganisms.      C. Distal esophagus; biopsy:  Squamocolumnar mucosa with  mild chronic inflammation, capillaries congestion, and focal basal cell hyperplasia, see comment.  No definite evidence of intestinal metaplasia or dysplasia identified.     Comment:  C) The histological findings are most compatible with reflux disease in the right clinical setting.      Repeat EGD 12/26/2023  Non--healing ulcer   Gastric ulcer; biopsy:   Fragments of gastric mucosa with glandular regenerative changes, lamina propria vascular ectasia, and mild inactive chronic inflammation.  Diff Quik stain (with appropriate control reactivity) is negative for H pylori microorganisms.    Wt Readings from Last 10 Encounters:   03/20/24 169 lb 12.8 oz (77 kg)   01/08/24 172 lb (78 kg)   12/20/23 160 lb (72.6 kg)   11/08/23 172 lb 12.8 oz (78.4 kg)   10/30/23 165 lb (74.8 kg)   09/25/23 165 lb (74.8 kg)   09/19/23 168 lb (76.2 kg)   07/18/23 169 lb (76.7 kg)       Social Hx:  - No smoking/etoh  - Denies illicit drug use   - NSAIDs/ASA use: PRN      History, Medications, Allergies, ROS:      Past Medical History:   Diagnosis Date    Anxiety state     Depression     Essential hypertension     Gout     High blood pressure     High cholesterol     Renal disorder     kidney transplant 21 years ago    Visual impairment       Past Surgical History:   Procedure Laterality Date    COLONOSCOPY N/A 09/25/2023    Procedure: COLONOSCOPY;  Surgeon: Yrn Das MD;  Location: [facility] ENDOSCOPY    HYSTERECTOMY      KIDNEY SURGERY      Kidney transplant      Family History   Problem Relation Age of Onset    Diabetes Mother     Hypertension Mother     Other (Other) Brother       Social History:   Social History     Socioeconomic History    Marital status:    Tobacco Use    Smoking status: Never    Smokeless tobacco: Never   Vaping Use    Vaping Use: Never used   Substance and Sexual Activity    Alcohol use: Not Currently    Drug use: Never        Medications (Active prior to today's visit):  Current Outpatient Medications    Medication Sig Dispense Refill    aspirin 81 MG Oral Chew Tab Chew 1 tablet (81 mg total) by mouth daily.      colchicine 0.6 MG Oral Tab Take 1 tablet (0.6 mg total) by mouth daily.      tiZANidine 4 MG Oral Tab Take 1 tablet (4 mg total) by mouth 2 (two) times daily as needed.      rosuvastatin 10 MG Oral Tab Take 1 tablet (10 mg total) by mouth daily.      sertraline 100 MG Oral Tab Take 1 tablet (100 mg total) by mouth daily.      pravastatin 20 MG Oral Tab Take 1 tablet (20 mg total) by mouth nightly. 30 tablet 3    pantoprazole 40 MG Oral Tab EC Take 1 tablet (40 mg total) by mouth 2 (two) times daily before meals. 120 tablet 3    cyanocobalamin 1000 MCG/ML Injection Solution Inject 1 mL (1,000 mcg total) into the muscle every 30 (thirty) days.      escitalopram 10 MG Oral Tab Take 1 tablet (10 mg total) by mouth daily.      Beclomethasone Diprop HFA (QVAR REDIHALER) 80 MCG/ACT Inhalation Aerosol, Breath Activated 1 puff.      Cholecalciferol (VITAMIN D3) 125 MCG (5000 UT) Oral Tablet Dispersible as directed Orally      allopurinol 100 MG Oral Tab Take 1 tablet (100 mg total) by mouth 2 (two) times daily.      losartan 100 MG Oral Tab Take 1 tablet (100 mg total) by mouth daily.      chlorthalidone 25 MG Oral Tab Take 0.5 tablets (12.5 mg total) by mouth daily.      metoprolol succinate ER 25 MG Oral Tablet 24 Hr Take 1 tablet (25 mg total) by mouth daily.      mycophenolate mofetil 250 MG Oral Cap Take 6 capsules (1,500 mg total) by mouth 2 (two) times daily before meals.      tacrolimus 1 MG Oral Cap Take 1 capsule (1 mg total) by mouth 2 (two) times daily. Take 4 tables morning  Take 3 tables evening      sertraline 50 MG Oral Tab Take 2 tablets (100 mg total) by mouth daily.      traZODone 50 MG Oral Tab Take 1 tablet (50 mg total) by mouth nightly.      acetaminophen 325 MG Oral Tab Take 1 tablet (325 mg total) by mouth every 6 (six) hours as needed for Pain.      Tazarotene 0.1 % External Cream Apply 1  Application topically nightly.      metoprolol succinate ER 25 MG Oral Tablet 24 Hr Take 1 tablet (25 mg total) by mouth every evening.      tacrolimus 1 MG Oral Cap Take 4 capsules (4 mg total) by mouth every morning.      Ferrous Sulfate 325 (65 Fe) MG Oral Tab Take 1 tablet (325 mg total) by mouth daily with breakfast. (Patient not taking: Reported on 3/20/2024)      Hydroquinone 4 % External Cream Apply 1 Application topically 2 (two) times daily. (Patient not taking: Reported on 3/20/2024)      ZILXI 1.5 % External Foam Apply 1 Application topically daily.      magnesium oxide 400 MG Oral Tab Take 1 tablet (400 mg total) by mouth daily. (Patient not taking: Reported on 3/20/2024) 30 tablet 3       Allergies:  Allergies   Allergen Reactions    Dairy Products HIVES and DIARRHEA    Lactase DIARRHEA, RASH and HIVES    Codeine NAUSEA AND VOMITING       ROS:   CONSTITUTIONAL:  negative for fevers, chills  EYES:  negative for change in vision  RESPIRATORY:  negative for  shortness of breath  CARDIOVASCULAR:  negative for  chest pain  GASTROINTESTINAL:  see HPI  GENITOURINARY:  negative for dysuria and hematuria   SKIN:  negative for  rash  ALLERGIC/IMMUNOLOGIC:  negative for hay fever allergies  ENDOCRINE:  negative for cold intolerance and heat intolerance  MUSCULOSKELETAL:  negative for  joint stiffness and joint swelling  BEHAVIOR/PSYCH:  negative for depressed mood    PHYSICAL EXAM:   Blood pressure 130/76, pulse 57, height 5' 5\" (1.651 m), weight 169 lb 12.8 oz (77 kg).    Gen: patient appears comfortable and in no acute distress  HEENT: conjunctiva pink, the sclera appears anicteric, oropharynx clear, mucus membranes appear moist  CV: regular rate and rhythm, the extremities are warm and well perfused   Lung: effort normal and breath sounds normal, no respiratory distress, wheezes or rales  GI: soft, non-tender exam in all quadrants without rigidity or guarding, non-distended, no abnormal bowel sounds noted, no  masses are palpated  : no CVA tenderness  Skin: dry, warm, no jaundice  Ext: no cyanosis, clubbing or edema is evident.   Neuro: Alert and oriented x4, and patient is having movements of all 4 extremities   Psych: Pt has a normal mood and affect, behavior is normal    Nursing note and vitals reviewed      Labs/Imaging:     Patient's labs and imaging were reviewed and discussed with patient today.  See HPI and A&P for further details.     .  ASSESSMENT/PLAN:     1. Gastric ulcer  2. Bloating   3. Urgency but more formed. Improved after colonoscopy      Recommend:  Metamucil daily and magnesium ever other day  Pantoprazole daily before breakfast and at night as needed  Here are some reflux precautions:   - Avoid trigger foods  - Anti-reflux measures: raising the head of the bed at night, avoiding tight clothing or belts, avoiding eating late at night and not lying down shortly after mealtime and achieving weight loss   - Avoid NSAID's, caffeine, peppermints, alcohol, tobacco and foods that incite symptoms   Add more activity  Repeat EGD  -Schedule EGD w/ possible biopsy/dilation w/MAC for gastric ulcer and pain    ** If MAC @ [facility]/NE:    - HOLD ACE/ARBs the night before and/or the day of the procedure(s) - N/A   - NO alcohol, recreational drugs nor erectile dysfunction mediations 24 hours before procedure(s)   - NO herbal supplements or weight loss medications x 7 days prior to the procedure(s)    ** If MAC @ [facility] or IV twilight - continue all medications as prescribed      EGD consent: I have discussed the risks (including risk of delayed/missed diagnosis), benefits, and alternatives to upper endoscopy/enteroscopy with the patient [who demonstrated understanding], including but not limited to the risks of bleeding, infection, pain, as well as the risks of anesthesia and perforation all leading to prolonged hospitalization or surgical intervention. I also specifically mentioned the miss rate of upper endoscopy of  5-10% in the best of all circumstances. It is the patient's responsibility to contact his/her insurance company regarding questions about out-of-pocket cost/benefits and was provided the appropriate diagnostic information/codes.  All questions were answered to the patient’s satisfaction. The patient has agreed to sign an informed consent and elected to proceed with upper endoscopy/enteroscopy with possible intervention [i.e. polypectomy, ablation, stent placement, etc.] as indicated.        Orders This Visit:  No orders of the defined types were placed in this encounter.      Meds This Visit:  Requested Prescriptions      No prescriptions requested or ordered in this encounter       Imaging & Referrals:  None

## 2024-03-20 NOTE — PATIENT INSTRUCTIONS
1. Gastric ulcer  2. Bloating   3. Urgency but more formed. Improved after colonoscopy      Recommend:  Metamucil daily and magnesium ever other day  Pantoprazole daily before breakfast and at night as needed  Here are some reflux precautions:   - Avoid trigger foods  - Anti-reflux measures: raising the head of the bed at night, avoiding tight clothing or belts, avoiding eating late at night and not lying down shortly after mealtime and achieving weight loss   - Avoid NSAID's, caffeine, peppermints, alcohol, tobacco and foods that incite symptoms   Add more activity  Repeat EGD  -Schedule EGD w/ possible biopsy/dilation w/MAC for gastric ulcer and pain    ** If MAC @ [facility]:    - HOLD ACE/ARBs the night before and/or the day of the procedure(s) - N/A   - NO alcohol, recreational drugs nor erectile dysfunction mediations 24 hours before procedure(s)   - NO herbal supplements or weight loss medications x 7 days prior to the procedure(s)    ** If MAC @ [facility] or  twilit - continue all medications as prescribed

## 2024-05-22 RX ORDER — PANTOPRAZOLE SODIUM 40 MG/1
40 TABLET, DELAYED RELEASE ORAL
Qty: 180 TABLET | Refills: 2 | Status: SHIPPED | OUTPATIENT
Start: 2024-05-22

## 2024-05-22 NOTE — TELEPHONE ENCOUNTER
Requested Prescriptions     Pending Prescriptions Disp Refills    PANTOPRAZOLE 40 MG Oral Tab EC [Pharmacy Med Name: PANTOPRAZOLE SOD DR 40 MG TAB] 180 tablet 2     Sig: TAKE 1 TABLET BY MOUTH 2 TIMES DAILY BEFORE MEALS.     LOV: 03/20/2024  LR: 12/26/2023    Patient has procedure on 6/20/2024.    gb

## 2024-05-24 ENCOUNTER — TELEPHONE (OUTPATIENT)
Dept: NEPHROLOGY | Facility: CLINIC | Age: 69
End: 2024-05-24

## 2024-05-24 NOTE — TELEPHONE ENCOUNTER
Rn spoke to patient and confirmed [facility] location in [address] ,[place] ,faxed lab orders to #[phone] and mailed copies of order to patient.

## 2024-05-24 NOTE — TELEPHONE ENCOUNTER
Patient scheduled follow up for 6/5/24.  She is requesting lab orders be sent to [facility].

## 2024-06-01 LAB — TACROLIMUS, HIGHLY SENSITIVE, /MS/MS: 6.7 MCG/L

## 2024-06-03 LAB
% SATURATION: 19 % (CALC) (ref 16–45)
ABSOLUTE BASOPHILS: 68 CELLS/UL (ref 0–200)
ABSOLUTE EOSINOPHILS: 372 CELLS/UL (ref 15–500)
ABSOLUTE LYMPHOCYTES: 2432 CELLS/UL (ref 850–3900)
ABSOLUTE MONOCYTES: 540 CELLS/UL (ref 200–950)
ABSOLUTE NEUTROPHILS: 4188 CELLS/UL (ref 1500–7800)
ALBUMIN/GLOBULIN RATIO: 1.4 (CALC) (ref 1–2.5)
ALBUMIN: 4.2 G/DL (ref 3.6–5.1)
ALKALINE PHOSPHATASE: 83 U/L (ref 37–153)
ALT: 6 U/L (ref 6–29)
APPEARANCE: CLEAR
AST: 12 U/L (ref 10–35)
BASOPHILS: 0.9 %
BILIRUBIN, TOTAL: 0.5 MG/DL (ref 0.2–1.2)
BILIRUBIN: NEGATIVE
BUN/CREATININE RATIO: 21 (CALC) (ref 6–22)
BUN: 27 MG/DL (ref 7–25)
CALCIUM: 9.4 MG/DL (ref 8.6–10.4)
CARBON DIOXIDE: 24 MMOL/L (ref 20–32)
CHLORIDE: 100 MMOL/L (ref 98–110)
CHOL/HDLC RATIO: 4.8 (CALC)
CHOLESTEROL, TOTAL: 193 MG/DL
COLOR: YELLOW
CREATININE, RANDOM URINE: 50 MG/DL (ref 20–275)
CREATININE: 1.27 MG/DL (ref 0.5–1.05)
EGFR: 46 ML/MIN/1.73M2
EOSINOPHILS: 4.9 %
FERRITIN: 141 NG/ML (ref 16–288)
GLOBULIN: 3.1 G/DL (CALC) (ref 1.9–3.7)
GLUCOSE: 106 MG/DL (ref 65–99)
GLUCOSE: NEGATIVE
HDL CHOLESTEROL: 40 MG/DL
HEMATOCRIT: 37 % (ref 35–45)
HEMOGLOBIN: 11.9 G/DL (ref 11.7–15.5)
IRON BINDING CAPACITY: 303 MCG/DL (CALC) (ref 250–450)
IRON, TOTAL: 58 MCG/DL (ref 45–160)
KETONES: NEGATIVE
LDL-CHOLESTEROL: 124 MG/DL (CALC)
LEUKOCYTE ESTERASE: NEGATIVE
LYMPHOCYTES: 32 %
MCH: 27.6 PG (ref 27–33)
MCHC: 32.2 G/DL (ref 32–36)
MCV: 85.8 FL (ref 80–100)
MICROALBUMIN/CREATININE RATIO, RANDOM URINE: 62 MG/G CREAT
MICROALBUMIN: 3.1 MG/DL
MONOCYTES: 7.1 %
MPV: 10.7 FL (ref 7.5–12.5)
NEUTROPHILS: 55.1 %
NITRITE: NEGATIVE
NON-HDL CHOLESTEROL: 153 MG/DL (CALC)
OCCULT BLOOD: NEGATIVE
PARATHYROID HORMONE,$INTACT: 66 PG/ML (ref 16–77)
PH: 5.5 (ref 5–8)
PLATELET COUNT: 256 THOUSAND/UL (ref 140–400)
POTASSIUM: 4 MMOL/L (ref 3.5–5.3)
PROTEIN, TOTAL, RANDOM UR: 8 MG/DL (ref 5–24)
PROTEIN, TOTAL: 7.3 G/DL (ref 6.1–8.1)
PROTEIN: NEGATIVE
RDW: 12 % (ref 11–15)
RED BLOOD CELL COUNT: 4.31 MILLION/UL (ref 3.8–5.1)
SODIUM: 134 MMOL/L (ref 135–146)
SPECIFIC GRAVITY: 1.01 (ref 1–1.03)
TRIGLYCERIDES: 174 MG/DL
URIC ACID: 6.2 MG/DL (ref 2.5–7)
VITAMIN D, 25-OH, TOTAL: 78 NG/ML (ref 30–100)
WHITE BLOOD CELL COUNT: 7.6 THOUSAND/UL (ref 3.8–10.8)

## 2024-06-05 ENCOUNTER — OFFICE VISIT (OUTPATIENT)
Facility: CLINIC | Age: 69
End: 2024-06-05

## 2024-06-05 VITALS
WEIGHT: 168 LBS | HEART RATE: 62 BPM | DIASTOLIC BLOOD PRESSURE: 70 MMHG | SYSTOLIC BLOOD PRESSURE: 130 MMHG | BODY MASS INDEX: 28 KG/M2

## 2024-06-05 DIAGNOSIS — I50.32 CHRONIC DIASTOLIC CONGESTIVE HEART FAILURE (HCC): ICD-10-CM

## 2024-06-05 DIAGNOSIS — F32.A DEPRESSION, UNSPECIFIED DEPRESSION TYPE: ICD-10-CM

## 2024-06-05 DIAGNOSIS — D63.1 ANEMIA DUE TO STAGE 3 CHRONIC KIDNEY DISEASE, UNSPECIFIED WHETHER STAGE 3A OR 3B CKD (HCC): ICD-10-CM

## 2024-06-05 DIAGNOSIS — N18.30 STAGE 3 CHRONIC KIDNEY DISEASE, UNSPECIFIED WHETHER STAGE 3A OR 3B CKD (HCC): Primary | ICD-10-CM

## 2024-06-05 DIAGNOSIS — B25.9 CYTOMEGALOVIRUS (CMV) VIREMIA (HCC): ICD-10-CM

## 2024-06-05 DIAGNOSIS — M1A.9XX0 CHRONIC GOUT WITHOUT TOPHUS, UNSPECIFIED CAUSE, UNSPECIFIED SITE: ICD-10-CM

## 2024-06-05 DIAGNOSIS — I10 HYPERTENSION, UNSPECIFIED TYPE: ICD-10-CM

## 2024-06-05 DIAGNOSIS — E78.5 DYSLIPIDEMIA: ICD-10-CM

## 2024-06-05 DIAGNOSIS — N18.30 ANEMIA DUE TO STAGE 3 CHRONIC KIDNEY DISEASE, UNSPECIFIED WHETHER STAGE 3A OR 3B CKD (HCC): ICD-10-CM

## 2024-06-05 DIAGNOSIS — Z48.22 ENCOUNTER FOR AFTERCARE FOLLOWING KIDNEY TRANSPLANT (HCC): ICD-10-CM

## 2024-06-05 PROCEDURE — 3078F DIAST BP <80 MM HG: CPT | Performed by: INTERNAL MEDICINE

## 2024-06-05 PROCEDURE — 3075F SYST BP GE 130 - 139MM HG: CPT | Performed by: INTERNAL MEDICINE

## 2024-06-05 PROCEDURE — 99214 OFFICE O/P EST MOD 30 MIN: CPT | Performed by: INTERNAL MEDICINE

## 2024-06-05 RX ORDER — PRAVASTATIN SODIUM 20 MG
20 TABLET ORAL NIGHTLY
Qty: 30 TABLET | Refills: 1 | Status: SHIPPED | OUTPATIENT
Start: 2024-06-05

## 2024-06-05 NOTE — PROGRESS NOTES
[facility]    Progress Note     Mackenzie Nelson    68 y/o with remote h/o reflux nephropathy s/p ureteral implantation with resultant renal failure requiring preemptive LRKT 2002( from her sister,  six antigen match at [facility] ),  now with CKD, HLD, Anemia, depression , gout , G1 DD, s/p covid for 5 mth in 2022. s/p monoclonal AB infusion. previously seen by nephrologists but now established care at [facility].   Was in [place] and now back   she has no PCP- her  Dr Nelson manages most of her things. I have given names of PCP to her to establish care.'  after reviewing labs  in sept i had decreased her chlorthalidone to 12.5 mg/day. She is tolerating well, no edema and good BP    1/13/23: doing ok. feels fatigued and sleepy, thinks psych medication is contributing  7/18/23 had another episode of covid while travelling. Feeling v weak  10/30/23: here for fu. Had EGD/colonoscopy on 9/25/23- egd with gastric ulcer and colonoscopy unremarkable. Repeat egd planned. Colonoscopy in 10yrs  Feels fatigued and tired  1/8/23 new labs-sodium 134, k 3.5, cr 1.17 mg/dl, eGFR 51 ml/min  Since LOV had a passing out episode. Saw neurology. Scans been negative  Also had repeat egd- neg for precancerous and infection.  Also talked to ID for CMV and plan to monitor  Also saw dermatology     Since jan 2024  Went to [place]  On and off sick  Repeat egd on jun 20th  Echo was done jan 2024- grade 1 DD, ef normal    PMH  h/o reflux nephropathy s/p ureteral implantation with resultant renal failure requiring preemptive LRKT 2002( from her sister,  six antigen match at [facility]     HISTORY:  Past Medical History:    Anxiety state    Depression    Essential hypertension    Gout    High blood pressure    High cholesterol    Renal disorder    kidney transplant 21 years ago    Visual impairment      Past Surgical History:   Procedure Laterality Date    Colonoscopy N/A 09/25/2023    Procedure: COLONOSCOPY;  Surgeon: Thiago  MD Yrn;  Location: [facility] ENDOSCOPY    Hysterectomy      Kidney surgery      Kidney transplant           Medications (Active prior to today's visit):  Current Outpatient Medications   Medication Sig Dispense Refill    pravastatin 20 MG Oral Tab Take 1 tablet (20 mg total) by mouth nightly. 30 tablet 1    PANTOPRAZOLE 40 MG Oral Tab EC TAKE 1 TABLET BY MOUTH 2 TIMES DAILY BEFORE MEALS. 180 tablet 2    aspirin 81 MG Oral Chew Tab Chew 1 tablet (81 mg total) by mouth daily.      tiZANidine 4 MG Oral Tab Take 1 tablet (4 mg total) by mouth 2 (two) times daily as needed.      Tazarotene 0.1 % External Cream Apply 1 Application topically nightly.      sertraline 100 MG Oral Tab Take 1.5 tablets (150 mg total) by mouth daily.      cyanocobalamin 1000 MCG/ML Injection Solution Inject 1 mL (1,000 mcg total) into the muscle every 30 (thirty) days.      ZILXI 1.5 % External Foam Apply 1 Application topically daily.      magnesium oxide 400 MG Oral Tab Take 1 tablet (400 mg total) by mouth daily. (Patient taking differently: Take 1 tablet (400 mg total) by mouth every other day.) 30 tablet 3    Beclomethasone Diprop HFA (QVAR REDIHALER) 80 MCG/ACT Inhalation Aerosol, Breath Activated 1 puff.      Cholecalciferol (VITAMIN D3) 125 MCG (5000 UT) Oral Tablet Dispersible as directed Orally      allopurinol 100 MG Oral Tab Take 1 tablet (100 mg total) by mouth 2 (two) times daily.      losartan 100 MG Oral Tab Take 1 tablet (100 mg total) by mouth daily.      chlorthalidone 25 MG Oral Tab Take 0.5 tablets (12.5 mg total) by mouth daily.      metoprolol succinate ER 25 MG Oral Tablet 24 Hr Take 1 tablet (25 mg total) by mouth daily.      mycophenolate mofetil 250 MG Oral Cap Take 6 capsules (1,500 mg total) by mouth 2 (two) times daily before meals.      tacrolimus 1 MG Oral Cap Take 1 capsule (1 mg total) by mouth 2 (two) times daily. Take 4 tables morning  Take 3 tables evening      traZODone 50 MG Oral Tab Take 1 tablet (50 mg  total) by mouth nightly.      acetaminophen 325 MG Oral Tab Take 1 tablet (325 mg total) by mouth every 6 (six) hours as needed for Pain.      colchicine 0.6 MG Oral Tab Take 1 tablet (0.6 mg total) by mouth daily. (Patient not taking: Reported on 6/5/2024)      rosuvastatin 10 MG Oral Tab Take 1 tablet (10 mg total) by mouth daily. (Patient not taking: Reported on 6/5/2024)      metoprolol succinate ER 25 MG Oral Tablet 24 Hr Take 1 tablet (25 mg total) by mouth every evening.      tacrolimus 1 MG Oral Cap Take 4 capsules (4 mg total) by mouth every morning.      pravastatin 20 MG Oral Tab Take 1 tablet (20 mg total) by mouth nightly. (Patient not taking: Reported on 6/5/2024) 30 tablet 3    escitalopram 10 MG Oral Tab Take 1 tablet (10 mg total) by mouth daily. (Patient not taking: Reported on 6/5/2024)      Hydroquinone 4 % External Cream Apply 1 Application topically 2 (two) times daily. (Patient not taking: Reported on 3/20/2024)      sertraline 50 MG Oral Tab Take 2 tablets (100 mg total) by mouth daily. (Patient not taking: Reported on 6/5/2024)         Allergies:  Allergies   Allergen Reactions    Dairy Products HIVES and DIARRHEA    Lactase DIARRHEA, RASH and HIVES    Codeine NAUSEA AND VOMITING         ROS:     Constitutional:  Negative for decreased activity, fever, irritability and lethargy  ENMT:  Negative for ear drainage, hearing loss and nasal drainage  Eyes:  Negative for eye discharge and vision loss  Cardiovascular:  Negative for chest pain, sob  Respiratory:  Negative for cough, dyspnea and wheezing  Gastrointestinal:  Negative for abdominal pain, constipation  Genitourinary:  Negative for dysuria and hematuria  Endocrine:  Negative for abnormal sleep patterns  Hema/Lymph:  Negative for easy bleeding and easy bruising  Integumentary:  Negative for pruritus and rash  Musculoskeletal:  Negative for bone/joint symptoms  Neurological:  Negative for gait disturbance  Psychiatric:  Negative for  inappropriate interaction and psychiatric symptoms      Vitals:    06/05/24 1054   BP: 130/70   Pulse: 62         PHYSICAL EXAM:   Constitutional: appears well hydrated alert and responsive   Head/Face: normocephalic  Eyes/Vision: normal extraocular motion is intact  Nose/Mouth/Throat:mucous membranes are moist   Neck/Thyroid: neck is supple without adenopathy  Respiratory:  lungs are clear to auscultation bilaterally  Cardiovascular: regular rate and rhythm   Abdomen: soft, non-tender, non-distended, BS normal  Skin/Hair: no unusual rashes present, no abnormal bruising noted  Musculoskeletal: no deformities  Extremities: no edema  Neurological:  Grossly normal      Lab Results   Component Value Date     (L) 05/31/2024     (L) 12/05/2023     07/14/2023    K 4.0 05/31/2024    K 3.5 12/05/2023    K 3.9 07/14/2023     05/31/2024    CL 89 (L) 12/05/2023     07/14/2023    CO2 24 05/31/2024    CO2 28 12/05/2023    CO2 26 07/14/2023    BUN 27 (H) 05/31/2024    BUN 28 (H) 12/05/2023    BUN 33 (H) 07/14/2023    CREATSERUM 1.27 (H) 05/31/2024    CREATSERUM 1.33 (H) 12/05/2023    CREATSERUM 1.31 (H) 07/14/2023    CA 9.4 05/31/2024    CA 9.6 12/05/2023    CA 9.8 07/14/2023    EGFRCR 46 (L) 05/31/2024    EGFRCR 44 (L) 12/05/2023    EGFRCR 45 (L) 07/14/2023    ALB 4.2 05/31/2024    ALB 4.3 12/05/2023    ALB 4.1 07/14/2023 2014-2019 labs reviewed cr 1.2-1.5 mg/dl  sept and dec 2021- cr 1.4mg/dl, eGFR 38 ml/min  5/2022 -1.7 mg/dl   CMV/BKV 2021 negative  [place] UA 2021  tac level 6 in 2021  May 2022 labs cr 1.7 mg/dl , BUn 41,  eGFR  30 ml/min, hgb 10.3  11/8/2022: cr 1.5 mg/dl, eGFR 37 ml/min, all electrolytes ok, ldl 102, ipth 77, UPC negative, hgb 10.4, bland UA. low iron sat, vit b 12 ok, vit d ok, tac 6.6  7/18/2023 cr 1.3 mg/dl, eGFR 45 ml/min , k 3.9, mag 1.4, phosp 4.4, TSH 5.0 free t 4 1.1 (normal), hgb 11.1,iron saturation 12 %, ferritin 7, tacrolimus 5.3  , ,    All labs  at [facility]     ASSESSMENT/PLAN:   Assessment   1. Stage 3 chronic kidney disease, unspecified whether stage 3a or 3b CKD ([facility])  - Comp Metabolic Panel (14); Future  - CK (Creatine Kinase) (Not Creatinine)  - Tacrolimus (Immunoassay); Future  - CBC, Platelet; No Differential; Future  - Lipid Panel; Future  - Urinalysis, Routine; Future  - Protein/Creatinine Ratio, Urine Random; Future  - Magnesium; Future  - PTH, Intact; Future    2. Encounter for aftercare following kidney transplant ([facility])    3. Hypertension, unspecified type    4. Anemia due to stage 3 chronic kidney disease, unspecified whether stage 3a or 3b CKD (H[facility])    5. Chronic diastolic congestive heart failure (HCC)    6. Dyslipidemia    7. Chronic gout without tophus, unspecified cause, unspecified site    8. Depression, unspecified depression type    9. 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 On chlorthalidone  Sodium is better- cr stable 1.2 mg/dl    H/o kidney transplant  s/p LRKT in 2002 - no post op complications, no rejections  on tacrolimus 4 mg in am and 3 mg in pm and cellcept 500 mg bid  tac levels 4-6 range    HTN  target BP < 140/90  on metoprolol, losartan and chlorthalidone (1/2 tablet)  + low salt diet  well controlled      Anemia  iron def but cannot take PO iron  S/p EGD and colonoscopy with Dr Das (9/25/23) - gastric ulcer. On ppi and repeat EGD. Colonoscopy negative  vitamin b12 and FA acceptable  Low iron stores (sats 12 and ferritin 7) - s/p 5 doses iv iron--> improved  irn and hgb    CHF  Grade 1 DD- follows with DR Melinda Lucas at [facility].  Compensated   On BB and diuretic  Repeat echo reviewed-Jan 2024- grade 1 DD, ef normal    Syncope  [facility] Lawrence [facility]  grade 1 DD, ef normal    Hyperlipidemia  elevated FLP, have made dietary modification  not on any statin  Ldl better  Re prescribed statin with lft in one mth    Gout   On allopurinol  UA ok    Depression  Follows up with Dr Madiha Shabbir  needs therapy for 6 weeks- getting it done    Health maintain  she needs a pcp   recommend getting mammogram,  PAP. -has been losing weight as well  Saw derm for skin checkup    CMV viremia  Low levels  She did talk to ID and plan is to monitor      PLAN  Start low dose pravastatin  Recheck labs + lfts in 1 mth  Needs PCP ( roselia maintenance)  Labs and fu in 3mth         Orders This Visit:  Orders Placed This Encounter   Procedures    Comp Metabolic Panel (14)    CK (Creatine Kinase) (Not Creatinine)    Tacrolimus (Immunoassay)    CBC, Platelet; No Differential    Lipid Panel    Urinalysis, Routine    Protein/Creatinine Ratio, Urine Random    Magnesium    PTH, Intact       Meds This Visit:  Requested Prescriptions     Signed Prescriptions Disp Refills    pravastatin 20 MG Oral Tab 30 tablet 1     Sig: Take 1 tablet (20 mg total) by mouth nightly.       Imaging & Referrals:  None       Debbie Falcon MD  6/5/2024

## 2024-06-20 ENCOUNTER — ANESTHESIA (OUTPATIENT)
Dept: ENDOSCOPY | Age: 69
End: 2024-06-20

## 2024-06-20 ENCOUNTER — ANESTHESIA EVENT (OUTPATIENT)
Dept: ENDOSCOPY | Age: 69
End: 2024-06-20

## 2024-06-20 ENCOUNTER — TELEPHONE (OUTPATIENT)
Dept: GASTROENTEROLOGY | Facility: CLINIC | Age: 69
End: 2024-06-20

## 2024-06-20 ENCOUNTER — HOSPITAL ENCOUNTER (OUTPATIENT)
Age: 69
Setting detail: HOSPITAL OUTPATIENT SURGERY
Discharge: HOME | End: 2024-06-20
Attending: INTERNAL MEDICINE | Admitting: INTERNAL MEDICINE

## 2024-06-20 VITALS
HEART RATE: 54 BPM | DIASTOLIC BLOOD PRESSURE: 67 MMHG | HEIGHT: 65 IN | WEIGHT: 164 LBS | SYSTOLIC BLOOD PRESSURE: 148 MMHG | BODY MASS INDEX: 27.32 KG/M2 | RESPIRATION RATE: 12 BRPM | OXYGEN SATURATION: 97 %

## 2024-06-20 DIAGNOSIS — R14.0 BLOATING: Primary | ICD-10-CM

## 2024-06-20 DIAGNOSIS — R68.81 EARLY SATIETY: ICD-10-CM

## 2024-06-20 DIAGNOSIS — K25.7 CHRONIC GASTRIC ULCER WITHOUT HEMORRHAGE AND WITHOUT PERFORATION: ICD-10-CM

## 2024-06-20 PROCEDURE — 43235 EGD DIAGNOSTIC BRUSH WASH: CPT | Performed by: INTERNAL MEDICINE

## 2024-06-20 PROCEDURE — 99070 SPECIAL SUPPLIES PHYS/QHP: CPT | Performed by: INTERNAL MEDICINE

## 2024-06-20 RX ORDER — SODIUM CHLORIDE, SODIUM LACTATE, POTASSIUM CHLORIDE, CALCIUM CHLORIDE 600; 310; 30; 20 MG/100ML; MG/100ML; MG/100ML; MG/100ML
INJECTION, SOLUTION INTRAVENOUS CONTINUOUS
Status: DISCONTINUED | OUTPATIENT
Start: 2024-06-20 | End: 2024-06-20

## 2024-06-20 RX ORDER — LIDOCAINE HYDROCHLORIDE 10 MG/ML
INJECTION, SOLUTION EPIDURAL; INFILTRATION; INTRACAUDAL; PERINEURAL AS NEEDED
Status: DISCONTINUED | OUTPATIENT
Start: 2024-06-20 | End: 2024-06-20 | Stop reason: SURG

## 2024-06-20 RX ORDER — NALOXONE HYDROCHLORIDE 0.4 MG/ML
0.08 INJECTION, SOLUTION INTRAMUSCULAR; INTRAVENOUS; SUBCUTANEOUS ONCE AS NEEDED
Status: DISCONTINUED | OUTPATIENT
Start: 2024-06-20 | End: 2024-06-20

## 2024-06-20 RX ADMIN — SODIUM CHLORIDE, SODIUM LACTATE, POTASSIUM CHLORIDE, CALCIUM CHLORIDE: 600; 310; 30; 20 INJECTION, SOLUTION INTRAVENOUS at 07:29:00

## 2024-06-20 RX ADMIN — LIDOCAINE HYDROCHLORIDE 50 MG: 10 INJECTION, SOLUTION EPIDURAL; INFILTRATION; INTRACAUDAL; PERINEURAL at 07:30:00

## 2024-06-20 RX ADMIN — SODIUM CHLORIDE, SODIUM LACTATE, POTASSIUM CHLORIDE, CALCIUM CHLORIDE: 600; 310; 30; 20 INJECTION, SOLUTION INTRAVENOUS at 07:35:00

## 2024-06-20 NOTE — OPERATIVE REPORT
ESOPHAGOGASTRODUODENOSCOPY (EGD) REPORT    Mackenzie Nelson     3/9/1955 Age 69 year old   PCP Dipak Nelson MD Endoscopist Yrn Das MD       Date of procedure: 24    Procedure: EGD     Pre-operative diagnosis: bloating, nonhealing ulcers, reflux, early satiety    Post-operative diagnosis: food in stomach, aborted procedure    Medications: MAC sedation    Complications: none    Procedure:  Informed consent was obtained from the patient after the risks of the procedure were discussed, including but not limited to bleeding, perforation, aspiration, infection, or possibility of a missed lesion. After discussions of the risks/benefits and alternatives to this procedure, as well as the planned sedation, the patient was placed in the left lateral decubitus position and begun on continuous blood pressure pulse oximetry and EKG monitoring and this was maintained throughout the procedure. Once an adequate level of sedation was obtained a bite block was placed. Then the lubricated tip of the [id number] diagnostic video upper endoscope was inserted and advanced using direct visualization into the posterior pharynx and ultimately into the esophagus.    Complications: None    Findings:      1. Esophagus: The squamocolumnar junction was noted at 37 cm and appeared regular. The GE junction was noted at 37 cm from the incisors. No significant hiatal hernia. The esophageal mucosa appeared normal. There was no endoscopic findings of esophagitis, stricture or Walton's esophagus.    2. Stomach: food in the stomach could not examine aborted exam given risk of aspiration    3. Duodenum: Not examined    Impression:  1. Food in the stomach    Recommend:  1. Incomplete exam    >>>If tissue was sampled/removed and you have not received your pathology results either by phone or letter within 2 weeks, please call our office at [phone].    Specimens:  NONE

## 2024-06-20 NOTE — ANESTHESIA PREPROCEDURE EVALUATION
Anesthesia PreOp Note    HPI:     Mackenzie Nelson is a 69 year old female who presents for preoperative consultation requested by: Yrn Das MD    Date of Surgery: 2024    Procedure(s):  ESOPHAGOGASTRODUODENOSCOPY  Indication: Chronic gastric ulcer, unspecified whether gastric ulcer hemorrhage or perforation present / Abdominal pain, unspecified abdominal location    Relevant Problems   No relevant active problems       NPO:  Last Liquid Consumption Date: 24  Last Liquid Consumption Time: 0550  Last Solid Consumption Date: 24  Last Solid Consumption Time: 2200  Last Liquid Consumption Date: 24          History Review:  Patient Active Problem List    Diagnosis Date Noted    Chronic gastric ulcer 2024    Gastritis with hemorrhage 2024    Irritable bowel syndrome with diarrhea 2024    Diarrhea of presumed infectious origin 2024    Iron deficiency anemia, unspecified 2023       Past Medical History:    Anxiety state    Depression    Essential hypertension    Gout    High blood pressure    High cholesterol    Renal disorder    kidney transplant 21 years ago    Visual impairment       Past Surgical History:   Procedure Laterality Date          x1    Colonoscopy N/A 2023    Procedure: COLONOSCOPY;  Surgeon: Yrn Das MD;  Location: [facility] ENDOSCOPY    Hysterectomy      Kidney surgery      Kidney transplant    Other surgical history      had surgery on uteters to save kidney       Medications Prior to Admission   Medication Sig Dispense Refill Last Dose    PANTOPRAZOLE 40 MG Oral Tab EC TAKE 1 TABLET BY MOUTH 2 TIMES DAILY BEFORE MEALS. 180 tablet 2 2024    aspirin 81 MG Oral Chew Tab Chew 1 tablet (81 mg total) by mouth daily.   2024    tiZANidine 4 MG Oral Tab Take 1 tablet (4 mg total) by mouth 2 (two) times daily as needed.   2024    sertraline 100 MG Oral Tab Take 1.5 tablets (150 mg total) by mouth daily.   2024    cyanocobalamin  1000 MCG/ML Injection Solution Inject 1 mL (1,000 mcg total) into the muscle every 30 (thirty) days.       magnesium oxide 400 MG Oral Tab Take 1 tablet (400 mg total) by mouth daily. (Patient taking differently: Take 1 tablet (400 mg total) by mouth every other day.) 30 tablet 3 6/18/2024    Beclomethasone Diprop HFA (QVAR REDIHALER) 80 MCG/ACT Inhalation Aerosol, Breath Activated 1 puff.    at prn    Cholecalciferol (VITAMIN D3) 125 MCG (5000 UT) Oral Tablet Dispersible as directed Orally   6/19/2024    allopurinol 100 MG Oral Tab Take 1 tablet (100 mg total) by mouth 2 (two) times daily.   6/19/2024    losartan 100 MG Oral Tab Take 1 tablet (100 mg total) by mouth daily.   6/19/2024    chlorthalidone 25 MG Oral Tab Take 0.5 tablets (12.5 mg total) by mouth daily.   6/19/2024    metoprolol succinate ER 25 MG Oral Tablet 24 Hr Take 1 tablet (25 mg total) by mouth daily.       mycophenolate mofetil 250 MG Oral Cap Take 6 capsules (1,500 mg total) by mouth 2 (two) times daily before meals. 4 in the morning, 3 in the evening   6/19/2024    tacrolimus 1 MG Oral Cap Take 1 capsule (1 mg total) by mouth 2 (two) times daily. Take 4 tables morning  Take 3 tables evening   6/19/2024    traZODone 50 MG Oral Tab Take 1 tablet (50 mg total) by mouth nightly.   6/19/2024    acetaminophen 325 MG Oral Tab Take 1 tablet (325 mg total) by mouth every 6 (six) hours as needed for Pain.   6/19/2024    pravastatin 20 MG Oral Tab Take 1 tablet (20 mg total) by mouth nightly. (Patient not taking: Reported on 6/17/2024) 30 tablet 1 Not Taking    colchicine 0.6 MG Oral Tab Take 1 tablet (0.6 mg total) by mouth daily. (Patient not taking: Reported on 6/5/2024)       Tazarotene 0.1 % External Cream Apply 1 Application topically nightly. (Patient not taking: Reported on 6/17/2024)   Not Taking    rosuvastatin 10 MG Oral Tab Take 1 tablet (10 mg total) by mouth daily. (Patient not taking: Reported on 6/5/2024)       metoprolol succinate ER 25  MG Oral Tablet 24 Hr Take 1 tablet (25 mg total) by mouth every evening.   6/19/2024    tacrolimus 1 MG Oral Cap Take 4 capsules (4 mg total) by mouth every morning.   6/19/2024    pravastatin 20 MG Oral Tab Take 1 tablet (20 mg total) by mouth nightly. (Patient not taking: Reported on 6/5/2024) 30 tablet 3     escitalopram 10 MG Oral Tab Take 1 tablet (10 mg total) by mouth daily. (Patient not taking: Reported on 6/5/2024)       Hydroquinone 4 % External Cream Apply 1 Application topically 2 (two) times daily. (Patient not taking: Reported on 3/20/2024)       ZILXI 1.5 % External Foam Apply 1 Application topically daily. (Patient not taking: Reported on 6/17/2024)   Not Taking    sertraline 50 MG Oral Tab Take 2 tablets (100 mg total) by mouth daily. (Patient not taking: Reported on 6/5/2024)        Current Facility-Administered Medications Ordered in Epic   Medication Dose Route Frequency Provider Last Rate Last Admin    lactated ringers infusion   Intravenous Continuous Yrn Das MD 20 mL/hr at 06/20/24 0706 New Bag at 06/20/24 0706     No current [facility]-ordered outpatient medications on file.       Allergies   Allergen Reactions    Dairy Products HIVES and DIARRHEA    Lactase DIARRHEA, RASH and HIVES    Codeine NAUSEA AND VOMITING       Family History   Problem Relation Age of Onset    Diabetes Mother     Hypertension Mother     Other (Other) Brother      Social History     Socioeconomic History    Marital status:    Tobacco Use    Smoking status: Never    Smokeless tobacco: Never   Vaping Use    Vaping status: Never Used   Substance and Sexual Activity    Alcohol use: Not Currently    Drug use: Never       Available pre-op labs reviewed.  Lab Results   Component Value Date    WBC 7.6 05/31/2024    RBC 4.31 05/31/2024    HGB 11.9 05/31/2024    HCT 37.0 05/31/2024    MCV 85.8 05/31/2024    MCH 27.6 05/31/2024    MCHC 32.2 05/31/2024    RDW 12.0 05/31/2024     05/31/2024     Lab Results   Component  Value Date     (L) 05/31/2024    K 4.0 05/31/2024     05/31/2024    CO2 24 05/31/2024    BUN 27 (H) 05/31/2024    CREATSERUM 1.27 (H) 05/31/2024     (H) 05/31/2024    CA 9.4 05/31/2024          Vital Signs:  Body mass index is 27.29 kg/m².   height is 1.651 m (5' 5\") and weight is 74.4 kg (164 lb). Her blood pressure is 143/84 and her pulse is 55. Her respiration is 12 and oxygen saturation is 97%.   Vitals:    06/17/24 1721 06/20/24 0705   BP:  143/84   Pulse:  55   Resp:  12   SpO2:  97%   Weight: 74.4 kg (164 lb)    Height: 1.651 m (5' 5\")         Anesthesia Evaluation      Airway   Mallampati: II  TM distance: >3 FB  Neck ROM: full  Dental          Pulmonary - negative ROS   Cardiovascular     Neuro/Psych      GI/Hepatic/Renal    (+) chronic renal disease    Comments: Kidney transplant pt    Endo/Other - negative ROS   Abdominal                  Anesthesia Plan:   ASA:  3  Plan:   MAC  Plan Comments: I have discussed the anesthetic plan, major risks and alternatives with the patient and answered all questions. The patient desires to proceed with surgery and anesthesia as planned.     Informed Consent Plan and Risks Discussed With:  Patient      I have informed Mackenzie Cárdenasar and/or legal guardian or family member of the nature of the anesthetic plan, benefits, risks including possible dental damage if relevant, major complications, and any alternative forms of anesthetic management.   All of the patient's questions were answered to the best of my ability. The patient desires the anesthetic management as planned.  AGUSTIN SALGUERO DO  6/20/2024 7:12 AM  Present on Admission:  **None**

## 2024-06-20 NOTE — TELEPHONE ENCOUNTER
Schedulers: Please assist in scheduling another EGD, pt had food in the stomach today so EGD was aborted. Unsure if ok to use previous EGD order?  Thanks,  Brigida KIM called pt, no answer so left message asking pt to call GI office back (needs to schedule UGI). GI office number provided.

## 2024-06-20 NOTE — DISCHARGE INSTRUCTIONS
Home Care Instructions for Gastroscopy with Sedation    Diet:  - Resume your regular diet as tolerated unless otherwise instructed.  - Start with light meals to minimize bloating.  - Do not drink alcohol today.    Medication:  - If you have questions about resuming your normal medications, please contact your Primary Care Physician.    Activities:  - Take it easy today. Do not return to work today.  - Do not drive today.  - Do not operate any machinery today (including kitchen equipment).    Gastroscopy:  - You may have a sore throat for 2-3 days following the exam. This is normal. Gargling with warm salt water (1/2 tsp salt to 1 glass warm water) or using throat lozenges will help.  - If you experience any sharp pain in your neck, abdomen or chest, vomiting of blood, oral temperature over 100 degrees Fahrenheit, light-headedness or dizziness, or any other problems, contact your doctor.    **If unable to reach your doctor, please go to the [facility] Emergency Room**    - Your referring physician will receive a full report of your examination.  - If you do not hear from your doctor's office within two weeks of your biopsy, please call them for your results.    You may be able to see your laboratory results in [employer] between 4 and 7 business days.  In some cases, your physician may not have viewed the results before they are released to [employer].  If you have questions regarding your results contact the physician who ordered the test/exam by phone or via [employer] by choosing \"Ask a Medical Question.\"

## 2024-06-20 NOTE — ANESTHESIA POSTPROCEDURE EVALUATION
Patient: Mackenzie Nelson    Procedure Summary       Date: 06/20/24 Room / Location: [facility] ENDOSCOPY 01 / [facility] ENDO    Anesthesia Start: 0728 Anesthesia Stop:     Procedure: ESOPHAGOGASTRODUODENOSCOPY Diagnosis:       Chronic gastric ulcer, unspecified whether gastric ulcer hemorrhage or perforation present      Abdominal pain, unspecified abdominal location      (incomplete exam, food in stomach)    Surgeons: Yrn Das MD Anesthesiologist: Ulises Canales DO    Anesthesia Type: MAC ASA Status: 3            Anesthesia Type: MAC    Vitals Value Taken Time   /65 06/20/24 0739   Temp  06/20/24 0741   Pulse 57 06/20/24 0739   Resp 18 06/20/24 0739   SpO2 94 % 06/20/24 0739       [facility] AN Post Evaluation:   Patient Evaluated in PACU  Patient Participation: complete - patient participated  Level of Consciousness: awake  Pain Management: adequate  Airway Patency:patent  Dental exam unchanged from preop  Yes    Nausea/Vomiting: none  Cardiovascular Status: acceptable  Respiratory Status: acceptable  Postoperative Hydration acceptable      ULISES CANALES DO  6/20/2024 7:41 AM

## 2024-06-20 NOTE — H&P
Pre Procedure History & Physical Examination    Patient Name: Mackenzie Nelson  MRN: [id number]  [facility]: [id number]  YOB: 1955    Diagnosis: bloating, gas, history of nonhealing gastric ulcers    Medications Prior to Admission   Medication Sig Dispense Refill Last Dose    PANTOPRAZOLE 40 MG Oral Tab EC TAKE 1 TABLET BY MOUTH 2 TIMES DAILY BEFORE MEALS. 180 tablet 2 6/20/2024    aspirin 81 MG Oral Chew Tab Chew 1 tablet (81 mg total) by mouth daily.   6/19/2024    tiZANidine 4 MG Oral Tab Take 1 tablet (4 mg total) by mouth 2 (two) times daily as needed.   6/20/2024    sertraline 100 MG Oral Tab Take 1.5 tablets (150 mg total) by mouth daily.   6/19/2024    cyanocobalamin 1000 MCG/ML Injection Solution Inject 1 mL (1,000 mcg total) into the muscle every 30 (thirty) days.       magnesium oxide 400 MG Oral Tab Take 1 tablet (400 mg total) by mouth daily. (Patient taking differently: Take 1 tablet (400 mg total) by mouth every other day.) 30 tablet 3 6/18/2024    Beclomethasone Diprop HFA (QVAR REDIHALER) 80 MCG/ACT Inhalation Aerosol, Breath Activated 1 puff.    at prn    Cholecalciferol (VITAMIN D3) 125 MCG (5000 UT) Oral Tablet Dispersible as directed Orally   6/19/2024    allopurinol 100 MG Oral Tab Take 1 tablet (100 mg total) by mouth 2 (two) times daily.   6/19/2024    losartan 100 MG Oral Tab Take 1 tablet (100 mg total) by mouth daily.   6/19/2024    chlorthalidone 25 MG Oral Tab Take 0.5 tablets (12.5 mg total) by mouth daily.   6/19/2024    metoprolol succinate ER 25 MG Oral Tablet 24 Hr Take 1 tablet (25 mg total) by mouth daily.       mycophenolate mofetil 250 MG Oral Cap Take 6 capsules (1,500 mg total) by mouth 2 (two) times daily before meals. 4 in the morning, 3 in the evening   6/19/2024    tacrolimus 1 MG Oral Cap Take 1 capsule (1 mg total) by mouth 2 (two) times daily. Take 4 tables morning  Take 3 tables evening   6/19/2024    traZODone 50 MG Oral Tab Take 1 tablet (50 mg total) by mouth  nightly.   6/19/2024    acetaminophen 325 MG Oral Tab Take 1 tablet (325 mg total) by mouth every 6 (six) hours as needed for Pain.   6/19/2024    pravastatin 20 MG Oral Tab Take 1 tablet (20 mg total) by mouth nightly. (Patient not taking: Reported on 6/17/2024) 30 tablet 1 Not Taking    colchicine 0.6 MG Oral Tab Take 1 tablet (0.6 mg total) by mouth daily. (Patient not taking: Reported on 6/5/2024)       Tazarotene 0.1 % External Cream Apply 1 Application topically nightly. (Patient not taking: Reported on 6/17/2024)   Not Taking    rosuvastatin 10 MG Oral Tab Take 1 tablet (10 mg total) by mouth daily. (Patient not taking: Reported on 6/5/2024)       metoprolol succinate ER 25 MG Oral Tablet 24 Hr Take 1 tablet (25 mg total) by mouth every evening.   6/19/2024    tacrolimus 1 MG Oral Cap Take 4 capsules (4 mg total) by mouth every morning.   6/19/2024    pravastatin 20 MG Oral Tab Take 1 tablet (20 mg total) by mouth nightly. (Patient not taking: Reported on 6/5/2024) 30 tablet 3     escitalopram 10 MG Oral Tab Take 1 tablet (10 mg total) by mouth daily. (Patient not taking: Reported on 6/5/2024)       Hydroquinone 4 % External Cream Apply 1 Application topically 2 (two) times daily. (Patient not taking: Reported on 3/20/2024)       ZILXI 1.5 % External Foam Apply 1 Application topically daily. (Patient not taking: Reported on 6/17/2024)   Not Taking    sertraline 50 MG Oral Tab Take 2 tablets (100 mg total) by mouth daily. (Patient not taking: Reported on 6/5/2024)        Current Facility-Administered Medications   Medication Dose Route Frequency    lactated ringers infusion   Intravenous Continuous       Allergies:   Allergies   Allergen Reactions    Dairy Products HIVES and DIARRHEA    Lactase DIARRHEA, RASH and HIVES    Codeine NAUSEA AND VOMITING       Past Medical History:    Anxiety state    Depression    Essential hypertension    Gout    High blood pressure    High cholesterol    Renal disorder    kidney  transplant 21 years ago    Visual impairment     Past Surgical History:   Procedure Laterality Date          x1    Colonoscopy N/A 2023    Procedure: COLONOSCOPY;  Surgeon: Yrn Das MD;  Location: [facility] ENDOSCOPY    Hysterectomy      Kidney surgery      Kidney transplant    Other surgical history      had surgery on uteters to save kidney     Family History   Problem Relation Age of Onset    Diabetes Mother     Hypertension Mother     Other (Other) Brother      Social History     Tobacco Use    Smoking status: Never    Smokeless tobacco: Never   Substance Use Topics    Alcohol use: Not Currently         ROS:   CONSTITUTIONAL:  negative for fevers, rigors  EYES:  negative for diplopia   RESPIRATORY:  negative for severe shortness of breath  CARDIOVASCULAR:  negative for crushing sub-sternal chest pain  GASTROINTESTINAL:  see HPI  GENITOURINARY:  negative for dysuria or gross hematuria  INTEGUMENT/BREAST:  SKIN:  negative for jaundice   ALLERGIC/IMMUNOLOGIC:  negative for hay fever  ENDOCRINE:  negative for cold intolerance and heat intolerance  MUSCULOSKELETAL:  negative for joint effusion/severe erythema  BEHAVIOR/PSYCH:  negative for psychotic behavior      PHYSICAL EXAM:   /84 (BP Location: Left arm)   Pulse 55   Resp 12   Ht 5' 5\" (1.651 m)   Wt 164 lb (74.4 kg)   SpO2 97%   BMI 27.29 kg/m²       Gen: Patient appears comfortable and in no acute discomfort  HEENT: the sclera appears anicteric, oropharynx clear, mucus membranes appear moist  CV: regular rate and rhythm, the extremities are warm and well perfused   Lung: Moves air well; No labored breathing  Abdomen: soft, non-tender exam in all quadrants without rigidity or guarding, non-distended, no abnormal bowel sounds noted, no masses are palpated  Skin: No jaundice  Ext: no cyanosis, clubbing or edema is evident.   Neuro: Alert and interactive, and gross movements of extremities normal    I have discussed the risks and benefits  and alternatives of the procedure with the patient/family.  They understand and agree to proceed with plan of care.   I have reviewed recent H&P/clinic notes  Yrn Das MD  [facility] - Gastroenterology  6/20/2024  7:28 AM

## 2024-06-21 NOTE — TELEPHONE ENCOUNTER
RN called pt, no answer so left message informing pt that MD ordered imaging that still needs scheduling. Provided scheduling phone number and GI office number.

## 2024-06-21 NOTE — TELEPHONE ENCOUNTER
Called patient - scheduled another EGD with Dr. Das for 7/18/2024 at [facility].    Please refer to telephone encounter dated 3/20/2024 for scheduling orders.

## 2024-06-24 ENCOUNTER — TELEPHONE (OUTPATIENT)
Facility: CLINIC | Age: 69
End: 2024-06-24

## 2024-06-24 NOTE — TELEPHONE ENCOUNTER
RN called and spoke to pt. Informed pt that Upper GI XR was ordered, provided central scheduling dept phone number. Pt agreeable and verbalized understanding.

## 2024-06-26 ENCOUNTER — HOSPITAL ENCOUNTER (OUTPATIENT)
Dept: GENERAL RADIOLOGY | Facility: HOSPITAL | Age: 69
Discharge: HOME | End: 2024-06-26
Attending: INTERNAL MEDICINE

## 2024-06-26 DIAGNOSIS — R14.0 BLOATING: ICD-10-CM

## 2024-06-26 DIAGNOSIS — K25.7 CHRONIC GASTRIC ULCER WITHOUT HEMORRHAGE AND WITHOUT PERFORATION: ICD-10-CM

## 2024-06-26 DIAGNOSIS — R68.81 EARLY SATIETY: ICD-10-CM

## 2024-06-26 PROCEDURE — 74246 X-RAY XM UPR GI TRC 2CNTRST: CPT | Performed by: INTERNAL MEDICINE

## 2024-06-26 PROCEDURE — 74248 X-RAY SM INT F-THRU STD: CPT | Performed by: INTERNAL MEDICINE

## 2024-07-03 ENCOUNTER — TELEPHONE (OUTPATIENT)
Facility: CLINIC | Age: 69
End: 2024-07-03

## 2024-07-03 NOTE — TELEPHONE ENCOUNTER
----- Message from Yrn Das sent at 6/30/2024 12:42 PM CDT -----  Follow up in office after EGD in August/sept to review results  Just reflux on UGI

## 2024-07-03 NOTE — TELEPHONE ENCOUNTER
RN called and spoke to pt. Pt scheduled for clinic appt on 8/28/24. Date, time, and location verified with pt. Pt verbalized understanding.

## 2024-07-15 ENCOUNTER — LAB ENCOUNTER (OUTPATIENT)
Dept: LAB | Age: 69
End: 2024-07-15
Attending: INTERNAL MEDICINE
Payer: COMMERCIAL

## 2024-07-15 DIAGNOSIS — Z01.818 PRE-OP TESTING: ICD-10-CM

## 2024-07-15 PROCEDURE — 87635 SARS-COV-2 COVID-19 AMP PRB: CPT

## 2024-07-16 ENCOUNTER — TELEPHONE (OUTPATIENT)
Facility: CLINIC | Age: 69
End: 2024-07-16

## 2024-07-16 LAB — SARS-COV-2 RNA RESP QL NAA+PROBE: NOT DETECTED

## 2024-07-16 NOTE — TELEPHONE ENCOUNTER
Patient is returning call and procedure questions and also asking for her result from covid test. Patient says she still has a cough and post nasal drip. Please call at [phone],thanks.  *see closed telephone encounter 7/3

## 2024-07-17 ENCOUNTER — TELEPHONE (OUTPATIENT)
Facility: CLINIC | Age: 69
End: 2024-07-17

## 2024-07-17 NOTE — TELEPHONE ENCOUNTER
Also see 7/3/2024 telephone encounter regarding COVID results  - patient returned call.  Please call.  Thank you.

## 2024-08-26 ENCOUNTER — TELEPHONE (OUTPATIENT)
Dept: NEPHROLOGY | Facility: CLINIC | Age: 69
End: 2024-08-26

## 2024-08-26 NOTE — TELEPHONE ENCOUNTER
Called pt to call if assistance needed.    Labs drawn today; to be reviewed by MARTA Falcon once results are in.    Has appt with Dr. Falcon 9/19/24

## 2024-09-11 NOTE — TELEPHONE ENCOUNTER
Patient calling states needs orders to be mailed to household prior to appointment on 9/19/24. Please call.

## 2024-09-12 NOTE — TELEPHONE ENCOUNTER
Left voice message; to make sure patient has lab orders prior to appointment 9/19/24- faxed to [facility] in [place]; previous lab used by patient.

## 2024-09-19 ENCOUNTER — TELEPHONE (OUTPATIENT)
Dept: NEPHROLOGY | Facility: CLINIC | Age: 69
End: 2024-09-19

## 2024-09-19 DIAGNOSIS — Z48.22 ENCOUNTER FOR AFTERCARE FOLLOWING KIDNEY TRANSPLANT (HCC): ICD-10-CM

## 2024-09-19 DIAGNOSIS — E11.21 TYPE 2 DIABETES MELLITUS WITH NEPHROPATHY (HCC): Primary | ICD-10-CM

## 2024-09-19 NOTE — TELEPHONE ENCOUNTER
Called patient- faxed order to [facility] in [place] [phone]    Called [facility]- Added to patients blood draw

## 2024-09-19 NOTE — TELEPHONE ENCOUNTER
Patient calling' is at [facility] for blood draw  Requesting we fax A1c lab order; no order in place- has pre-clinical lab orders    Patient notified Dr. Falcon will not respond now- will need to wait for a respond or call pcp.    Patient hung up in the middle of the call, preferred not to keep talking to nurse.

## 2024-09-23 ENCOUNTER — OFFICE VISIT (OUTPATIENT)
Facility: CLINIC | Age: 69
End: 2024-09-23

## 2024-09-23 VITALS
HEIGHT: 65 IN | DIASTOLIC BLOOD PRESSURE: 70 MMHG | WEIGHT: 171 LBS | SYSTOLIC BLOOD PRESSURE: 120 MMHG | BODY MASS INDEX: 28.49 KG/M2 | HEART RATE: 61 BPM

## 2024-09-23 DIAGNOSIS — B25.9 CYTOMEGALOVIRUS (CMV) VIREMIA (HCC): ICD-10-CM

## 2024-09-23 DIAGNOSIS — F32.A DEPRESSION, UNSPECIFIED DEPRESSION TYPE: ICD-10-CM

## 2024-09-23 DIAGNOSIS — E78.5 DYSLIPIDEMIA: ICD-10-CM

## 2024-09-23 DIAGNOSIS — I10 HYPERTENSION, UNSPECIFIED TYPE: ICD-10-CM

## 2024-09-23 DIAGNOSIS — D63.1 ANEMIA DUE TO STAGE 3 CHRONIC KIDNEY DISEASE, UNSPECIFIED WHETHER STAGE 3A OR 3B CKD (HCC): ICD-10-CM

## 2024-09-23 DIAGNOSIS — N18.30 STAGE 3 CHRONIC KIDNEY DISEASE, UNSPECIFIED WHETHER STAGE 3A OR 3B CKD (HCC): ICD-10-CM

## 2024-09-23 DIAGNOSIS — Z48.22 ENCOUNTER FOR AFTERCARE FOLLOWING KIDNEY TRANSPLANT (HCC): Primary | ICD-10-CM

## 2024-09-23 DIAGNOSIS — N18.30 ANEMIA DUE TO STAGE 3 CHRONIC KIDNEY DISEASE, UNSPECIFIED WHETHER STAGE 3A OR 3B CKD (HCC): ICD-10-CM

## 2024-09-23 DIAGNOSIS — I50.32 CHRONIC DIASTOLIC CONGESTIVE HEART FAILURE (HCC): ICD-10-CM

## 2024-09-23 PROCEDURE — 3061F NEG MICROALBUMINURIA REV: CPT | Performed by: INTERNAL MEDICINE

## 2024-09-23 PROCEDURE — 3008F BODY MASS INDEX DOCD: CPT | Performed by: INTERNAL MEDICINE

## 2024-09-23 PROCEDURE — 3060F POS MICROALBUMINURIA REV: CPT | Performed by: INTERNAL MEDICINE

## 2024-09-23 PROCEDURE — 99214 OFFICE O/P EST MOD 30 MIN: CPT | Performed by: INTERNAL MEDICINE

## 2024-09-23 PROCEDURE — 3078F DIAST BP <80 MM HG: CPT | Performed by: INTERNAL MEDICINE

## 2024-09-23 PROCEDURE — 3074F SYST BP LT 130 MM HG: CPT | Performed by: INTERNAL MEDICINE

## 2024-09-23 RX ORDER — PRAVASTATIN SODIUM 20 MG
20 TABLET ORAL NIGHTLY
Qty: 30 TABLET | Refills: 1 | Status: SHIPPED | OUTPATIENT
Start: 2024-09-23

## 2024-09-23 NOTE — PROGRESS NOTES
[facility]    Progress Note     Mackenzie Nelson    68 y/o with remote h/o reflux nephropathy s/p ureteral implantation with resultant renal failure requiring preemptive LRKT ( from her sister,  six antigen match at [facility] ),  now with CKD, HLD, Anemia, depression , gout , G1 DD, s/p covid for 5 mth in . s/p monoclonal AB infusion. previously seen by nephrologists but now established care at [facility].   Was in [place] and now back   she has no PCP- her  Dr Nelson manages most of her things. I have given names of PCP to her to establish care.'  after reviewing labs  in sept i had decreased her chlorthalidone to 12.5 mg/day. She is tolerating well, no edema and good BP    23: doing ok. feels fatigued and sleepy, thinks psych medication is contributing  23 had another episode of covid while travelling. Feeling v weak  10/30/23: here for fu. Had EGD/colonoscopy on 23- egd with gastric ulcer and colonoscopy unremarkable. Repeat egd planned. Colonoscopy in 10yrs  Feels fatigued and tired  23 new labs-sodium 134, k 3.5, cr 1.17 mg/dl, eGFR 51 ml/min  Since LOV had a passing out episode. Saw neurology. Scans been negative  Also had repeat egd- neg for precancerous and infection.  Also talked to ID for CMV and plan to monitor  Also saw dermatology     Since 2024  Went to [place]  On and off sick  Repeat egd on   Echo was done 2024- grade 1 DD, ef normal    Since lov  No new issues feels tired  Never started statin    PMH  h/o reflux nephropathy s/p ureteral implantation with resultant renal failure requiring preemptive LRKT ( from her sister,  six antigen match at [facility]     HISTORY:  Past Medical History:    Anxiety state    Depression    Essential hypertension    Gout    High blood pressure    High cholesterol    Renal disorder    kidney transplant 21 years ago    Visual impairment      Past Surgical History:   Procedure Laterality Date           x1    Colonoscopy N/A 09/25/2023    Procedure: COLONOSCOPY;  Surgeon: Yrn Das MD;  Location: [facility] ENDOSCOPY    Egd  06/20/2024    Dr. Das; incomplete exam, food in stomach    Hysterectomy      Kidney surgery      Kidney transplant    Other surgical history      had surgery on uteters to save kidney           Medications (Active prior to today's visit):  Current Outpatient Medications   Medication Sig Dispense Refill    pravastatin 20 MG Oral Tab Take 1 tablet (20 mg total) by mouth nightly. 30 tablet 1    PANTOPRAZOLE 40 MG Oral Tab EC TAKE 1 TABLET BY MOUTH 2 TIMES DAILY BEFORE MEALS. 180 tablet 2    aspirin 81 MG Oral Chew Tab Chew 1 tablet (81 mg total) by mouth daily.      tiZANidine 4 MG Oral Tab Take 1 tablet (4 mg total) by mouth 2 (two) times daily as needed.      metoprolol succinate ER 25 MG Oral Tablet 24 Hr Take 1 tablet (25 mg total) by mouth every evening.      sertraline 100 MG Oral Tab Take 1.5 tablets (150 mg total) by mouth daily.      tacrolimus 1 MG Oral Cap Take 4 capsules (4 mg total) by mouth every morning.      cyanocobalamin 1000 MCG/ML Injection Solution Inject 1 mL (1,000 mcg total) into the muscle every 30 (thirty) days.      Beclomethasone Diprop HFA (QVAR REDIHALER) 80 MCG/ACT Inhalation Aerosol, Breath Activated 1 puff.      Cholecalciferol (VITAMIN D3) 125 MCG (5000 UT) Oral Tablet Dispersible as directed Orally      allopurinol 100 MG Oral Tab Take 1 tablet (100 mg total) by mouth 2 (two) times daily.      losartan 100 MG Oral Tab Take 1 tablet (100 mg total) by mouth daily.      chlorthalidone 25 MG Oral Tab Take 0.5 tablets (12.5 mg total) by mouth daily.      mycophenolate mofetil 250 MG Oral Cap Take 6 capsules (1,500 mg total) by mouth 2 (two) times daily before meals. 4 in the morning, 3 in the evening      tacrolimus 1 MG Oral Cap Take 1 capsule (1 mg total) by mouth 2 (two) times daily. Take 4 tables morning  Take 3 tables evening      traZODone 50 MG Oral Tab Take 1  tablet (50 mg total) by mouth nightly.      acetaminophen 325 MG Oral Tab Take 1 tablet (325 mg total) by mouth every 6 (six) hours as needed for Pain.      pravastatin 20 MG Oral Tab Take 1 tablet (20 mg total) by mouth nightly. (Patient not taking: Reported on 6/17/2024) 30 tablet 1    colchicine 0.6 MG Oral Tab Take 1 tablet (0.6 mg total) by mouth daily. (Patient not taking: Reported on 6/5/2024)      Tazarotene 0.1 % External Cream Apply 1 Application topically nightly. (Patient not taking: Reported on 6/17/2024)      rosuvastatin 10 MG Oral Tab Take 1 tablet (10 mg total) by mouth daily. (Patient not taking: Reported on 6/5/2024)      pravastatin 20 MG Oral Tab Take 1 tablet (20 mg total) by mouth nightly. (Patient not taking: Reported on 6/5/2024) 30 tablet 3    escitalopram 10 MG Oral Tab Take 1 tablet (10 mg total) by mouth daily. (Patient not taking: Reported on 6/5/2024)      Hydroquinone 4 % External Cream Apply 1 Application topically 2 (two) times daily. (Patient not taking: Reported on 3/20/2024)      ZILXI 1.5 % External Foam Apply 1 Application topically daily. (Patient not taking: Reported on 6/17/2024)      magnesium oxide 400 MG Oral Tab Take 1 tablet (400 mg total) by mouth daily. (Patient taking differently: Take 1 tablet (400 mg total) by mouth every other day.) 30 tablet 3    sertraline 50 MG Oral Tab Take 2 tablets (100 mg total) by mouth daily. (Patient not taking: Reported on 6/5/2024)         Allergies:  Allergies   Allergen Reactions    Dairy Products HIVES and DIARRHEA    Lactase DIARRHEA, RASH and HIVES    Codeine NAUSEA AND VOMITING         ROS:     Constitutional:  Negative for decreased activity, fever, irritability and lethargy  ENMT:  Negative for ear drainage, hearing loss and nasal drainage  Eyes:  Negative for eye discharge and vision loss  Cardiovascular:  Negative for chest pain, sob  Respiratory:  Negative for cough, dyspnea and wheezing  Gastrointestinal:  Negative for  abdominal pain, constipation  Genitourinary:  Negative for dysuria and hematuria  Endocrine:  Negative for abnormal sleep patterns  Hema/Lymph:  Negative for easy bleeding and easy bruising  Integumentary:  Negative for pruritus and rash  Musculoskeletal:  Negative for bone/joint symptoms  Neurological:  Negative for gait disturbance  Psychiatric:  Negative for inappropriate interaction and psychiatric symptoms      Vitals:    09/23/24 1059   BP: 120/70   Pulse: 61         PHYSICAL EXAM:   Constitutional: appears well hydrated alert and responsive   Head/Face: normocephalic  Eyes/Vision: normal extraocular motion is intact  Nose/Mouth/Throat:mucous membranes are moist   Neck/Thyroid: neck is supple without adenopathy  Respiratory:  lungs are clear to auscultation bilaterally  Cardiovascular: regular rate and rhythm   Abdomen: soft, non-tender, non-distended, BS normal  Skin/Hair: no unusual rashes present, no abnormal bruising noted  Musculoskeletal: no deformities  Extremities: no edema  Neurological:  Grossly normal      Lab Results   Component Value Date     (L) 05/31/2024     (L) 12/05/2023     07/14/2023    K 4.0 05/31/2024    K 3.5 12/05/2023    K 3.9 07/14/2023     05/31/2024    CL 89 (L) 12/05/2023     07/14/2023    CO2 24 05/31/2024    CO2 28 12/05/2023    CO2 26 07/14/2023    BUN 27 (H) 05/31/2024    BUN 28 (H) 12/05/2023    BUN 33 (H) 07/14/2023    CREATSERUM 1.27 (H) 05/31/2024    CREATSERUM 1.33 (H) 12/05/2023    CREATSERUM 1.31 (H) 07/14/2023    CA 9.4 05/31/2024    CA 9.6 12/05/2023    CA 9.8 07/14/2023    EGFRCR 46 (L) 05/31/2024    EGFRCR 44 (L) 12/05/2023    EGFRCR 45 (L) 07/14/2023    ALB 4.2 05/31/2024    ALB 4.3 12/05/2023    ALB 4.1 07/14/2023 2014-2019 labs reviewed cr 1.2-1.5 mg/dl  sept and dec 2021- cr 1.4mg/dl, eGFR 38 ml/min  5/2022 -1.7 mg/dl   CMV/BKV 2021 negative  [place] UA 2021  tac level 6 in 2021  May 2022 labs cr 1.7 mg/dl , BUn 41,  eGFR  30  ml/min, hgb 10.3  11/8/2022: cr 1.5 mg/dl, eGFR 37 ml/min, all electrolytes ok, ldl 102, ipth 77, UPC negative, hgb 10.4, bland UA. low iron sat, vit b 12 ok, vit d ok, tac 6.6  7/18/2023 cr 1.3 mg/dl, eGFR 45 ml/min , k 3.9, mag 1.4, phosp 4.4, TSH 5.0 free t 4 1.1 (normal), hgb 11.1,iron saturation 12 %, ferritin 7, tacrolimus 5.3  , ,    All labs at [facility]     ASSESSMENT/PLAN:   Assessment   1. Stage 3 chronic kidney disease, unspecified whether stage 3a or 3b CKD (HCC)  - CK (Creatine Kinase) (Not Creatinine)    2. Encounter for aftercare following kidney transplant (HCC)  - Renal Function Panel; Future  - CBC With Differential With Platelet; Future  - Urinalysis with Culture Reflex; Future  - Protein/Creatinine Ratio, Urine Random; Future  - Magnesium; Future  - Tacrolimus (Immunoassay); Future  - Liver Function Panel (7); Future  - Lipid Panel; Future  - Hemoglobin A1C; Future  - Comp Metabolic Panel (14); Future  - Renal Function Panel  - CBC With Differential With Platelet  - Urinalysis with Culture Reflex  - Magnesium  - Tacrolimus (Immunoassay)  - Liver Function Panel (7)  - Lipid Panel  - Hemoglobin A1C  - Comp Metabolic Panel (14)    3. Hypertension, unspecified type    4. Anemia due to stage 3 chronic kidney disease, unspecified whether stage 3a or 3b CKD (HCC)    5. Chronic diastolic congestive heart failure (HCC)    6. Dyslipidemia    7. Depression, unspecified depression type    8. 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  On chlorthalidone  Sodium is better- cr stable 1.2 mg/dl    Need to be complaint with magnesium     H/o kidney transplant  s/p LRKT in 2002 - no post op complications, no rejections  on tacrolimus 4 mg in am and 3 mg in pm and cellcept 500 mg bid  tac levels 4-6 range    HTN  target BP < 140/90  on metoprolol, losartan and chlorthalidone (1/2 tablet)  + low salt diet  well controlled      Anemia  iron def but cannot take PO iron  S/p EGD and colonoscopy with Dr Das (9/25/23) - gastric ulcer. On ppi and repeat EGD. Colonoscopy negative  vitamin b12 and FA acceptable  Low iron stores (sats 12 and ferritin 7) - s/p 5 doses iv iron--> improved irn and hgb    CHF  Grade 1 DD- follows with DR Melinda Lucas at [facility].  Compensated   On BB and diuretic  Repeat echo reviewed-Jan 2024- grade 1 DD, ef normal    Syncope  Saw [facility] Lawrence at [facility]  grade 1 DD, ef normal    Hyperlipidemia  elevated FLP, have made dietary modification  not on any statin  Ldl better  Re prescribed statin with lft in one month ( I reordered)     Gout   On allopurinol  UA ok    Depression  Follows up with Dr Madiha Shabbir  needs therapy for 6 weeks- getting it done    Health maintain  she needs a pcp   recommend getting mammogram,  PAP. -has been losing weight as well  Saw derm for skin checkup    CMV viremia  Low levels  She did talk to ID and plan is to monitor    GERD/ulcers  Following up with GI      PLAN  Stable renal fx  Start low dose pravastatin  Recheck labs + lfts in 1 mth  Needs PCP ( roselia maintenance)  Labs and fu in 3mth         Orders This Visit:  Orders Placed This Encounter   Procedures    Renal Function Panel    CBC With Differential With Platelet    Urinalysis with Culture Reflex    Protein/Creatinine Ratio, Urine Random    Magnesium    Tacrolimus (Immunoassay)    Liver Function Panel (7)    Lipid Panel    Hemoglobin A1C    Comp Metabolic Panel (14)    CK (Creatine Kinase) (Not Creatinine)       Meds This Visit:  Requested  Prescriptions     Signed Prescriptions Disp Refills    pravastatin 20 MG Oral Tab 30 tablet 1     Sig: Take 1 tablet (20 mg total) by mouth nightly.       Imaging & Referrals:  None       Debbie Falcon MD  9/23/2024

## 2024-10-31 ENCOUNTER — ANESTHESIA (OUTPATIENT)
Dept: ENDOSCOPY | Age: 69
End: 2024-10-31
Payer: COMMERCIAL

## 2024-10-31 ENCOUNTER — ANESTHESIA EVENT (OUTPATIENT)
Dept: ENDOSCOPY | Age: 69
End: 2024-10-31
Payer: COMMERCIAL

## 2024-10-31 ENCOUNTER — HOSPITAL ENCOUNTER (OUTPATIENT)
Age: 69
Setting detail: HOSPITAL OUTPATIENT SURGERY
Discharge: HOME | End: 2024-10-31
Attending: INTERNAL MEDICINE | Admitting: INTERNAL MEDICINE
Payer: COMMERCIAL

## 2024-10-31 VITALS
HEART RATE: 52 BPM | OXYGEN SATURATION: 96 % | DIASTOLIC BLOOD PRESSURE: 92 MMHG | WEIGHT: 173 LBS | BODY MASS INDEX: 28.82 KG/M2 | HEIGHT: 65 IN | SYSTOLIC BLOOD PRESSURE: 119 MMHG | RESPIRATION RATE: 15 BRPM

## 2024-10-31 DIAGNOSIS — R10.9 ABDOMINAL PAIN, UNSPECIFIED ABDOMINAL LOCATION: ICD-10-CM

## 2024-10-31 DIAGNOSIS — K25.7 CHRONIC GASTRIC ULCER, UNSPECIFIED WHETHER GASTRIC ULCER HEMORRHAGE OR PERFORATION PRESENT: ICD-10-CM

## 2024-10-31 PROCEDURE — 43239 EGD BIOPSY SINGLE/MULTIPLE: CPT | Performed by: INTERNAL MEDICINE

## 2024-10-31 PROCEDURE — 99070 SPECIAL SUPPLIES PHYS/QHP: CPT | Performed by: INTERNAL MEDICINE

## 2024-10-31 RX ORDER — NALOXONE HYDROCHLORIDE 0.4 MG/ML
0.08 INJECTION, SOLUTION INTRAMUSCULAR; INTRAVENOUS; SUBCUTANEOUS ONCE AS NEEDED
OUTPATIENT
Start: 2024-10-31 | End: 2024-10-31

## 2024-10-31 RX ORDER — SODIUM CHLORIDE, SODIUM LACTATE, POTASSIUM CHLORIDE, CALCIUM CHLORIDE 600; 310; 30; 20 MG/100ML; MG/100ML; MG/100ML; MG/100ML
INJECTION, SOLUTION INTRAVENOUS CONTINUOUS
OUTPATIENT
Start: 2024-10-31

## 2024-10-31 RX ORDER — SODIUM CHLORIDE, SODIUM LACTATE, POTASSIUM CHLORIDE, CALCIUM CHLORIDE 600; 310; 30; 20 MG/100ML; MG/100ML; MG/100ML; MG/100ML
INJECTION, SOLUTION INTRAVENOUS CONTINUOUS
Status: DISCONTINUED | OUTPATIENT
Start: 2024-10-31 | End: 2024-10-31

## 2024-10-31 RX ORDER — LIDOCAINE HYDROCHLORIDE 10 MG/ML
INJECTION, SOLUTION EPIDURAL; INFILTRATION; INTRACAUDAL; PERINEURAL AS NEEDED
Status: DISCONTINUED | OUTPATIENT
Start: 2024-10-31 | End: 2024-10-31 | Stop reason: SURG

## 2024-10-31 RX ADMIN — LIDOCAINE HYDROCHLORIDE 40 MG: 10 INJECTION, SOLUTION EPIDURAL; INFILTRATION; INTRACAUDAL; PERINEURAL at 10:36:00

## 2024-10-31 RX ADMIN — SODIUM CHLORIDE, SODIUM LACTATE, POTASSIUM CHLORIDE, CALCIUM CHLORIDE: 600; 310; 30; 20 INJECTION, SOLUTION INTRAVENOUS at 10:51:00

## 2024-10-31 RX ADMIN — SODIUM CHLORIDE, SODIUM LACTATE, POTASSIUM CHLORIDE, CALCIUM CHLORIDE: 600; 310; 30; 20 INJECTION, SOLUTION INTRAVENOUS at 10:00:00

## 2024-10-31 NOTE — ANESTHESIA POSTPROCEDURE EVALUATION
Patient: Mackenzie Nelson    Procedure Summary       Date: 10/31/24 Room / Location: [facility] ENDOSCOPY 01 / [facility] ENDO    Anesthesia Start: 1030 Anesthesia Stop: 1051    Procedure: ESOPHAGOGASTRODUODENOSCOPY  WITH POSSIBLE DILATION Diagnosis:       Chronic gastric ulcer, unspecified whether gastric ulcer hemorrhage or perforation present      Abdominal pain, unspecified abdominal location      (healed gastric ulcers and gastric erythema)    Surgeons: Yrn Das MD Anesthesiologist: Tee Berry MD    Anesthesia Type: MAC ASA Status: 3            Anesthesia Type: MAC    Vitals Value Taken Time   /92 10/31/24 1105   Temp 98 10/31/24 1108   Pulse 49 10/31/24 1107   Resp 13 10/31/24 1107   SpO2 95 % 10/31/24 1107   Vitals shown include unfiled device data.    [facility] AN Post Evaluation:   Patient Evaluated in PACU  Patient Participation: complete - patient participated  Level of Consciousness: awake  Pain Score: 0  Pain Management: adequate  Airway Patency:patent  Dental exam unchanged from preop  Yes    Nausea/Vomiting: none  Cardiovascular Status: acceptable  Respiratory Status: acceptable  Postoperative Hydration acceptable      Tee Berry MD  10/31/2024 11:08 AM

## 2024-10-31 NOTE — OPERATIVE REPORT
ESOPHAGOGASTRODUODENOSCOPY (EGD) REPORT    Mackenzie Nelson     3/9/1955 Age 69 year old   PCP Dipak Nelson MD Endoscopist Yrn Das MD       Date of procedure: 10/31/24    Procedure: EGD w/biopsies    Pre-operative diagnosis: follow up gastric ulcer    Post-operative diagnosis: healed prior ulcer site with deformed antrum biopsied    Medications: MAC sedation    Complications: none    Procedure:  Informed consent was obtained from the patient after the risks of the procedure were discussed, including but not limited to bleeding, perforation, aspiration, infection, or possibility of a missed lesion. After discussions of the risks/benefits and alternatives to this procedure, as well as the planned sedation, the patient was placed in the left lateral decubitus position and begun on continuous blood pressure pulse oximetry and EKG monitoring and this was maintained throughout the procedure. Once an adequate level of sedation was obtained a bite block was placed. Then the lubricated tip of the [id number] diagnostic video upper endoscope was inserted and advanced using direct visualization into the posterior pharynx and ultimately into the esophagus.    Complications: None    Findings:      1. Esophagus: The squamocolumnar junction was noted at 37 cm and appeared regular. The GE junction was noted at 37 cm from the incisors. No significant hiatal hernia. The esophageal mucosa appeared normal. There was no evidence of esophagitis, stricture  2. Stomach: The stomach distended normally. Normal rugal folds were seen. The pylorus was patent. The gastric mucosa appeared erythematous but improved and healed ulcer with deformed antrum biopsied. Retroflexion revealed a normal fundus and a normal cardia.   3. Duodenum: The duodenal mucosa appeared normal in the 1st and 2nd portion of the duodenum.     Impression:  1. Healed ulcer, deformed ulcer    Recommend:  1. Here are some reflux precautions:   - Avoid  trigger foods  - Anti-reflux measures: raising the head of the bed at night, avoiding tight clothing or belts, avoiding eating late at night and not lying down shortly after mealtime and achieving weight loss   - Avoid NSAID's, caffeine, peppermints, alcohol, tobacco and foods that incite symptoms   2. Continue  PPI daily before breakfast  3. Pending biopsies    >>>If tissue was sampled/removed and you have not received your pathology results either by phone or letter within 2 weeks, please call our office at [phone].    Specimens:  Gastric biopsies of deformed antrum

## 2024-10-31 NOTE — ANESTHESIA PREPROCEDURE EVALUATION
Anesthesia PreOp Note    HPI:     Mackenzie Nelson is a 69 year old female who presents for preoperative consultation requested by: Yrn Das MD    Date of Surgery: 2024    Procedure(s):  ESOPHAGOGASTRODUODENOSCOPY  Indication: Chronic gastric ulcer, unspecified whether gastric ulcer hemorrhage or perforation present / Abdominal pain, unspecified abdominal location    Relevant Problems   No relevant active problems       NPO:  Last Liquid Consumption Date: 10/30/24  Last Liquid Consumption Time: 2100  Last Solid Consumption Date: 10/30/24  Last Solid Consumption Time: 1200  Last Liquid Consumption Date: 10/30/24          History Review:  Patient Active Problem List    Diagnosis Date Noted    Chronic gastric ulcer 2024    Gastritis with hemorrhage 2024    Irritable bowel syndrome with diarrhea 2024    Diarrhea of presumed infectious origin 2024    Iron deficiency anemia, unspecified 2023       Past Medical History:    Anxiety state    Depression    Essential hypertension    Gout    High blood pressure    High cholesterol    Renal disorder    kidney transplant 21 years ago    Visual impairment       Past Surgical History:   Procedure Laterality Date          x1    Colonoscopy N/A 2023    Procedure: COLONOSCOPY;  Surgeon: Yrn Das MD;  Location: [facility] ENDOSCOPY    Egd  2024    Dr. Das; incomplete exam, food in stomach    Hysterectomy      Kidney surgery      Kidney transplant    Other surgical history      had surgery on uteters to save kidney       Medications Prior to Admission   Medication Sig Dispense Refill Last Dose/Taking    PANTOPRAZOLE 40 MG Oral Tab EC TAKE 1 TABLET BY MOUTH 2 TIMES DAILY BEFORE MEALS. 180 tablet 2 10/31/2024 Morning    aspirin 81 MG Oral Chew Tab Chew 1 tablet (81 mg total) by mouth daily.   10/30/2024    colchicine 0.6 MG Oral Tab Take 1 tablet (0.6 mg total) by mouth daily.   10/30/2024    Tazarotene 0.1 % External Cream Apply 1  Application topically nightly.   10/30/2024    rosuvastatin 10 MG Oral Tab Take 1 tablet (10 mg total) by mouth daily.   10/30/2024    metoprolol succinate ER 25 MG Oral Tablet 24 Hr Take 1 tablet (25 mg total) by mouth every evening.   10/30/2024    sertraline 100 MG Oral Tab Take 1.5 tablets (150 mg total) by mouth daily.   10/30/2024    cyanocobalamin 1000 MCG/ML Injection Solution Inject 1 mL (1,000 mcg total) into the muscle every 30 (thirty) days.   10/30/2024    Hydroquinone 4 % External Cream Apply 1 Application topically 2 (two) times daily.   10/30/2024    ZILXI 1.5 % External Foam Apply 1 Application topically daily.   10/30/2024    Beclomethasone Diprop HFA (QVAR REDIHALER) 80 MCG/ACT Inhalation Aerosol, Breath Activated 1 puff.   10/30/2024    Cholecalciferol (VITAMIN D3) 125 MCG (5000 UT) Oral Tablet Dispersible as directed Orally   10/30/2024    allopurinol 100 MG Oral Tab Take 1 tablet (100 mg total) by mouth 2 (two) times daily.   10/30/2024    losartan 100 MG Oral Tab Take 1 tablet (100 mg total) by mouth daily.   10/31/2024 Morning    chlorthalidone 25 MG Oral Tab Take 0.5 tablets (12.5 mg total) by mouth daily.   10/30/2024    mycophenolate mofetil 250 MG Oral Cap Take 6 capsules (1,500 mg total) by mouth 2 (two) times daily before meals. 4 in the morning, 3 in the evening   10/30/2024    tacrolimus 1 MG Oral Cap Take 1 capsule (1 mg total) by mouth 2 (two) times daily. Take 4 tables morning  Take 3 tables evening   10/30/2024    traZODone 50 MG Oral Tab Take 1 tablet (50 mg total) by mouth nightly.   10/30/2024    acetaminophen 325 MG Oral Tab Take 1 tablet (325 mg total) by mouth every 6 (six) hours as needed for Pain.   10/30/2024    pravastatin 20 MG Oral Tab Take 1 tablet (20 mg total) by mouth nightly. 30 tablet 1     pravastatin 20 MG Oral Tab Take 1 tablet (20 mg total) by mouth nightly. (Patient not taking: Reported on 6/17/2024) 30 tablet 1     tiZANidine 4 MG Oral Tab Take 1 tablet (4  mg total) by mouth 2 (two) times daily as needed.       tacrolimus 1 MG Oral Cap Take 4 capsules (4 mg total) by mouth every morning.   10/30/2024    pravastatin 20 MG Oral Tab Take 1 tablet (20 mg total) by mouth nightly. (Patient not taking: Reported on 6/5/2024) 30 tablet 3     escitalopram 10 MG Oral Tab Take 1 tablet (10 mg total) by mouth daily. (Patient not taking: Reported on 6/5/2024)       magnesium oxide 400 MG Oral Tab Take 1 tablet (400 mg total) by mouth daily. (Patient taking differently: Take 1 tablet (400 mg total) by mouth every other day.) 30 tablet 3 10/24/2024    sertraline 50 MG Oral Tab Take 2 tablets (100 mg total) by mouth daily. (Patient not taking: Reported on 6/5/2024)        Current Facility-Administered Medications Ordered in Epic   Medication Dose Route Frequency Provider Last Rate Last Admin    lactated ringers infusion   Intravenous Continuous Yrn Das MD         No current [facility]-ordered outpatient medications on file.       Allergies   Allergen Reactions    Dairy Products HIVES and DIARRHEA    Lactase DIARRHEA, RASH and HIVES    Codeine NAUSEA AND VOMITING       Family History   Problem Relation Age of Onset    Diabetes Mother     Hypertension Mother     Other (Other) Brother      Social History     Socioeconomic History    Marital status:    Tobacco Use    Smoking status: Never    Smokeless tobacco: Never   Vaping Use    Vaping status: Never Used   Substance and Sexual Activity    Alcohol use: Not Currently    Drug use: Never       Available pre-op labs reviewed.                 Vital Signs:  Body mass index is 28.79 kg/m².   height is 1.651 m (5' 5\") and weight is 78.5 kg (173 lb). Her blood pressure is 133/73 and her pulse is 59. Her respiration is 11 and oxygen saturation is 97%.   Vitals:    10/29/24 1002 10/31/24 1006   BP:  133/73   Pulse:  59   Resp:  11   SpO2:  97%   Weight: 78.5 kg (173 lb)    Height: 1.651 m (5' 5\")         Anesthesia Evaluation     Patient  summary reviewed and Nursing notes reviewed    Airway   Mallampati: II  TM distance: >3 FB  Neck ROM: full  Dental          Pulmonary - negative ROS   Cardiovascular   Exercise tolerance: good  (+) hypertension    Neuro/Psych      GI/Hepatic/Renal    (+) chronic renal disease    Comments: Kidney transplant pt    Endo/Other - negative ROS   Abdominal                  Anesthesia Plan:   ASA:  3  Plan:   MAC  Plan Comments: I have discussed the anesthetic plan, major risks and alternatives with the patient and answered all questions. The patient desires to proceed with surgery and anesthesia as planned.     Informed Consent Plan and Risks Discussed With:  Patient  Discussed plan with:  Attending      I have informed Mackenzie Nelson and/or legal guardian or family member of the nature of the anesthetic plan, benefits, risks including possible dental damage if relevant, major complications, and any alternative forms of anesthetic management.   All of the patient's questions were answered to the best of my ability. The patient desires the anesthetic management as planned.  AGUSTIN SALGUERO DO  6/20/2024 7:12 AM  Present on Admission:  **None**

## 2024-10-31 NOTE — H&P
Pre Procedure History & Physical Examination    Patient Name: Mackenzie Nelson  MRN: [id number]  CSN: [id number]  YOB: 1955    Diagnosis: history of gastric ulcers and upper abdominal pain      Prescriptions Prior to Admission[1]  Current Facility-Administered Medications   Medication Dose Route Frequency    lactated ringers infusion   Intravenous Continuous       Allergies: Allergies[2]    Past Medical History:    Anxiety state    Depression    Essential hypertension    Gout    High blood pressure    High cholesterol    Renal disorder    kidney transplant 21 years ago    Visual impairment     Past Surgical History:   Procedure Laterality Date          x1    Colonoscopy N/A 2023    Procedure: COLONOSCOPY;  Surgeon: Yrn Das MD;  [facility] ENDOSCOPY    Egd  2024    Dr. Das; incomplete exam, food in stomach    Hysterectomy      Kidney surgery      Kidney transplant    Other surgical history      had surgery on uteters to save kidney     Family History   Problem Relation Age of Onset    Diabetes Mother     Hypertension Mother     Other (Other) Brother      Social History     Tobacco Use    Smoking status: Never    Smokeless tobacco: Never   Substance Use Topics    Alcohol use: Not Currently         ROS:   CONSTITUTIONAL:  negative for fevers, rigors  EYES:  negative for diplopia   RESPIRATORY:  negative for severe shortness of breath  CARDIOVASCULAR:  negative for crushing sub-sternal chest pain  GASTROINTESTINAL:  see HPI  GENITOURINARY:  negative for dysuria or gross hematuria  INTEGUMENT/BREAST:  SKIN:  negative for jaundice   ALLERGIC/IMMUNOLOGIC:  negative for hay fever  ENDOCRINE:  negative for cold intolerance and heat intolerance  MUSCULOSKELETAL:  negative for joint effusion/severe erythema  BEHAVIOR/PSYCH:  negative for psychotic behavior      PHYSICAL EXAM:   Ht 5' 5\" (1.651 m)   Wt 173 lb (78.5 kg)   BMI 28.79 kg/m²       Gen: Patient appears comfortable and in no  acute discomfort  HEENT: the sclera appears anicteric, oropharynx clear, mucus membranes appear moist  CV: regular rate and rhythm, the extremities are warm and well perfused   Lung: Moves air well; No labored breathing  Abdomen: soft, non-tender exam in all quadrants without rigidity or guarding, non-distended, no abnormal bowel sounds noted, no masses are palpated  Skin: No jaundice  Ext: no cyanosis, clubbing or edema is evident.   Neuro: Alert and interactive, and gross movements of extremities normal    I have discussed the risks and benefits and alternatives of the procedure with the patient/family.  They understand and agree to proceed with plan of care.   I have reviewed recent H&P/clinic notes  Yrn Das MD  [facility] - Gastroenterology  10/31/2024  9:59 AM         [1]   Medications Prior to Admission   Medication Sig Dispense Refill Last Dose/Taking    PANTOPRAZOLE 40 MG Oral Tab EC TAKE 1 TABLET BY MOUTH 2 TIMES DAILY BEFORE MEALS. 180 tablet 2 10/31/2024 Morning    aspirin 81 MG Oral Chew Tab Chew 1 tablet (81 mg total) by mouth daily.   10/30/2024    colchicine 0.6 MG Oral Tab Take 1 tablet (0.6 mg total) by mouth daily.   10/30/2024    Tazarotene 0.1 % External Cream Apply 1 Application topically nightly.   10/30/2024    rosuvastatin 10 MG Oral Tab Take 1 tablet (10 mg total) by mouth daily.   10/30/2024    metoprolol succinate ER 25 MG Oral Tablet 24 Hr Take 1 tablet (25 mg total) by mouth every evening.   10/30/2024    sertraline 100 MG Oral Tab Take 1.5 tablets (150 mg total) by mouth daily.   10/30/2024    cyanocobalamin 1000 MCG/ML Injection Solution Inject 1 mL (1,000 mcg total) into the muscle every 30 (thirty) days.   10/30/2024    Hydroquinone 4 % External Cream Apply 1 Application topically 2 (two) times daily.   10/30/2024    ZILXI 1.5 % External Foam Apply 1 Application topically daily.   10/30/2024    Beclomethasone Diprop HFA (QVAR REDIHALER) 80 MCG/ACT Inhalation Aerosol, Breath  Activated 1 puff.   10/30/2024    Cholecalciferol (VITAMIN D3) 125 MCG (5000 UT) Oral Tablet Dispersible as directed Orally   10/30/2024    allopurinol 100 MG Oral Tab Take 1 tablet (100 mg total) by mouth 2 (two) times daily.   10/30/2024    losartan 100 MG Oral Tab Take 1 tablet (100 mg total) by mouth daily.   10/31/2024 Morning    chlorthalidone 25 MG Oral Tab Take 0.5 tablets (12.5 mg total) by mouth daily.   10/30/2024    mycophenolate mofetil 250 MG Oral Cap Take 6 capsules (1,500 mg total) by mouth 2 (two) times daily before meals. 4 in the morning, 3 in the evening   10/30/2024    tacrolimus 1 MG Oral Cap Take 1 capsule (1 mg total) by mouth 2 (two) times daily. Take 4 tables morning  Take 3 tables evening   10/30/2024    traZODone 50 MG Oral Tab Take 1 tablet (50 mg total) by mouth nightly.   10/30/2024    acetaminophen 325 MG Oral Tab Take 1 tablet (325 mg total) by mouth every 6 (six) hours as needed for Pain.   10/30/2024    pravastatin 20 MG Oral Tab Take 1 tablet (20 mg total) by mouth nightly. 30 tablet 1     pravastatin 20 MG Oral Tab Take 1 tablet (20 mg total) by mouth nightly. (Patient not taking: Reported on 6/17/2024) 30 tablet 1     tiZANidine 4 MG Oral Tab Take 1 tablet (4 mg total) by mouth 2 (two) times daily as needed.       tacrolimus 1 MG Oral Cap Take 4 capsules (4 mg total) by mouth every morning.   10/30/2024    pravastatin 20 MG Oral Tab Take 1 tablet (20 mg total) by mouth nightly. (Patient not taking: Reported on 6/5/2024) 30 tablet 3     escitalopram 10 MG Oral Tab Take 1 tablet (10 mg total) by mouth daily. (Patient not taking: Reported on 6/5/2024)       magnesium oxide 400 MG Oral Tab Take 1 tablet (400 mg total) by mouth daily. (Patient taking differently: Take 1 tablet (400 mg total) by mouth every other day.) 30 tablet 3 10/24/2024    sertraline 50 MG Oral Tab Take 2 tablets (100 mg total) by mouth daily. (Patient not taking: Reported on 6/5/2024)      [2]    Allergies  Allergen Reactions    Dairy Products HIVES and DIARRHEA    Lactase DIARRHEA, RASH and HIVES    Codeine NAUSEA AND VOMITING

## 2024-10-31 NOTE — DISCHARGE INSTRUCTIONS
Home Care Instructions for Gastroscopy with Sedation    Diet:  - Resume your regular diet as tolerated unless otherwise instructed.  - Start with light meals to minimize bloating.  - Do not drink alcohol today.    Medication:  - If you have questions about resuming your normal medications, please contact your Primary Care Physician.    Activities:  - Take it easy today. Do not return to work today.  - Do not drive today.  - Do not operate any machinery today (including kitchen equipment).    Gastroscopy:  - You may have a sore throat for 2-3 days following the exam. This is normal. Gargling with warm salt water (1/2 tsp salt to 1 glass warm water) or using throat lozenges will help.  - If you experience any sharp pain in your neck, abdomen or chest, vomiting of blood, oral temperature over 100 degrees Fahrenheit, light-headedness or dizziness, or any other problems, contact your doctor.    **If unable to reach your doctor, please go to the [facility] Emergency Room**    - Your referring physician will receive a full report of your examination.  - If you do not hear from your doctor's office within two weeks of your biopsy, please call them for your results.    You may be able to see your laboratory results in [facility] between 4 and 7 business days.  In some cases, your physician may not have viewed the results before they are released to R[facility].  If you have questions regarding your results contact the physician who ordered the test/exam by phone or via Ryla by choosing \"Ask a Medical Question.\"

## 2024-11-06 ENCOUNTER — TELEPHONE (OUTPATIENT)
Facility: CLINIC | Age: 69
End: 2024-11-06

## 2024-11-06 NOTE — TELEPHONE ENCOUNTER
----- Message from Yrn Das sent at 11/3/2024  8:58 AM CST -----  No Hpylori noted  Healed ulcer  Deformed antrum likely from prior ulcers  Follow up in the office in 6 months

## 2025-02-28 ENCOUNTER — TELEPHONE (OUTPATIENT)
Facility: CLINIC | Age: 70
End: 2025-02-28

## 2025-02-28 ENCOUNTER — TELEPHONE (OUTPATIENT)
Dept: NEPHROLOGY | Facility: CLINIC | Age: 70
End: 2025-02-28

## 2025-02-28 NOTE — TELEPHONE ENCOUNTER
Patient is requesting for blood work orders sent to her home address if possible please follow up

## 2025-03-03 ENCOUNTER — APPOINTMENT (OUTPATIENT)
Dept: CARDIOLOGY | Age: 70
End: 2025-03-03

## 2025-03-03 VITALS
SYSTOLIC BLOOD PRESSURE: 148 MMHG | BODY MASS INDEX: 27.16 KG/M2 | WEIGHT: 163.03 LBS | HEIGHT: 65 IN | OXYGEN SATURATION: 97 % | HEART RATE: 70 BPM | DIASTOLIC BLOOD PRESSURE: 79 MMHG

## 2025-03-03 DIAGNOSIS — I10 PRIMARY HYPERTENSION: ICD-10-CM

## 2025-03-03 DIAGNOSIS — M79.602 LEFT ARM PAIN: ICD-10-CM

## 2025-03-03 DIAGNOSIS — Z94.0 HISTORY OF RENAL TRANSPLANT (CMD): Primary | ICD-10-CM

## 2025-03-03 PROCEDURE — 3077F SYST BP >= 140 MM HG: CPT | Performed by: SPECIALIST

## 2025-03-03 PROCEDURE — 3078F DIAST BP <80 MM HG: CPT | Performed by: SPECIALIST

## 2025-03-03 PROCEDURE — 99214 OFFICE O/P EST MOD 30 MIN: CPT | Performed by: SPECIALIST

## 2025-03-03 SDOH — HEALTH STABILITY: PHYSICAL HEALTH: ON AVERAGE, HOW MANY MINUTES DO YOU ENGAGE IN EXERCISE AT THIS LEVEL?: 0 MIN

## 2025-03-03 SDOH — HEALTH STABILITY: PHYSICAL HEALTH: ON AVERAGE, HOW MANY DAYS PER WEEK DO YOU ENGAGE IN MODERATE TO STRENUOUS EXERCISE (LIKE A BRISK WALK)?: 0 DAYS

## 2025-03-03 ASSESSMENT — PATIENT HEALTH QUESTIONNAIRE - PHQ9
CLINICAL INTERPRETATION OF PHQ2 SCORE: NO FURTHER SCREENING NEEDED
1. LITTLE INTEREST OR PLEASURE IN DOING THINGS: NOT AT ALL
SUM OF ALL RESPONSES TO PHQ9 QUESTIONS 1 AND 2: 0
SUM OF ALL RESPONSES TO PHQ9 QUESTIONS 1 AND 2: 0
2. FEELING DOWN, DEPRESSED OR HOPELESS: NOT AT ALL

## 2025-03-04 ENCOUNTER — TELEPHONE (OUTPATIENT)
Dept: CARDIOLOGY | Age: 70
End: 2025-03-04

## 2025-03-06 ENCOUNTER — APPOINTMENT (OUTPATIENT)
Dept: CARDIOLOGY | Age: 70
End: 2025-03-06

## 2025-03-14 ENCOUNTER — TELEPHONE (OUTPATIENT)
Dept: CARDIOLOGY | Age: 70
End: 2025-03-14

## 2025-03-15 ENCOUNTER — APPOINTMENT (OUTPATIENT)
Dept: CT IMAGING | Age: 70
DRG: 391 | End: 2025-03-15
Attending: STUDENT IN AN ORGANIZED HEALTH CARE EDUCATION/TRAINING PROGRAM

## 2025-03-15 ENCOUNTER — HOSPITAL ENCOUNTER (INPATIENT)
Age: 70
LOS: 2 days | Discharge: HOME | DRG: 391 | End: 2025-03-18
Attending: STUDENT IN AN ORGANIZED HEALTH CARE EDUCATION/TRAINING PROGRAM | Admitting: FAMILY MEDICINE

## 2025-03-15 DIAGNOSIS — Z94.0 HISTORY OF RENAL TRANSPLANT (CMD): ICD-10-CM

## 2025-03-15 DIAGNOSIS — N17.9 ACUTE KIDNEY INJURY (CMD): Primary | ICD-10-CM

## 2025-03-15 DIAGNOSIS — A08.4 VIRAL GASTROENTERITIS: ICD-10-CM

## 2025-03-15 LAB
ALBUMIN SERPL-MCNC: 4 G/DL (ref 3.4–5)
ALBUMIN/GLOB SERPL: 0.9 {RATIO} (ref 1–2.4)
ALP SERPL-CCNC: 91 UNITS/L (ref 45–117)
ALT SERPL-CCNC: 16 UNITS/L
ANION GAP SERPL CALC-SCNC: 11 MMOL/L (ref 7–19)
AST SERPL-CCNC: 16 UNITS/L
BASOPHILS # BLD: 0 K/MCL (ref 0–0.3)
BASOPHILS NFR BLD: 0 %
BILIRUB SERPL-MCNC: 0.4 MG/DL (ref 0.2–1)
BUN SERPL-MCNC: 39 MG/DL (ref 6–20)
BUN/CREAT SERPL: 19 (ref 7–25)
CALCIUM SERPL-MCNC: 9.2 MG/DL (ref 8.4–10.2)
CHLORIDE SERPL-SCNC: 103 MMOL/L (ref 97–110)
CO2 SERPL-SCNC: 23 MMOL/L (ref 21–32)
CREAT SERPL-MCNC: 2.05 MG/DL (ref 0.51–0.95)
DEPRECATED RDW RBC: 38.6 FL (ref 39–50)
EGFRCR SERPLBLD CKD-EPI 2021: 26 ML/MIN/{1.73_M2}
EOSINOPHIL # BLD: 0.1 K/MCL (ref 0–0.5)
EOSINOPHIL NFR BLD: 1 %
ERYTHROCYTE [DISTWIDTH] IN BLOOD: 12.6 % (ref 11–15)
FASTING DURATION TIME PATIENT: ABNORMAL H
FLUAV RNA RESP QL NAA+PROBE: NOT DETECTED
FLUBV RNA RESP QL NAA+PROBE: NOT DETECTED
GLOBULIN SER-MCNC: 4.4 G/DL (ref 2–4)
GLUCOSE SERPL-MCNC: 135 MG/DL (ref 70–99)
HCT VFR BLD CALC: 39.2 % (ref 36–46.5)
HGB BLD-MCNC: 12.6 G/DL (ref 12–15.5)
IMM GRANULOCYTES # BLD AUTO: 0 K/MCL (ref 0–0.2)
IMM GRANULOCYTES # BLD: 0 %
LIPASE SERPL-CCNC: 51 UNITS/L (ref 15–77)
LYMPHOCYTES # BLD: 0.9 K/MCL (ref 1–4)
LYMPHOCYTES NFR BLD: 12 %
MCH RBC QN AUTO: 27.2 PG (ref 26–34)
MCHC RBC AUTO-ENTMCNC: 32.1 G/DL (ref 32–36.5)
MCV RBC AUTO: 84.7 FL (ref 78–100)
MONOCYTES # BLD: 1 K/MCL (ref 0.3–0.9)
MONOCYTES NFR BLD: 13 %
NEUTROPHILS # BLD: 5.5 K/MCL (ref 1.8–7.7)
NEUTROPHILS NFR BLD: 74 %
NRBC BLD MANUAL-RTO: 0 /100 WBC
PLATELET # BLD AUTO: 236 K/MCL (ref 140–450)
POTASSIUM SERPL-SCNC: 3.2 MMOL/L (ref 3.4–5.1)
PROT SERPL-MCNC: 8.4 G/DL (ref 6.4–8.2)
RBC # BLD: 4.63 MIL/MCL (ref 4–5.2)
RSV AG NPH QL IA.RAPID: NOT DETECTED
SARS-COV-2 RNA RESP QL NAA+PROBE: NOT DETECTED
SERVICE CMNT-IMP: NORMAL
SERVICE CMNT-IMP: NORMAL
SODIUM SERPL-SCNC: 134 MMOL/L (ref 135–145)
WBC # BLD: 7.5 K/MCL (ref 4.2–11)

## 2025-03-15 PROCEDURE — 96365 THER/PROPH/DIAG IV INF INIT: CPT

## 2025-03-15 PROCEDURE — 85025 COMPLETE CBC W/AUTO DIFF WBC: CPT | Performed by: STUDENT IN AN ORGANIZED HEALTH CARE EDUCATION/TRAINING PROGRAM

## 2025-03-15 PROCEDURE — 83690 ASSAY OF LIPASE: CPT | Performed by: STUDENT IN AN ORGANIZED HEALTH CARE EDUCATION/TRAINING PROGRAM

## 2025-03-15 PROCEDURE — 99284 EMERGENCY DEPT VISIT MOD MDM: CPT

## 2025-03-15 PROCEDURE — 10004651 HB RX, NO CHARGE ITEM: Performed by: STUDENT IN AN ORGANIZED HEALTH CARE EDUCATION/TRAINING PROGRAM

## 2025-03-15 PROCEDURE — 10002800 HB RX 250 W HCPCS: Performed by: STUDENT IN AN ORGANIZED HEALTH CARE EDUCATION/TRAINING PROGRAM

## 2025-03-15 PROCEDURE — 0241U COVID/FLU/RSV PANEL: CPT | Performed by: STUDENT IN AN ORGANIZED HEALTH CARE EDUCATION/TRAINING PROGRAM

## 2025-03-15 PROCEDURE — 96375 TX/PRO/DX INJ NEW DRUG ADDON: CPT

## 2025-03-15 PROCEDURE — 36415 COLL VENOUS BLD VENIPUNCTURE: CPT | Performed by: STUDENT IN AN ORGANIZED HEALTH CARE EDUCATION/TRAINING PROGRAM

## 2025-03-15 PROCEDURE — 10002807 HB RX 258: Performed by: STUDENT IN AN ORGANIZED HEALTH CARE EDUCATION/TRAINING PROGRAM

## 2025-03-15 PROCEDURE — 10002803 HB RX 637: Performed by: STUDENT IN AN ORGANIZED HEALTH CARE EDUCATION/TRAINING PROGRAM

## 2025-03-15 PROCEDURE — 80053 COMPREHEN METABOLIC PANEL: CPT | Performed by: STUDENT IN AN ORGANIZED HEALTH CARE EDUCATION/TRAINING PROGRAM

## 2025-03-15 PROCEDURE — 96361 HYDRATE IV INFUSION ADD-ON: CPT

## 2025-03-15 RX ORDER — PANTOPRAZOLE SODIUM 40 MG/10ML
40 INJECTION, POWDER, LYOPHILIZED, FOR SOLUTION INTRAVENOUS ONCE
Status: COMPLETED | OUTPATIENT
Start: 2025-03-15 | End: 2025-03-15

## 2025-03-15 RX ORDER — METOCLOPRAMIDE HYDROCHLORIDE 5 MG/ML
10 INJECTION INTRAMUSCULAR; INTRAVENOUS ONCE
Status: COMPLETED | OUTPATIENT
Start: 2025-03-15 | End: 2025-03-15

## 2025-03-15 RX ORDER — ONDANSETRON HYDROCHLORIDE 4 MG/5ML
4 SOLUTION ORAL ONCE
Status: COMPLETED | OUTPATIENT
Start: 2025-03-15 | End: 2025-03-15

## 2025-03-15 RX ORDER — DIPHENHYDRAMINE HYDROCHLORIDE 50 MG/ML
25 INJECTION, SOLUTION INTRAMUSCULAR; INTRAVENOUS ONCE
Status: COMPLETED | OUTPATIENT
Start: 2025-03-15 | End: 2025-03-15

## 2025-03-15 RX ORDER — ACETAMINOPHEN 500 MG
1000 TABLET ORAL ONCE
Status: COMPLETED | OUTPATIENT
Start: 2025-03-15 | End: 2025-03-15

## 2025-03-15 RX ADMIN — ACETAMINOPHEN 1000 MG: 500 TABLET ORAL at 21:09

## 2025-03-15 RX ADMIN — PANTOPRAZOLE SODIUM 40 MG: 40 INJECTION, POWDER, FOR SOLUTION INTRAVENOUS at 23:40

## 2025-03-15 RX ADMIN — SODIUM CHLORIDE 1000 ML: 9 INJECTION, SOLUTION INTRAVENOUS at 21:10

## 2025-03-15 RX ADMIN — DIPHENHYDRAMINE HYDROCHLORIDE 25 MG: 50 INJECTION INTRAMUSCULAR; INTRAVENOUS at 21:19

## 2025-03-15 RX ADMIN — METOCLOPRAMIDE HYDROCHLORIDE 10 MG: 5 INJECTION INTRAMUSCULAR; INTRAVENOUS at 21:19

## 2025-03-15 RX ADMIN — MAGNESIUM SULFATE HEPTAHYDRATE 2 G: 40 INJECTION, SOLUTION INTRAVENOUS at 23:41

## 2025-03-15 RX ADMIN — ONDANSETRON HYDROCHLORIDE 4 MG: 4 SOLUTION ORAL at 21:09

## 2025-03-15 ASSESSMENT — PAIN SCALES - GENERAL
PAINLEVEL_OUTOF10: 0
PAINLEVEL_OUTOF10: 3

## 2025-03-15 ASSESSMENT — ENCOUNTER SYMPTOMS
VOMITING: 1
DIARRHEA: 1
NAUSEA: 1

## 2025-03-16 ENCOUNTER — APPOINTMENT (OUTPATIENT)
Dept: CT IMAGING | Age: 70
DRG: 391 | End: 2025-03-16
Attending: STUDENT IN AN ORGANIZED HEALTH CARE EDUCATION/TRAINING PROGRAM

## 2025-03-16 PROBLEM — N17.9 ACUTE KIDNEY INJURY (CMD): Status: ACTIVE | Noted: 2025-03-16

## 2025-03-16 LAB
ALBUMIN SERPL-MCNC: 3.5 G/DL (ref 3.4–5)
ALBUMIN/GLOB SERPL: 0.9 {RATIO} (ref 1–2.4)
ALP SERPL-CCNC: 85 UNITS/L (ref 45–117)
ALT SERPL-CCNC: 14 UNITS/L
ANION GAP SERPL CALC-SCNC: 10 MMOL/L (ref 7–19)
APPEARANCE UR: CLEAR
AST SERPL-CCNC: 13 UNITS/L
BACTERIA #/AREA URNS HPF: ABNORMAL /HPF
BILIRUB SERPL-MCNC: 0.3 MG/DL (ref 0.2–1)
BILIRUB UR QL STRIP: NEGATIVE
BUN SERPL-MCNC: 33 MG/DL (ref 6–20)
BUN/CREAT SERPL: 19 (ref 7–25)
CALCIUM SERPL-MCNC: 8.8 MG/DL (ref 8.4–10.2)
CHLORIDE SERPL-SCNC: 105 MMOL/L (ref 97–110)
CO2 SERPL-SCNC: 24 MMOL/L (ref 21–32)
COLOR UR: ABNORMAL
CREAT SERPL-MCNC: 1.7 MG/DL (ref 0.51–0.95)
DEPRECATED RDW RBC: 38.2 FL (ref 39–50)
EGFRCR SERPLBLD CKD-EPI 2021: 32 ML/MIN/{1.73_M2}
ERYTHROCYTE [DISTWIDTH] IN BLOOD: 12.5 % (ref 11–15)
FASTING DURATION TIME PATIENT: ABNORMAL H
GLOBULIN SER-MCNC: 4.1 G/DL (ref 2–4)
GLUCOSE SERPL-MCNC: 101 MG/DL (ref 70–99)
GLUCOSE UR STRIP-MCNC: NEGATIVE MG/DL
HCT VFR BLD CALC: 36.1 % (ref 36–46.5)
HGB BLD-MCNC: 11.6 G/DL (ref 12–15.5)
HGB UR QL STRIP: NEGATIVE
HYALINE CASTS #/AREA URNS LPF: ABNORMAL /LPF
KETONES UR STRIP-MCNC: NEGATIVE MG/DL
LEUKOCYTE ESTERASE UR QL STRIP: ABNORMAL
MCH RBC QN AUTO: 27.5 PG (ref 26–34)
MCHC RBC AUTO-ENTMCNC: 32.1 G/DL (ref 32–36.5)
MCV RBC AUTO: 85.5 FL (ref 78–100)
NITRITE UR QL STRIP: NEGATIVE
NRBC BLD MANUAL-RTO: 0 /100 WBC
PH UR STRIP: 5.5 [PH] (ref 5–7)
PLATELET # BLD AUTO: 199 K/MCL (ref 140–450)
POTASSIUM SERPL-SCNC: 3 MMOL/L (ref 3.4–5.1)
POTASSIUM SERPL-SCNC: 3.8 MMOL/L (ref 3.4–5.1)
PROT SERPL-MCNC: 7.6 G/DL (ref 6.4–8.2)
PROT UR STRIP-MCNC: ABNORMAL MG/DL
RBC # BLD: 4.22 MIL/MCL (ref 4–5.2)
RBC #/AREA URNS HPF: ABNORMAL /HPF
SODIUM SERPL-SCNC: 136 MMOL/L (ref 135–145)
SP GR UR STRIP: 1.01 (ref 1–1.03)
SQUAMOUS #/AREA URNS HPF: ABNORMAL /HPF
UROBILINOGEN UR STRIP-MCNC: 0.2 MG/DL
WBC # BLD: 4.9 K/MCL (ref 4.2–11)
WBC #/AREA URNS HPF: ABNORMAL /HPF

## 2025-03-16 PROCEDURE — 80053 COMPREHEN METABOLIC PANEL: CPT | Performed by: INTERNAL MEDICINE

## 2025-03-16 PROCEDURE — 81001 URINALYSIS AUTO W/SCOPE: CPT | Performed by: STUDENT IN AN ORGANIZED HEALTH CARE EDUCATION/TRAINING PROGRAM

## 2025-03-16 PROCEDURE — 10000002 HB ROOM CHARGE MED SURG

## 2025-03-16 PROCEDURE — 10002801 HB RX 250 W/O HCPCS: Performed by: INTERNAL MEDICINE

## 2025-03-16 PROCEDURE — 96375 TX/PRO/DX INJ NEW DRUG ADDON: CPT

## 2025-03-16 PROCEDURE — G0378 HOSPITAL OBSERVATION PER HR: HCPCS

## 2025-03-16 PROCEDURE — 10002803 HB RX 637: Performed by: INTERNAL MEDICINE

## 2025-03-16 PROCEDURE — 84132 ASSAY OF SERUM POTASSIUM: CPT | Performed by: FAMILY MEDICINE

## 2025-03-16 PROCEDURE — 10002800 HB RX 250 W HCPCS: Performed by: STUDENT IN AN ORGANIZED HEALTH CARE EDUCATION/TRAINING PROGRAM

## 2025-03-16 PROCEDURE — 74176 CT ABD & PELVIS W/O CONTRAST: CPT

## 2025-03-16 PROCEDURE — 10002800 HB RX 250 W HCPCS: Performed by: FAMILY MEDICINE

## 2025-03-16 PROCEDURE — 10002803 HB RX 637: Performed by: FAMILY MEDICINE

## 2025-03-16 PROCEDURE — 96372 THER/PROPH/DIAG INJ SC/IM: CPT | Performed by: FAMILY MEDICINE

## 2025-03-16 PROCEDURE — 85027 COMPLETE CBC AUTOMATED: CPT | Performed by: INTERNAL MEDICINE

## 2025-03-16 PROCEDURE — 10004651 HB RX, NO CHARGE ITEM: Performed by: FAMILY MEDICINE

## 2025-03-16 PROCEDURE — 10002807 HB RX 258: Performed by: INTERNAL MEDICINE

## 2025-03-16 PROCEDURE — 36415 COLL VENOUS BLD VENIPUNCTURE: CPT | Performed by: INTERNAL MEDICINE

## 2025-03-16 PROCEDURE — 10002800 HB RX 250 W HCPCS: Performed by: INTERNAL MEDICINE

## 2025-03-16 PROCEDURE — 87086 URINE CULTURE/COLONY COUNT: CPT | Performed by: STUDENT IN AN ORGANIZED HEALTH CARE EDUCATION/TRAINING PROGRAM

## 2025-03-16 RX ORDER — POTASSIUM CHLORIDE 1500 MG/1
40 TABLET, EXTENDED RELEASE ORAL ONCE
Status: COMPLETED | OUTPATIENT
Start: 2025-03-16 | End: 2025-03-16

## 2025-03-16 RX ORDER — ROSUVASTATIN CALCIUM 10 MG/1
10 TABLET, COATED ORAL DAILY
Status: DISCONTINUED | OUTPATIENT
Start: 2025-03-16 | End: 2025-03-16 | Stop reason: CLARIF

## 2025-03-16 RX ORDER — SODIUM CHLORIDE 9 MG/ML
INJECTION, SOLUTION INTRAVENOUS CONTINUOUS
Status: DISCONTINUED | OUTPATIENT
Start: 2025-03-16 | End: 2025-03-16

## 2025-03-16 RX ORDER — ACETAMINOPHEN 325 MG/1
650 TABLET ORAL EVERY 4 HOURS PRN
Status: DISCONTINUED | OUTPATIENT
Start: 2025-03-16 | End: 2025-03-18 | Stop reason: HOSPADM

## 2025-03-16 RX ORDER — HEPARIN SODIUM 5000 [USP'U]/ML
5000 INJECTION, SOLUTION INTRAVENOUS; SUBCUTANEOUS EVERY 8 HOURS SCHEDULED
Status: DISCONTINUED | OUTPATIENT
Start: 2025-03-16 | End: 2025-03-18 | Stop reason: HOSPADM

## 2025-03-16 RX ORDER — MYCOPHENOLATE MOFETIL 500 MG/1
500 TABLET ORAL
Status: DISCONTINUED | OUTPATIENT
Start: 2025-03-16 | End: 2025-03-16

## 2025-03-16 RX ORDER — TRAZODONE HYDROCHLORIDE 50 MG/1
50 TABLET ORAL NIGHTLY PRN
Status: DISCONTINUED | OUTPATIENT
Start: 2025-03-16 | End: 2025-03-18 | Stop reason: HOSPADM

## 2025-03-16 RX ORDER — TACROLIMUS 1 MG/1
4 CAPSULE ORAL
Status: DISCONTINUED | OUTPATIENT
Start: 2025-03-16 | End: 2025-03-16

## 2025-03-16 RX ORDER — POLYETHYLENE GLYCOL 3350 17 G/17G
17 POWDER, FOR SOLUTION ORAL DAILY PRN
Status: DISCONTINUED | OUTPATIENT
Start: 2025-03-16 | End: 2025-03-18 | Stop reason: HOSPADM

## 2025-03-16 RX ORDER — CALCIUM CARBONATE 500 MG/1
500 TABLET, CHEWABLE ORAL EVERY 4 HOURS PRN
Status: DISCONTINUED | OUTPATIENT
Start: 2025-03-16 | End: 2025-03-18 | Stop reason: HOSPADM

## 2025-03-16 RX ORDER — FAMOTIDINE 20 MG/1
20 TABLET, FILM COATED ORAL DAILY
Status: DISCONTINUED | OUTPATIENT
Start: 2025-03-16 | End: 2025-03-16

## 2025-03-16 RX ORDER — TACROLIMUS 1 MG/1
4 CAPSULE ORAL EVERY MORNING
Status: DISCONTINUED | OUTPATIENT
Start: 2025-03-16 | End: 2025-03-18 | Stop reason: HOSPADM

## 2025-03-16 RX ORDER — TACROLIMUS 1 MG/1
3 CAPSULE ORAL EVERY EVENING
Status: DISCONTINUED | OUTPATIENT
Start: 2025-03-16 | End: 2025-03-18 | Stop reason: HOSPADM

## 2025-03-16 RX ORDER — PANTOPRAZOLE SODIUM 40 MG/1
40 TABLET, DELAYED RELEASE ORAL DAILY
Status: DISCONTINUED | OUTPATIENT
Start: 2025-03-16 | End: 2025-03-18 | Stop reason: HOSPADM

## 2025-03-16 RX ORDER — LANOLIN ALCOHOL/MO/W.PET/CERES
400 CREAM (GRAM) TOPICAL DAILY
Status: DISCONTINUED | OUTPATIENT
Start: 2025-03-16 | End: 2025-03-16

## 2025-03-16 RX ORDER — MYCOPHENOLATE MOFETIL 500 MG/1
500 TABLET ORAL
Status: DISCONTINUED | OUTPATIENT
Start: 2025-03-16 | End: 2025-03-18 | Stop reason: HOSPADM

## 2025-03-16 RX ORDER — MYCOPHENOLATE MOFETIL 500 MG/1
500 TABLET ORAL ONCE
Status: COMPLETED | OUTPATIENT
Start: 2025-03-16 | End: 2025-03-16

## 2025-03-16 RX ORDER — SODIUM CHLORIDE, SODIUM LACTATE, POTASSIUM CHLORIDE, CALCIUM CHLORIDE 600; 310; 30; 20 MG/100ML; MG/100ML; MG/100ML; MG/100ML
INJECTION, SOLUTION INTRAVENOUS CONTINUOUS
Status: DISCONTINUED | OUTPATIENT
Start: 2025-03-16 | End: 2025-03-17

## 2025-03-16 RX ORDER — ACETAMINOPHEN 650 MG/1
650 SUPPOSITORY RECTAL EVERY 4 HOURS PRN
Status: DISCONTINUED | OUTPATIENT
Start: 2025-03-16 | End: 2025-03-18 | Stop reason: HOSPADM

## 2025-03-16 RX ORDER — TACROLIMUS 1 MG/1
4 CAPSULE ORAL ONCE
Status: COMPLETED | OUTPATIENT
Start: 2025-03-16 | End: 2025-03-16

## 2025-03-16 RX ORDER — LOSARTAN POTASSIUM 50 MG/1
100 TABLET ORAL DAILY
Status: DISCONTINUED | OUTPATIENT
Start: 2025-03-16 | End: 2025-03-18 | Stop reason: HOSPADM

## 2025-03-16 RX ORDER — 0.9 % SODIUM CHLORIDE 0.9 %
10 VIAL (ML) INJECTION PRN
Status: DISCONTINUED | OUTPATIENT
Start: 2025-03-16 | End: 2025-03-18 | Stop reason: HOSPADM

## 2025-03-16 RX ORDER — METOPROLOL SUCCINATE 25 MG/1
25 TABLET, EXTENDED RELEASE ORAL EVERY EVENING
Status: DISCONTINUED | OUTPATIENT
Start: 2025-03-16 | End: 2025-03-18 | Stop reason: HOSPADM

## 2025-03-16 RX ORDER — TACROLIMUS 1 MG/1
3 CAPSULE ORAL
Status: DISCONTINUED | OUTPATIENT
Start: 2025-03-16 | End: 2025-03-16

## 2025-03-16 RX ORDER — ALLOPURINOL 100 MG/1
100 TABLET ORAL 2 TIMES DAILY
Status: DISCONTINUED | OUTPATIENT
Start: 2025-03-16 | End: 2025-03-18 | Stop reason: HOSPADM

## 2025-03-16 RX ORDER — ONDANSETRON 4 MG/1
4 TABLET, ORALLY DISINTEGRATING ORAL EVERY 6 HOURS PRN
Status: DISCONTINUED | OUTPATIENT
Start: 2025-03-16 | End: 2025-03-18 | Stop reason: HOSPADM

## 2025-03-16 RX ORDER — ONDANSETRON 2 MG/ML
4 INJECTION INTRAMUSCULAR; INTRAVENOUS EVERY 12 HOURS PRN
Status: DISCONTINUED | OUTPATIENT
Start: 2025-03-16 | End: 2025-03-18 | Stop reason: HOSPADM

## 2025-03-16 RX ORDER — 0.9 % SODIUM CHLORIDE 0.9 %
2 VIAL (ML) INJECTION EVERY 12 HOURS SCHEDULED
Status: DISCONTINUED | OUTPATIENT
Start: 2025-03-16 | End: 2025-03-18 | Stop reason: HOSPADM

## 2025-03-16 RX ORDER — SERTRALINE HYDROCHLORIDE 100 MG/1
100 TABLET, FILM COATED ORAL DAILY
Status: DISCONTINUED | OUTPATIENT
Start: 2025-03-16 | End: 2025-03-18 | Stop reason: HOSPADM

## 2025-03-16 RX ORDER — CHLORTHALIDONE 25 MG/1
25 TABLET ORAL DAILY
Status: DISCONTINUED | OUTPATIENT
Start: 2025-03-16 | End: 2025-03-16

## 2025-03-16 RX ORDER — CYCLOBENZAPRINE HCL 5 MG
5 TABLET ORAL 3 TIMES DAILY PRN
Status: DISCONTINUED | OUTPATIENT
Start: 2025-03-16 | End: 2025-03-18 | Stop reason: HOSPADM

## 2025-03-16 RX ADMIN — ACETAMINOPHEN 650 MG: 325 TABLET ORAL at 13:53

## 2025-03-16 RX ADMIN — SODIUM CHLORIDE, POTASSIUM CHLORIDE, SODIUM LACTATE AND CALCIUM CHLORIDE: 600; 310; 30; 20 INJECTION, SOLUTION INTRAVENOUS at 13:54

## 2025-03-16 RX ADMIN — TACROLIMUS 4 MG: 1 CAPSULE ORAL at 10:14

## 2025-03-16 RX ADMIN — HEPARIN SODIUM 5000 UNITS: 5000 INJECTION INTRAVENOUS; SUBCUTANEOUS at 13:48

## 2025-03-16 RX ADMIN — SODIUM CHLORIDE, POTASSIUM CHLORIDE, SODIUM LACTATE AND CALCIUM CHLORIDE: 600; 310; 30; 20 INJECTION, SOLUTION INTRAVENOUS at 02:01

## 2025-03-16 RX ADMIN — SODIUM CHLORIDE, PRESERVATIVE FREE 2 ML: 5 INJECTION INTRAVENOUS at 21:17

## 2025-03-16 RX ADMIN — FAMOTIDINE 20 MG: 20 TABLET, FILM COATED ORAL at 10:14

## 2025-03-16 RX ADMIN — METOPROLOL SUCCINATE 25 MG: 25 TABLET, EXTENDED RELEASE ORAL at 21:17

## 2025-03-16 RX ADMIN — CEFTRIAXONE 1000 MG: 1 INJECTION, POWDER, FOR SOLUTION INTRAMUSCULAR; INTRAVENOUS at 15:38

## 2025-03-16 RX ADMIN — FENTANYL CITRATE 50 MCG: 50 INJECTION INTRAMUSCULAR; INTRAVENOUS at 01:02

## 2025-03-16 RX ADMIN — TRAZODONE HYDROCHLORIDE 50 MG: 50 TABLET ORAL at 23:39

## 2025-03-16 RX ADMIN — MYCOPHENOLATE MOFETIL 500 MG: 500 TABLET, FILM COATED ORAL at 10:19

## 2025-03-16 RX ADMIN — CHLORTHALIDONE 25 MG: 25 TABLET ORAL at 15:30

## 2025-03-16 RX ADMIN — ALLOPURINOL 100 MG: 100 TABLET ORAL at 13:48

## 2025-03-16 RX ADMIN — TACROLIMUS 3 MG: 1 CAPSULE ORAL at 21:17

## 2025-03-16 RX ADMIN — SERTRALINE HYDROCHLORIDE 100 MG: 100 TABLET, FILM COATED ORAL at 13:48

## 2025-03-16 RX ADMIN — POTASSIUM CHLORIDE 40 MEQ: 1500 TABLET, EXTENDED RELEASE ORAL at 18:16

## 2025-03-16 RX ADMIN — PANTOPRAZOLE SODIUM 40 MG: 40 TABLET, DELAYED RELEASE ORAL at 13:48

## 2025-03-16 RX ADMIN — LOSARTAN POTASSIUM 100 MG: 50 TABLET, FILM COATED ORAL at 13:49

## 2025-03-16 RX ADMIN — ALLOPURINOL 100 MG: 100 TABLET ORAL at 21:17

## 2025-03-16 RX ADMIN — MYCOPHENOLATE MOFETIL 500 MG: 500 TABLET, FILM COATED ORAL at 21:17

## 2025-03-16 RX ADMIN — HEPARIN SODIUM 5000 UNITS: 5000 INJECTION INTRAVENOUS; SUBCUTANEOUS at 21:19

## 2025-03-16 RX ADMIN — ACETAMINOPHEN, ASPIRIN, CAFFEINE 1 TABLET: 250; 65; 250 TABLET, FILM COATED ORAL at 18:15

## 2025-03-16 RX ADMIN — POTASSIUM CHLORIDE 40 MEQ: 1500 TABLET, EXTENDED RELEASE ORAL at 08:22

## 2025-03-16 SDOH — ECONOMIC STABILITY: HOUSING INSECURITY: DO YOU HAVE PROBLEMS WITH ANY OF THE FOLLOWING?: NONE OF THE ABOVE

## 2025-03-16 SDOH — ECONOMIC STABILITY: GENERAL

## 2025-03-16 SDOH — ECONOMIC STABILITY: FOOD INSECURITY: WITHIN THE PAST 12 MONTHS, THE FOOD YOU BOUGHT JUST DIDN'T LAST AND YOU DIDN'T HAVE MONEY TO GET MORE.: NEVER TRUE

## 2025-03-16 SDOH — SOCIAL STABILITY: SOCIAL INSECURITY: HOW OFTEN DOES ANYONE, INCLUDING FAMILY AND FRIENDS, PHYSICALLY HURT YOU?: NEVER

## 2025-03-16 SDOH — SOCIAL STABILITY: SOCIAL INSECURITY: HOW OFTEN DOES ANYONE, INCLUDING FAMILY AND FRIENDS, INSULT OR TALK DOWN TO YOU?: NEVER

## 2025-03-16 SDOH — ECONOMIC STABILITY: INCOME INSECURITY: IN THE PAST 12 MONTHS, HAS THE ELECTRIC, GAS, OIL, OR WATER COMPANY THREATENED TO SHUT OFF SERVICE IN YOUR HOME?: NO

## 2025-03-16 SDOH — ECONOMIC STABILITY: HOUSING INSECURITY: WHAT IS YOUR LIVING SITUATION TODAY?: ADULT CHILDREN;SPOUSE

## 2025-03-16 SDOH — HEALTH STABILITY: PHYSICAL HEALTH: DO YOU HAVE DIFFICULTY DRESSING OR BATHING?: NO

## 2025-03-16 SDOH — HEALTH STABILITY: GENERAL
BECAUSE OF A PHYSICAL, MENTAL, OR EMOTIONAL CONDITION, DO YOU HAVE SERIOUS DIFFICULTY CONCENTRATING, REMEMBERING OR MAKING DECISIONS?: NO

## 2025-03-16 SDOH — ECONOMIC STABILITY: HOUSING INSECURITY: WHAT IS YOUR LIVING SITUATION TODAY?: I HAVE A STEADY PLACE TO LIVE

## 2025-03-16 SDOH — SOCIAL STABILITY: SOCIAL INSECURITY: HOW OFTEN DOES ANYONE, INCLUDING FAMILY AND FRIENDS, SCREAM OR CURSE AT YOU?: NEVER

## 2025-03-16 SDOH — HEALTH STABILITY: GENERAL: BECAUSE OF A PHYSICAL, MENTAL, OR EMOTIONAL CONDITION, DO YOU HAVE DIFFICULTY DOING ERRANDS ALONE?: NO

## 2025-03-16 SDOH — SOCIAL STABILITY: SOCIAL NETWORK
HOW OFTEN DO YOU SEE OR TALK TO PEOPLE THAT YOU CARE ABOUT AND FEEL CLOSE TO? (FOR EXAMPLE: TALKING TO FRIENDS ON THE PHONE, VISITING FRIENDS OR FAMILY, GOING TO CHURCH OR CLUB MEETINGS): 5 OR MORE TIMES A WEEK

## 2025-03-16 SDOH — HEALTH STABILITY: PHYSICAL HEALTH: DO YOU HAVE SERIOUS DIFFICULTY WALKING OR CLIMBING STAIRS?: NO

## 2025-03-16 SDOH — SOCIAL STABILITY: SOCIAL INSECURITY: HOW OFTEN DOES ANYONE, INCLUDING FAMILY AND FRIENDS, THREATEN YOU WITH HARM?: NEVER

## 2025-03-16 SDOH — ECONOMIC STABILITY: TRANSPORTATION INSECURITY
IN THE PAST 12 MONTHS, HAS LACK OF RELIABLE TRANSPORTATION KEPT YOU FROM MEDICAL APPOINTMENTS, MEETINGS, WORK OR FROM GETTING THINGS NEEDED FOR DAILY LIVING?: NO

## 2025-03-16 SDOH — ECONOMIC STABILITY: HOUSING INSECURITY: WHAT IS YOUR LIVING SITUATION TODAY?: HOUSE

## 2025-03-16 SDOH — ECONOMIC STABILITY: GENERAL: WOULD YOU LIKE HELP WITH ANY OF THE FOLLOWING NEEDS?: I DON'T WANT HELP WITH ANY OF THESE

## 2025-03-16 SDOH — SOCIAL STABILITY: SOCIAL NETWORK: SUPPORT SYSTEMS: FAMILY MEMBERS

## 2025-03-16 ASSESSMENT — PATIENT HEALTH QUESTIONNAIRE - PHQ9
1. LITTLE INTEREST OR PLEASURE IN DOING THINGS: NOT AT ALL
CLINICAL INTERPRETATION OF PHQ2 SCORE: NO FURTHER SCREENING NEEDED
SUM OF ALL RESPONSES TO PHQ9 QUESTIONS 1 AND 2: 0
SUM OF ALL RESPONSES TO PHQ9 QUESTIONS 1 AND 2: 0
2. FEELING DOWN, DEPRESSED OR HOPELESS: NOT AT ALL
IS PATIENT ABLE TO COMPLETE PHQ2 OR PHQ9: YES

## 2025-03-16 ASSESSMENT — LIFESTYLE VARIABLES
AUDIT-C TOTAL SCORE: 0
HOW OFTEN DO YOU HAVE 6 OR MORE DRINKS ON ONE OCCASION: NEVER
ALCOHOL_USE_STATUS: NO OR LOW RISK WITH VALIDATED TOOL
HOW OFTEN DO YOU HAVE A DRINK CONTAINING ALCOHOL: NEVER
HOW MANY STANDARD DRINKS CONTAINING ALCOHOL DO YOU HAVE ON A TYPICAL DAY: 0,1 OR 2

## 2025-03-16 ASSESSMENT — ACTIVITIES OF DAILY LIVING (ADL)
RECENT_DECLINE_ADL: NO
ADL_SHORT_OF_BREATH: NO
ADL_BEFORE_ADMISSION: INDEPENDENT
ADL_SCORE: 12

## 2025-03-16 ASSESSMENT — ORIENTATION MEMORY CONCENTRATION TEST (OMCT)
OMCT INTERPRETATION: 0-6: NO SIGNIFICANT IMPAIRMENT
OMCT SCORE: 0
SAY THE MONTHS IN REVERSE ORDER STARTING WITH LAST MONTH: CORRECT
WHAT MONTH IS IT NOW: CORRECT
COUNT BACKWARDS FROM 20 TO 1: CORRECT
REPEAT THE NAME AND ADDRESS I ASKED YOU TO REMEMBER: CORRECT
WHAT YEAR IS IT NOW (MUST BE EXACT): CORRECT
WHAT TIME IS IT (NO WATCH OR CLOCK): CORRECT

## 2025-03-16 ASSESSMENT — PAIN SCALES - GENERAL
PAINLEVEL_OUTOF10: 5
PAINLEVEL_OUTOF10: 0
PAINLEVEL_OUTOF10: 7
PAINLEVEL_OUTOF10: 6
PAINLEVEL_OUTOF10: 3

## 2025-03-16 ASSESSMENT — ENCOUNTER SYMPTOMS
CONSTITUTIONAL NEGATIVE: 1
DIARRHEA: 1
NAUSEA: 1
NEUROLOGICAL NEGATIVE: 1
VOMITING: 1

## 2025-03-16 ASSESSMENT — COLUMBIA-SUICIDE SEVERITY RATING SCALE - C-SSRS: IS THE PATIENT ABLE TO COMPLETE C-SSRS: YES

## 2025-03-17 ENCOUNTER — APPOINTMENT (OUTPATIENT)
Dept: ULTRASOUND IMAGING | Age: 70
DRG: 391 | End: 2025-03-17
Attending: INTERNAL MEDICINE

## 2025-03-17 LAB
ALBUMIN SERPL-MCNC: 3.3 G/DL (ref 3.4–5)
ALBUMIN/GLOB SERPL: 0.9 {RATIO} (ref 1–2.4)
ALP SERPL-CCNC: 80 UNITS/L (ref 45–117)
ALT SERPL-CCNC: 13 UNITS/L
ANION GAP SERPL CALC-SCNC: 7 MMOL/L (ref 7–19)
AST SERPL-CCNC: 15 UNITS/L
BACTERIA UR CULT: NORMAL
BASOPHILS # BLD: 0 K/MCL (ref 0–0.3)
BASOPHILS NFR BLD: 0 %
BILIRUB SERPL-MCNC: 0.4 MG/DL (ref 0.2–1)
BUN SERPL-MCNC: 22 MG/DL (ref 6–20)
BUN/CREAT SERPL: 17 (ref 7–25)
CALCIUM SERPL-MCNC: 9 MG/DL (ref 8.4–10.2)
CHLORIDE SERPL-SCNC: 105 MMOL/L (ref 97–110)
CO2 SERPL-SCNC: 28 MMOL/L (ref 21–32)
CREAT SERPL-MCNC: 1.29 MG/DL (ref 0.51–0.95)
CREAT UR-MCNC: 51.1 MG/DL
DEPRECATED RDW RBC: 38.7 FL (ref 39–50)
EGFRCR SERPLBLD CKD-EPI 2021: 45 ML/MIN/{1.73_M2}
EOSINOPHIL # BLD: 0.1 K/MCL (ref 0–0.5)
EOSINOPHIL NFR BLD: 2 %
ERYTHROCYTE [DISTWIDTH] IN BLOOD: 12.5 % (ref 11–15)
FASTING DURATION TIME PATIENT: ABNORMAL H
GLOBULIN SER-MCNC: 3.8 G/DL (ref 2–4)
GLUCOSE SERPL-MCNC: 79 MG/DL (ref 70–99)
HCT VFR BLD CALC: 36.1 % (ref 36–46.5)
HGB BLD-MCNC: 11.6 G/DL (ref 12–15.5)
IMM GRANULOCYTES # BLD AUTO: 0 K/MCL (ref 0–0.2)
IMM GRANULOCYTES # BLD: 0 %
LYMPHOCYTES # BLD: 3.9 K/MCL (ref 1–4)
LYMPHOCYTES NFR BLD: 58 %
MAGNESIUM SERPL-MCNC: 1.1 MG/DL (ref 1.7–2.4)
MAGNESIUM SERPL-MCNC: 2.3 MG/DL (ref 1.7–2.4)
MCH RBC QN AUTO: 27.4 PG (ref 26–34)
MCHC RBC AUTO-ENTMCNC: 32.1 G/DL (ref 32–36.5)
MCV RBC AUTO: 85.3 FL (ref 78–100)
MONOCYTES # BLD: 0.6 K/MCL (ref 0.3–0.9)
MONOCYTES NFR BLD: 9 %
NEUTROPHILS # BLD: 2 K/MCL (ref 1.8–7.7)
NEUTROPHILS NFR BLD: 31 %
NRBC BLD MANUAL-RTO: 0 /100 WBC
PHOSPHATE SERPL-MCNC: 2.3 MG/DL (ref 2.4–4.7)
PLATELET # BLD AUTO: 194 K/MCL (ref 140–450)
POTASSIUM SERPL-SCNC: 3.2 MMOL/L (ref 3.4–5.1)
POTASSIUM SERPL-SCNC: 4.1 MMOL/L (ref 3.4–5.1)
PROT SERPL-MCNC: 7.1 G/DL (ref 6.4–8.2)
PROT UR-MCNC: 41 MG/DL
RBC # BLD: 4.23 MIL/MCL (ref 4–5.2)
SODIUM SERPL-SCNC: 137 MMOL/L (ref 135–145)
TACROLIMUS BLD-MCNC: 13.9 NG/ML (ref 5–20)
WBC # BLD: 6.6 K/MCL (ref 4.2–11)

## 2025-03-17 PROCEDURE — 10002800 HB RX 250 W HCPCS: Performed by: FAMILY MEDICINE

## 2025-03-17 PROCEDURE — 96372 THER/PROPH/DIAG INJ SC/IM: CPT | Performed by: FAMILY MEDICINE

## 2025-03-17 PROCEDURE — 36415 COLL VENOUS BLD VENIPUNCTURE: CPT | Performed by: FAMILY MEDICINE

## 2025-03-17 PROCEDURE — 83735 ASSAY OF MAGNESIUM: CPT | Performed by: INTERNAL MEDICINE

## 2025-03-17 PROCEDURE — 10002803 HB RX 637: Performed by: INTERNAL MEDICINE

## 2025-03-17 PROCEDURE — 83735 ASSAY OF MAGNESIUM: CPT | Performed by: FAMILY MEDICINE

## 2025-03-17 PROCEDURE — 84132 ASSAY OF SERUM POTASSIUM: CPT | Performed by: INTERNAL MEDICINE

## 2025-03-17 PROCEDURE — 10004651 HB RX, NO CHARGE ITEM: Performed by: FAMILY MEDICINE

## 2025-03-17 PROCEDURE — 10002800 HB RX 250 W HCPCS: Performed by: INTERNAL MEDICINE

## 2025-03-17 PROCEDURE — 80053 COMPREHEN METABOLIC PANEL: CPT | Performed by: FAMILY MEDICINE

## 2025-03-17 PROCEDURE — 10002803 HB RX 637: Performed by: FAMILY MEDICINE

## 2025-03-17 PROCEDURE — 80197 ASSAY OF TACROLIMUS: CPT | Performed by: INTERNAL MEDICINE

## 2025-03-17 PROCEDURE — 10000002 HB ROOM CHARGE MED SURG

## 2025-03-17 PROCEDURE — 84100 ASSAY OF PHOSPHORUS: CPT | Performed by: INTERNAL MEDICINE

## 2025-03-17 PROCEDURE — 82570 ASSAY OF URINE CREATININE: CPT | Performed by: INTERNAL MEDICINE

## 2025-03-17 PROCEDURE — 10002807 HB RX 258: Performed by: INTERNAL MEDICINE

## 2025-03-17 PROCEDURE — 85025 COMPLETE CBC W/AUTO DIFF WBC: CPT | Performed by: FAMILY MEDICINE

## 2025-03-17 PROCEDURE — 84156 ASSAY OF PROTEIN URINE: CPT | Performed by: INTERNAL MEDICINE

## 2025-03-17 RX ORDER — MAGNESIUM SULFATE 4 G/50ML
4 INJECTION INTRAVENOUS ONCE
Status: COMPLETED | OUTPATIENT
Start: 2025-03-17 | End: 2025-03-17

## 2025-03-17 RX ORDER — CHLORTHALIDONE 25 MG/1
12.5 TABLET ORAL DAILY
Status: DISCONTINUED | OUTPATIENT
Start: 2025-03-17 | End: 2025-03-18 | Stop reason: HOSPADM

## 2025-03-17 RX ORDER — POTASSIUM CHLORIDE 1500 MG/1
40 TABLET, EXTENDED RELEASE ORAL ONCE
Status: COMPLETED | OUTPATIENT
Start: 2025-03-17 | End: 2025-03-17

## 2025-03-17 RX ADMIN — ALLOPURINOL 100 MG: 100 TABLET ORAL at 20:16

## 2025-03-17 RX ADMIN — SERTRALINE HYDROCHLORIDE 100 MG: 100 TABLET, FILM COATED ORAL at 08:49

## 2025-03-17 RX ADMIN — MYCOPHENOLATE MOFETIL 500 MG: 500 TABLET, FILM COATED ORAL at 20:16

## 2025-03-17 RX ADMIN — SODIUM CHLORIDE, PRESERVATIVE FREE 2 ML: 5 INJECTION INTRAVENOUS at 20:17

## 2025-03-17 RX ADMIN — MYCOPHENOLATE MOFETIL 500 MG: 500 TABLET, FILM COATED ORAL at 08:49

## 2025-03-17 RX ADMIN — HEPARIN SODIUM 5000 UNITS: 5000 INJECTION INTRAVENOUS; SUBCUTANEOUS at 06:24

## 2025-03-17 RX ADMIN — POTASSIUM CHLORIDE 40 MEQ: 1500 TABLET, EXTENDED RELEASE ORAL at 08:48

## 2025-03-17 RX ADMIN — LOSARTAN POTASSIUM 100 MG: 50 TABLET, FILM COATED ORAL at 10:15

## 2025-03-17 RX ADMIN — SODIUM CHLORIDE, POTASSIUM CHLORIDE, SODIUM LACTATE AND CALCIUM CHLORIDE: 600; 310; 30; 20 INJECTION, SOLUTION INTRAVENOUS at 12:35

## 2025-03-17 RX ADMIN — PANTOPRAZOLE SODIUM 40 MG: 40 TABLET, DELAYED RELEASE ORAL at 08:49

## 2025-03-17 RX ADMIN — ALLOPURINOL 100 MG: 100 TABLET ORAL at 08:49

## 2025-03-17 RX ADMIN — SODIUM CHLORIDE, POTASSIUM CHLORIDE, SODIUM LACTATE AND CALCIUM CHLORIDE: 600; 310; 30; 20 INJECTION, SOLUTION INTRAVENOUS at 00:42

## 2025-03-17 RX ADMIN — ACETAMINOPHEN, ASPIRIN, CAFFEINE 1 TABLET: 250; 65; 250 TABLET, FILM COATED ORAL at 09:28

## 2025-03-17 RX ADMIN — ONDANSETRON 4 MG: 4 TABLET, ORALLY DISINTEGRATING ORAL at 18:30

## 2025-03-17 RX ADMIN — MAGNESIUM SULFATE HEPTAHYDRATE 4 G: 80 INJECTION, SOLUTION INTRAVENOUS at 09:05

## 2025-03-17 RX ADMIN — Medication 250 MG: at 13:30

## 2025-03-17 RX ADMIN — ACETAMINOPHEN 650 MG: 325 TABLET ORAL at 16:55

## 2025-03-17 RX ADMIN — HEPARIN SODIUM 5000 UNITS: 5000 INJECTION INTRAVENOUS; SUBCUTANEOUS at 13:30

## 2025-03-17 RX ADMIN — CHLORTHALIDONE 12.5 MG: 25 TABLET ORAL at 16:43

## 2025-03-17 RX ADMIN — TACROLIMUS 4 MG: 1 CAPSULE ORAL at 08:48

## 2025-03-17 RX ADMIN — HEPARIN SODIUM 5000 UNITS: 5000 INJECTION INTRAVENOUS; SUBCUTANEOUS at 21:43

## 2025-03-17 ASSESSMENT — PAIN SCALES - GENERAL
PAINLEVEL_OUTOF10: 4

## 2025-03-17 ASSESSMENT — PAIN SCALES - WONG BAKER
WONGBAKER_NUMERICALRESPONSE: 4
WONGBAKER_NUMERICALRESPONSE: 5
WONGBAKER_NUMERICALRESPONSE: 3

## 2025-03-18 VITALS
SYSTOLIC BLOOD PRESSURE: 153 MMHG | OXYGEN SATURATION: 93 % | WEIGHT: 157.41 LBS | RESPIRATION RATE: 16 BRPM | TEMPERATURE: 98.4 F | DIASTOLIC BLOOD PRESSURE: 77 MMHG | BODY MASS INDEX: 26.23 KG/M2 | HEART RATE: 69 BPM | HEIGHT: 65 IN

## 2025-03-18 LAB
ANION GAP SERPL CALC-SCNC: 14 MMOL/L (ref 7–19)
BUN SERPL-MCNC: 19 MG/DL (ref 6–20)
BUN/CREAT SERPL: 14 (ref 7–25)
CALCIUM SERPL-MCNC: 9.5 MG/DL (ref 8.4–10.2)
CHLORIDE SERPL-SCNC: 101 MMOL/L (ref 97–110)
CO2 SERPL-SCNC: 24 MMOL/L (ref 21–32)
CREAT SERPL-MCNC: 1.4 MG/DL (ref 0.51–0.95)
EGFRCR SERPLBLD CKD-EPI 2021: 41 ML/MIN/{1.73_M2}
FASTING DURATION TIME PATIENT: ABNORMAL H
GLUCOSE SERPL-MCNC: 80 MG/DL (ref 70–99)
PHOSPHATE SERPL-MCNC: 3.2 MG/DL (ref 2.4–4.7)
POTASSIUM SERPL-SCNC: 3.8 MMOL/L (ref 3.4–5.1)
RAINBOW EXTRA TUBES HOLD SPECIMEN: NORMAL
SODIUM SERPL-SCNC: 135 MMOL/L (ref 135–145)
TACROLIMUS BLD-MCNC: 13.3 NG/ML (ref 5–20)

## 2025-03-18 PROCEDURE — 84100 ASSAY OF PHOSPHORUS: CPT | Performed by: INTERNAL MEDICINE

## 2025-03-18 PROCEDURE — 96372 THER/PROPH/DIAG INJ SC/IM: CPT | Performed by: FAMILY MEDICINE

## 2025-03-18 PROCEDURE — 80197 ASSAY OF TACROLIMUS: CPT | Performed by: INTERNAL MEDICINE

## 2025-03-18 PROCEDURE — 10002800 HB RX 250 W HCPCS: Performed by: FAMILY MEDICINE

## 2025-03-18 PROCEDURE — 80048 BASIC METABOLIC PNL TOTAL CA: CPT | Performed by: INTERNAL MEDICINE

## 2025-03-18 PROCEDURE — 10002019 HB COUNTER RESP ASSESSMENT

## 2025-03-18 PROCEDURE — 10004651 HB RX, NO CHARGE ITEM: Performed by: FAMILY MEDICINE

## 2025-03-18 PROCEDURE — 10002803 HB RX 637: Performed by: INTERNAL MEDICINE

## 2025-03-18 PROCEDURE — 36415 COLL VENOUS BLD VENIPUNCTURE: CPT | Performed by: INTERNAL MEDICINE

## 2025-03-18 PROCEDURE — 10004180 HB COUNTER-TRANSPORT

## 2025-03-18 RX ADMIN — LOSARTAN POTASSIUM 100 MG: 50 TABLET, FILM COATED ORAL at 09:25

## 2025-03-18 RX ADMIN — HEPARIN SODIUM 5000 UNITS: 5000 INJECTION INTRAVENOUS; SUBCUTANEOUS at 05:48

## 2025-03-18 RX ADMIN — METOPROLOL SUCCINATE 25 MG: 25 TABLET, EXTENDED RELEASE ORAL at 17:28

## 2025-03-18 RX ADMIN — PANTOPRAZOLE SODIUM 40 MG: 40 TABLET, DELAYED RELEASE ORAL at 08:49

## 2025-03-18 RX ADMIN — SODIUM CHLORIDE, PRESERVATIVE FREE 2 ML: 5 INJECTION INTRAVENOUS at 08:54

## 2025-03-18 RX ADMIN — ALLOPURINOL 100 MG: 100 TABLET ORAL at 08:49

## 2025-03-18 RX ADMIN — SERTRALINE HYDROCHLORIDE 100 MG: 100 TABLET, FILM COATED ORAL at 08:49

## 2025-03-18 RX ADMIN — CHLORTHALIDONE 12.5 MG: 25 TABLET ORAL at 08:49

## 2025-03-18 RX ADMIN — MYCOPHENOLATE MOFETIL 500 MG: 500 TABLET, FILM COATED ORAL at 08:49

## 2025-03-18 RX ADMIN — HEPARIN SODIUM 5000 UNITS: 5000 INJECTION INTRAVENOUS; SUBCUTANEOUS at 13:54

## 2025-03-18 ASSESSMENT — PAIN SCALES - WONG BAKER
WONGBAKER_NUMERICALRESPONSE: 3
WONGBAKER_NUMERICALRESPONSE: 3

## 2025-03-18 ASSESSMENT — PAIN SCALES - GENERAL
PAINLEVEL_OUTOF10: 3
PAINLEVEL_OUTOF10: 3

## 2025-03-18 ASSESSMENT — PULMONARY FUNCTION TESTS: FEV1/FVC: UNABLE TO OBTAIN, OR GREATER THAN 70%

## 2025-03-19 ENCOUNTER — TELEPHONE (OUTPATIENT)
Facility: CLINIC | Age: 70
End: 2025-03-19

## 2025-03-19 NOTE — TELEPHONE ENCOUNTER
Patient requesting to speak with Nephrologist - patient states she was admitted to [facility] and they had Tacrolimus due to her number is too high at 15.  Please call.  Thank you.

## 2025-03-20 ENCOUNTER — TELEPHONE (OUTPATIENT)
Dept: CARE COORDINATION | Age: 70
End: 2025-03-20

## 2025-03-20 RX ORDER — MYCOPHENOLATE MOFETIL 500 MG/1
500 TABLET ORAL 2 TIMES DAILY
COMMUNITY
Start: 2025-03-11

## 2025-03-20 NOTE — TELEPHONE ENCOUNTER
Patient states Tacrolimus labs is high- available in [facility]    Patient admitted 3/15/25 to [facility]- (5 days) for: diarrhea and vomiting and sever headache    LOV: 9/23/24 Encounter for aftercare following kidney transplant  RTC: 3 months and labs (around 12/23/24) Patient has not completed any lab ordered by Dr. Falcon   Appt: 12/9/24- patient canceled/personal   Appt: 12/17/24- patient canceled/personal    Patient has not completed any labs ordered by Dr. Falcon   -Appointment: 6/3/25  -Forwarding message to Dr. Falcon, patient has not kept her appointments and lab orders as recommended by Dr. Falcon  -Medication list updated

## 2025-03-20 NOTE — TELEPHONE ENCOUNTER
Spoke to her  During her hosp stay tac were high - 13 and her tac was held  She will do labs [place] morning and then resume her meds ( been approx 3-4 days that she has been off of it)   tac levels 4-6 range   Dose tacrolimus 4 mg in am and 3 mg in pm   B/l cr 1.2-1.5 mg/dl     She will resume same dose after her blood work   Please let me know her tac level and cr

## 2025-03-21 ENCOUNTER — LAB ENCOUNTER (OUTPATIENT)
Dept: LAB | Age: 70
End: 2025-03-21
Attending: INTERNAL MEDICINE
Payer: COMMERCIAL

## 2025-03-21 ENCOUNTER — TELEPHONE (OUTPATIENT)
Dept: CARE COORDINATION | Age: 70
End: 2025-03-21

## 2025-03-21 DIAGNOSIS — Z48.22 ENCOUNTER FOR AFTERCARE FOLLOWING KIDNEY TRANSPLANT (HCC): ICD-10-CM

## 2025-03-21 DIAGNOSIS — N18.30 STAGE 3 CHRONIC KIDNEY DISEASE, UNSPECIFIED WHETHER STAGE 3A OR 3B CKD (HCC): ICD-10-CM

## 2025-03-21 LAB
ALBUMIN SERPL-MCNC: 4.6 G/DL (ref 3.2–4.8)
ALBUMIN/GLOB SERPL: 1.5 {RATIO} (ref 1–2)
ALP LIVER SERPL-CCNC: 89 U/L
ALT SERPL-CCNC: 39 U/L
ANION GAP SERPL CALC-SCNC: 9 MMOL/L (ref 0–18)
AST SERPL-CCNC: 53 U/L (ref ?–34)
BASOPHILS # BLD AUTO: 0.05 X10(3) UL (ref 0–0.2)
BASOPHILS NFR BLD AUTO: 0.7 %
BILIRUB DIRECT SERPL-MCNC: 0.1 MG/DL (ref ?–0.3)
BILIRUB SERPL-MCNC: 0.4 MG/DL (ref 0.2–1.1)
BILIRUB UR QL: NEGATIVE
BUN BLD-MCNC: 34 MG/DL (ref 9–23)
BUN/CREAT SERPL: 19.5 (ref 10–20)
CALCIUM BLD-MCNC: 9.5 MG/DL (ref 8.7–10.4)
CHLORIDE SERPL-SCNC: 96 MMOL/L (ref 98–112)
CHOLEST SERPL-MCNC: 157 MG/DL (ref ?–200)
CK SERPL-CCNC: 53 U/L
CLARITY UR: CLEAR
CO2 SERPL-SCNC: 24 MMOL/L (ref 21–32)
CREAT BLD-MCNC: 1.74 MG/DL
CREAT UR-SCNC: 85.2 MG/DL
DEPRECATED RDW RBC AUTO: 35.5 FL (ref 35.1–46.3)
EGFRCR SERPLBLD CKD-EPI 2021: 31 ML/MIN/1.73M2 (ref 60–?)
EOSINOPHIL # BLD AUTO: 0.37 X10(3) UL (ref 0–0.7)
EOSINOPHIL NFR BLD AUTO: 5.1 %
ERYTHROCYTE [DISTWIDTH] IN BLOOD BY AUTOMATED COUNT: 12 % (ref 11–15)
EST. AVERAGE GLUCOSE BLD GHB EST-MCNC: 117 MG/DL (ref 68–126)
FASTING PATIENT LIPID ANSWER: YES
FASTING STATUS PATIENT QL REPORTED: YES
GLOBULIN PLAS-MCNC: 3.1 G/DL (ref 2–3.5)
GLUCOSE BLD-MCNC: 102 MG/DL (ref 70–99)
GLUCOSE UR-MCNC: NORMAL MG/DL
HBA1C MFR BLD: 5.7 % (ref ?–5.7)
HCT VFR BLD AUTO: 37.7 %
HDLC SERPL-MCNC: 29 MG/DL (ref 40–59)
HGB BLD-MCNC: 12.7 G/DL
HGB UR QL STRIP.AUTO: NEGATIVE
IMM GRANULOCYTES # BLD AUTO: 0.03 X10(3) UL (ref 0–1)
IMM GRANULOCYTES NFR BLD: 0.4 %
KETONES UR-MCNC: NEGATIVE MG/DL
LDLC SERPL CALC-MCNC: 78 MG/DL (ref ?–100)
LEUKOCYTE ESTERASE UR QL STRIP.AUTO: NEGATIVE
LYMPHOCYTES # BLD AUTO: 2.33 X10(3) UL (ref 1–4)
LYMPHOCYTES NFR BLD AUTO: 32.3 %
MAGNESIUM SERPL-MCNC: 1.2 MG/DL (ref 1.6–2.6)
MCH RBC QN AUTO: 27.4 PG (ref 26–34)
MCHC RBC AUTO-ENTMCNC: 33.7 G/DL (ref 31–37)
MCV RBC AUTO: 81.3 FL
MONOCYTES # BLD AUTO: 0.67 X10(3) UL (ref 0.1–1)
MONOCYTES NFR BLD AUTO: 9.3 %
NEUTROPHILS # BLD AUTO: 3.77 X10 (3) UL (ref 1.5–7.7)
NEUTROPHILS # BLD AUTO: 3.77 X10(3) UL (ref 1.5–7.7)
NEUTROPHILS NFR BLD AUTO: 52.2 %
NITRITE UR QL STRIP.AUTO: NEGATIVE
NONHDLC SERPL-MCNC: 128 MG/DL (ref ?–130)
OSMOLALITY SERPL CALC.SUM OF ELEC: 276 MOSM/KG (ref 275–295)
PH UR: 5.5 [PH] (ref 5–8)
PHOSPHATE SERPL-MCNC: 3.6 MG/DL (ref 2.4–5.1)
PLATELET # BLD AUTO: 245 10(3)UL (ref 150–450)
POTASSIUM SERPL-SCNC: 3.8 MMOL/L (ref 3.5–5.1)
PROT SERPL-MCNC: 7.7 G/DL (ref 5.7–8.2)
PROT UR-MCNC: <6 MG/DL (ref ?–14)
PROT UR-MCNC: NEGATIVE MG/DL
PTH-INTACT SERPL-MCNC: 84 PG/ML (ref 18.5–88)
RBC # BLD AUTO: 4.64 X10(6)UL
SODIUM SERPL-SCNC: 129 MMOL/L (ref 136–145)
SP GR UR STRIP: 1.01 (ref 1–1.03)
TRIGL SERPL-MCNC: 306 MG/DL (ref 30–149)
UROBILINOGEN UR STRIP-ACNC: NORMAL
VLDLC SERPL CALC-MCNC: 48 MG/DL (ref 0–30)
WBC # BLD AUTO: 7.2 X10(3) UL (ref 4–11)

## 2025-03-21 PROCEDURE — 84156 ASSAY OF PROTEIN URINE: CPT

## 2025-03-21 PROCEDURE — 82570 ASSAY OF URINE CREATININE: CPT

## 2025-03-21 PROCEDURE — 82550 ASSAY OF CK (CPK): CPT | Performed by: INTERNAL MEDICINE

## 2025-03-21 PROCEDURE — 80061 LIPID PANEL: CPT

## 2025-03-21 PROCEDURE — 80197 ASSAY OF TACROLIMUS: CPT

## 2025-03-21 PROCEDURE — 80053 COMPREHEN METABOLIC PANEL: CPT

## 2025-03-21 PROCEDURE — 82248 BILIRUBIN DIRECT: CPT | Performed by: INTERNAL MEDICINE

## 2025-03-21 PROCEDURE — 83036 HEMOGLOBIN GLYCOSYLATED A1C: CPT

## 2025-03-21 PROCEDURE — 83970 ASSAY OF PARATHORMONE: CPT

## 2025-03-21 PROCEDURE — 81003 URINALYSIS AUTO W/O SCOPE: CPT | Performed by: INTERNAL MEDICINE

## 2025-03-21 PROCEDURE — 83735 ASSAY OF MAGNESIUM: CPT

## 2025-03-21 PROCEDURE — 85025 COMPLETE CBC W/AUTO DIFF WBC: CPT | Performed by: INTERNAL MEDICINE

## 2025-03-21 PROCEDURE — 36415 COLL VENOUS BLD VENIPUNCTURE: CPT

## 2025-03-21 PROCEDURE — 84100 ASSAY OF PHOSPHORUS: CPT | Performed by: INTERNAL MEDICINE

## 2025-03-21 NOTE — TELEPHONE ENCOUNTER
Dr Falcon patient lab results are in epic  pending Tacrolimus.       ollected 3/21/2025  8:52 AM       Status: Final result       Dx: Stage 3 chronic kidney disease, unspe...    0 Result Notes       1  Topic      Component  Ref Range & Units 3/21/25  8:52 AM   Glucose  70 - 99 mg/dL 102 High    Sodium  136 - 145 mmol/L 129 Low    Potassium  3.5 - 5.1 mmol/L 3.8   Chloride  98 - 112 mmol/L 96 Low    CO2  21.0 - 32.0 mmol/L 24.0   Anion Gap  0 - 18 mmol/L 9   BUN  9 - 23 mg/dL 34 High    Creatinine  0.55 - 1.02 mg/dL 1.74 High    BUN/CREA Ratio  10.0 - 20.0 19.5   Calcium, Total  8.7 - 10.4 mg/dL 9.5   Calculated Osmolality  275 - 295 mOsm/kg 276   eGFR-Cr  >=60 mL/min/1.73m2 31 Low    ALT  10 - 49 U/L 39   AST  <34 U/L 53 High    Alkaline Phosphatase  55 - 142 U/L 89   Bilirubin, Total  0.2 - 1.1 mg/dL 0.4   Total Protein  5.7 - 8.2 g/dL 7.7   Albumin  3.2 - 4.8 g/dL 4.6   Globulin  2.0 - 3.5 g/dL 3.1   A/G Ratio  1.0 - 2.0 1.5   Patient Fasting for CMP? Yes   Resulting Agency [facility]

## 2025-03-21 NOTE — TELEPHONE ENCOUNTER
Rn spoke to patient  and last name verified that Dr Falcon reviewed patient lab results ,pt Hemoglobin is normal ,urine test is negative,no protein in urine ,,kidney function creatinine is still elevated 1.74,A1c is slightly elevated ,Sodium is low 129,patient need to eat solid food ,drink Gatorade or Pedialyte,limit water intake.Mg is also low 1.2 need to continue taking Magnesium oxide twice daily.Tacrolimus still in process will call once reviewed, to call for any concerns,pt verbalized understanding.  .

## 2025-03-24 LAB — TACROLIMUS LVL: 2.7 NG/ML

## 2025-03-25 ENCOUNTER — TELEPHONE (OUTPATIENT)
Facility: CLINIC | Age: 70
End: 2025-03-25

## 2025-03-25 ENCOUNTER — TELEPHONE (OUTPATIENT)
Dept: NEPHROLOGY | Facility: CLINIC | Age: 70
End: 2025-03-25

## 2025-03-25 DIAGNOSIS — R19.7 DIARRHEA, UNSPECIFIED TYPE: Primary | ICD-10-CM

## 2025-03-25 NOTE — TELEPHONE ENCOUNTER
Patient calling  was admitted into hospital for 5 days due to nausea and diarrhea at [facility].  is still having symptoms. Please call.

## 2025-03-25 NOTE — TELEPHONE ENCOUNTER
Patient states that she is returning RN's call from today.  See 3/19/25 closed telephone encounter.  Please call

## 2025-03-26 ENCOUNTER — TELEPHONE (OUTPATIENT)
Dept: CARDIOLOGY | Age: 70
End: 2025-03-26

## 2025-03-26 NOTE — TELEPHONE ENCOUNTER
Dr. Das,  There is documentation visible in care everywhere from 3/15/25 under \"admission\".     CT and labs visible in care everywhere. Seems like pt was supposed to have an MRI (MRCP?) but it couldn't be done while in-patient (r/t no availability?) so pt was to have it done as outpatient. I don't see any report on that so I suspect pt did not ever have it.    Recent labs per [facility]/Dr. Falcon.    If you still need further records, I will contact [facility]. Let me know.  Thanks,  Brigida

## 2025-03-26 NOTE — TELEPHONE ENCOUNTER
Can we get records from [facility]? Difficult to know workup and recent labs etc to give further recommendations.

## 2025-03-26 NOTE — TELEPHONE ENCOUNTER
Dr. Das,   Pt was recently at [facility] x 5 days for viral gastroenteritis and ANDREY. Hx of kidney tx.  Pt discharged (a week ago?) and still c/o frequent watery diarrhea. No longer vomiting.    Pt denies fevers/abdominal pain/jaundice/pruritus/blood in stool. Pt having approximately 7 episodes of watery diarrhea daily. Is drinking fluids but unable to eat anything as she has diarrhea immediately afterwards.     CT showed some CBD dilation. Unsure if stool was tested while inpatient. Recent labs per nephrology. LOV with GI in 3/2024, EGD done on 10/31/24 and recs to f/u in office in 6 months but nothing ever scheduled.     Discussed staying hydrated and ER precautions for now. Please advise further.  Thanks,  Brigida

## 2025-03-27 ENCOUNTER — TELEPHONE (OUTPATIENT)
Dept: CARE COORDINATION | Age: 70
End: 2025-03-27

## 2025-03-27 NOTE — TELEPHONE ENCOUNTER
RN called and spoke to pt. Discussed MD recs per below. RN answered pt's questions. Recommending holding off on taking immodium until lab testing is resulted. Discussed ER precautions. Pt agreeable and verbalized understanding.

## 2025-03-27 NOTE — TELEPHONE ENCOUNTER
Stool testing ordered  Stay hydrated, small light meals  Pending results can determine further treatment

## 2025-03-28 ENCOUNTER — TELEPHONE (OUTPATIENT)
Dept: CARE COORDINATION | Age: 70
End: 2025-03-28

## 2025-03-31 ENCOUNTER — LAB ENCOUNTER (OUTPATIENT)
Dept: LAB | Age: 70
End: 2025-03-31
Attending: INTERNAL MEDICINE
Payer: COMMERCIAL

## 2025-03-31 DIAGNOSIS — R19.7 DIARRHEA, UNSPECIFIED TYPE: ICD-10-CM

## 2025-03-31 LAB — C DIFF TOX B STL QL: NEGATIVE

## 2025-03-31 PROCEDURE — 87493 C DIFF AMPLIFIED PROBE: CPT

## 2025-03-31 PROCEDURE — 87077 CULTURE AEROBIC IDENTIFY: CPT

## 2025-03-31 PROCEDURE — 87046 STOOL CULTR AEROBIC BACT EA: CPT

## 2025-03-31 PROCEDURE — 87045 FECES CULTURE AEROBIC BACT: CPT

## 2025-03-31 PROCEDURE — 87427 SHIGA-LIKE TOXIN AG IA: CPT

## 2025-03-31 PROCEDURE — 87015 SPECIMEN INFECT AGNT CONCNTJ: CPT

## 2025-04-02 ENCOUNTER — TELEPHONE (OUTPATIENT)
Facility: CLINIC | Age: 70
End: 2025-04-02

## 2025-04-03 ENCOUNTER — TELEPHONE (OUTPATIENT)
Dept: CARE COORDINATION | Age: 70
End: 2025-04-03

## 2025-04-03 ENCOUNTER — APPOINTMENT (OUTPATIENT)
Dept: CARDIOLOGY | Age: 70
End: 2025-04-03
Attending: SPECIALIST

## 2025-04-03 ENCOUNTER — CLINICAL DOCUMENTATION (OUTPATIENT)
Dept: CARDIOLOGY | Age: 70
End: 2025-04-03

## 2025-04-03 VITALS — HEIGHT: 65 IN | BODY MASS INDEX: 26.16 KG/M2 | WEIGHT: 157 LBS

## 2025-04-03 DIAGNOSIS — I10 PRIMARY HYPERTENSION: ICD-10-CM

## 2025-04-03 DIAGNOSIS — Z94.0 HISTORY OF RENAL TRANSPLANT (CMD): ICD-10-CM

## 2025-04-03 DIAGNOSIS — M79.602 LEFT ARM PAIN: ICD-10-CM

## 2025-04-03 PROCEDURE — 78452 HT MUSCLE IMAGE SPECT MULT: CPT | Performed by: SPECIALIST

## 2025-04-03 PROCEDURE — A9502 TC99M TETROFOSMIN: HCPCS | Performed by: SPECIALIST

## 2025-04-03 PROCEDURE — 93015 CV STRESS TEST SUPVJ I&R: CPT | Performed by: SPECIALIST

## 2025-04-03 RX ORDER — REGADENOSON 0.08 MG/ML
0.4 INJECTION, SOLUTION INTRAVENOUS ONCE
Status: COMPLETED | OUTPATIENT
Start: 2025-04-03 | End: 2025-04-03

## 2025-04-03 RX ADMIN — REGADENOSON 0.4 MG: 0.08 INJECTION, SOLUTION INTRAVENOUS at 08:25

## 2025-04-03 ASSESSMENT — EXERCISE STRESS TEST
PEAK_BP: 140/70
PEAK_BP: 130/64
PEAK_RPP: 8184
PEAK_BP: 124/60
STRESS_SYMPTOMS: HEADACHE
STRESS_SYMPTOMS: DYSPNEA
STAGE_CATEGORIES: RECOVERY 0
STOPPAGE_REASON: PROTOCOL COMPLETE
PEAK_BP: 138/64
PEAK_HR: 73
PEAK_HR: 71
PEAK_HR: 53
PEAK_HR: 66
PEAK_BP: 130/64
STAGE_CATEGORIES: RECOVERY 2
STAGE_CATEGORIES: 1
STAGE_CATEGORIES: RECOVERY 1
PEAK_RPP: 9490
PEAK_RPP: 7420
STAGE_CATEGORIES: RESTING
PEAK_RPP: 9798
STRESS_SYMPTOMS: DYSPNEA
PEAK_HR: 73
PEAK_RPP: 9490

## 2025-04-03 NOTE — TELEPHONE ENCOUNTER
Ok to overbook with you? Immodium daily but never more than 16 mg? Can recommend two tabs for initial dose then one tablet thereafter if needed?

## 2025-04-03 NOTE — TELEPHONE ENCOUNTER
RN called and spoke to pt. Pt scheduled for clinic appt on 5/7/25. Date, time and location verified with pt.    Discussed how to take immodium and instructed pt to call office for new or worsening symptoms. Pt agreeable and verbalized understanding.

## 2025-04-03 NOTE — TELEPHONE ENCOUNTER
Dr. Das,   Pt continues to have diarrhea daily. 6-7 episodes of watery diarrhea every day for approx 25 days. Stool tests were negative. No new meds per pt.    Pt denies fevers, N/V, abdominal pain, blood in stool. Does c/o rectal pain/burning with diarrhea. Feels exhausted. Is staying hydrated and drinks pedialyte daily.    Had held off on taking immodium until stool tests were done. Ok to take now?     Last colonoscopy done on 9/25/23. No polyps and mucosa normal at that time. Please advise.  Thanks,  Brigida

## 2025-04-04 ENCOUNTER — TELEPHONE (OUTPATIENT)
Dept: CARE COORDINATION | Age: 70
End: 2025-04-04

## 2025-04-04 LAB
HEART RATE RESERVE PREDICTED: 60.26 BPM
LV EF 2D ECHO EST: 87 %
RESTING HR ACHIEVED: 53 BPM
STRESS BASELINE BP: NORMAL MMHG
STRESS PEAK HR: 60 BPM
STRESS PERCENT HR: 40 %
STRESS POST EXERCISE DUR MIN: 1 MIN
STRESS POST PEAK BP: NORMAL MMHG
STRESS TARGET HR: 151 BPM

## 2025-04-10 ENCOUNTER — TELEPHONE (OUTPATIENT)
Facility: CLINIC | Age: 70
End: 2025-04-10

## 2025-04-10 ENCOUNTER — TELEPHONE (OUTPATIENT)
Dept: CARE COORDINATION | Age: 70
End: 2025-04-10

## 2025-04-10 NOTE — TELEPHONE ENCOUNTER
Patient states that she is trying to schedule an appointment with Infectious Disease and she needs a copy of the Stool results from 3/31/25.  Patient is requesting it to be mailed to her home.

## 2025-04-17 ENCOUNTER — TELEPHONE (OUTPATIENT)
Dept: CARE COORDINATION | Age: 70
End: 2025-04-17

## 2025-05-07 ENCOUNTER — OFFICE VISIT (OUTPATIENT)
Dept: GASTROENTEROLOGY | Facility: CLINIC | Age: 70
End: 2025-05-07

## 2025-05-07 VITALS
HEIGHT: 65 IN | BODY MASS INDEX: 24.83 KG/M2 | SYSTOLIC BLOOD PRESSURE: 130 MMHG | DIASTOLIC BLOOD PRESSURE: 70 MMHG | WEIGHT: 149 LBS

## 2025-05-07 DIAGNOSIS — K25.4 CHRONIC GASTRIC ULCER WITH HEMORRHAGE: Primary | ICD-10-CM

## 2025-05-07 DIAGNOSIS — K29.71 GASTRITIS WITH HEMORRHAGE, UNSPECIFIED CHRONICITY, UNSPECIFIED GASTRITIS TYPE: ICD-10-CM

## 2025-05-07 DIAGNOSIS — R19.7 DIARRHEA OF PRESUMED INFECTIOUS ORIGIN: ICD-10-CM

## 2025-05-07 DIAGNOSIS — K58.0 IRRITABLE BOWEL SYNDROME WITH DIARRHEA: ICD-10-CM

## 2025-05-07 DIAGNOSIS — K83.8 DILATED CBD, ACQUIRED: ICD-10-CM

## 2025-05-07 PROCEDURE — 3078F DIAST BP <80 MM HG: CPT | Performed by: INTERNAL MEDICINE

## 2025-05-07 PROCEDURE — 3008F BODY MASS INDEX DOCD: CPT | Performed by: INTERNAL MEDICINE

## 2025-05-07 PROCEDURE — 3075F SYST BP GE 130 - 139MM HG: CPT | Performed by: INTERNAL MEDICINE

## 2025-05-07 PROCEDURE — 99215 OFFICE O/P EST HI 40 MIN: CPT | Performed by: INTERNAL MEDICINE

## 2025-05-07 NOTE — PROGRESS NOTES
[facility] - Gastroenterology  Clinic Follow-up Visit    Chief Complaint   Patient presents with    Follow - Up     Diarrhea; taking imodium; no appetite; lost weight; heartburn          Subjective/HPI:   Mackenzie Nelson is a 70 year old female, patient of Dr. Nelson with history of reflux nephropathy s/p ureteral implantation with resultant renal failure requiring preemptive LRKT 2002( from her sister,  six antigen match at [facility] ),  now with CKD, HLD, Anemia, depression , gout , G1 DD, s/p covid for 5 mth in 2022. s/p monoclonal AB infusion who presents for diarrhea    Past Visit(s):  3/2024  Mackenzie Nelson is a 69 year old female, patient of Dr. Dr. Nelson with history of reflux nephropathy s/p ureteral implantation with resultant renal failure requiring preemptive LRKT 2002( from her sister,  six antigen match at [facility] ),  now with CKD, HLD, Anemia, depression , gout , G1 DD, s/p covid for 5 mth in 2022. s/p monoclonal AB infusion who presents for ANAHI and follow up for EGD/colonoscopy     Saw Alley Sierra  Mackenzie Nelson is a 68 year old year-old female with history of h/o reflux nephropathy s/p ureteral implantation with resultant renal failure requiring preemptive LRKT 2002( from her sister,  six antigen match at [facility] ),  now with CKD, HLD, Anemia, depression , gout , G1 DD, s/p covid for 5 mth in 2022. s/p monoclonal AB infusion who presents for evaluation of ANAHI.     Patient has completed 5 days of IV venofer.      GERD- She is on protonix. She will get epigastric pain that is very uncomfortable. She often is nausea daily. Can have episodes of vomiting, but they do not occur daily. Denies hematemesis. Denies dysphagia, globus, odynophagia.         Diarrhea- She has had stool testing done that is negative. She will have diarrhea about 10 mins after eating anything. She can have about 5 bowel movements per day. Nausea with food. No brbpr or melena. Feeling as though her stomach does bloat after eating. She does  complain of LLQ abdominal pain. Often is sharp and shooting and is associated with diarrhea. Denies fevers, chills and sick contacts. Patient is immunocompromised.       In office today, 05/07/25, pt presents for f/u.  Per patient thought norvirus march and then had ANDREY to 1.4 due to dehydration and then 1.74  RSV, influenza negative    C diff and stool cultures was negative  Direct bili normal  OSH with dilated duct    Overall, the patient feels still diarrhea. When takes the imodium two then better  Reflux a little worse-- epigastric and BL upper abdominal pain    GI symptoms denies  nausea, vomiting, dyspepsia, dysphagia, hematemesis, hematochezia, or melena. Additionally there is no change of appetite, unexpected weight loss, and no reported history of chest pain or shortness of breath.      Pertinent Surgical Hx:  - See additional surgical hx below  - No known issues with sedation.  - No known history of sleep apnea.    History, Medications, Allergies, ROS:      Past Medical History[1]   Past Surgical History[2]   Family History[3]   Social History: Short Social Hx on File[4]     Medications (Active prior to today's visit):  Current Medications[5]    Allergies:  Allergies[6]    ROS:   CONSTITUTIONAL:  negative for fevers, chills  EYES:  negative for change in vision  RESPIRATORY:  negative for  shortness of breath  CARDIOVASCULAR:  negative for  chest pain  GASTROINTESTINAL:  see HPI  GENITOURINARY:  negative for dysuria and hematuria   SKIN:  negative for  rash  ALLERGIC/IMMUNOLOGIC:  negative for hay fever allergies  ENDOCRINE:  negative for cold intolerance and heat intolerance  MUSCULOSKELETAL:  negative for  joint stiffness and joint swelling  BEHAVIOR/PSYCH:  negative for depressed mood    PHYSICAL EXAM:   Blood pressure 130/70, height 5' 5\" (1.651 m), weight 149 lb (67.6 kg).    Gen: patient appears comfortable and in no acute distress  HEENT: conjunctiva pink, the sclera appears anicteric, oropharynx  clear, mucus membranes appear moist  CV: the extremities are warm and well perfused   Lung: effort normal and breath sounds normal, no respiratory distress, wheezes or rales  GI: soft, non-tender exam in all quadrants without rigidity or guarding, non-distended, no abnormal bowel sounds noted, no masses are palpated  : no CVA tenderness  Skin: dry, warm, no jaundice  Ext: no cyanosis, clubbing or edema is evident.   Neuro: Alert and oriented x4, and patient is having movements of all 4 extremities   Psych: Pt has a normal mood and affect, behavior is normal    Nursing note and vitals reviewed      Labs/Imaging:     Patient's labs and imaging were reviewed and discussed with patient today.  See HPI and A&P for further details.     .  ASSESSMENT/PLAN:     1. Hx of chronic gastric ulcers  2. Dilated CBD  3. Diarrhea  4. Acid reflux    Recommend:  RUQ US and if dilated will consider MRI/MRCP pending improved ANDREY on CKD  Continue imodium as needed  Here are some reflux precautions:   - Avoid trigger foods  - Anti-reflux measures: raising the head of the bed at night, avoiding tight clothing or belts, avoiding eating late at night and not lying down shortly after mealtime and achieving weight loss   - Avoid NSAID's, caffeine, peppermints, alcohol, tobacco and foods that incite symptoms   PPI daily before breakfast        Orders This Visit:  No orders of the defined types were placed in this encounter.      Meds This Visit:  Requested Prescriptions      No prescriptions requested or ordered in this encounter       Imaging & Referrals:  None          [1]   Past Medical History:   Anemia    Anxiety state    Depression    Essential hypertension    Gout    High blood pressure    High cholesterol    Hyperlipidemia    IBS (irritable bowel syndrome)    Renal disorder    kidney transplant 21 years ago    Visual impairment   [2]   Past Surgical History:  Procedure Laterality Date          x1    Colonoscopy N/A 2023     Procedure: COLONOSCOPY;  Surgeon: Yrn Das MD;  Location: [facility] ENDOSCOPY    Egd  06/20/2024    Dr. Das; incomplete exam, food in stomach    Egd N/A 10/31/2024    Dr. Das    Hysterectomy      Kidney surgery      Kidney transplant    Other surgical history      had surgery on uteters to save kidney    Total abdom hysterectomy     [3]   Family History  Problem Relation Age of Onset    Diabetes Mother     Hypertension Mother     Other (Other) Brother    [4]   Social History  Socioeconomic History    Marital status:    Tobacco Use    Smoking status: Never    Smokeless tobacco: Never   Vaping Use    Vaping status: Never Used   Substance and Sexual Activity    Alcohol use: Not Currently    Drug use: Never     Social Drivers of Health     Food Insecurity: Low Risk  (3/16/2025)    Received from [facility]    Food Insecurity     Within the past 12 months, you worried that your food would run out before you got money to buy more.  : Never true     Within the past 12 months, the food you bought just didn't last and you didn't have money to get more. : Never true   Transportation Needs: Not At Risk (3/16/2025)    Received from [facility]    Transportation Needs     In the past 12 months, has lack of reliable transportation kept you from medical appointments, meetings, work or from getting things needed for daily living? : No   [5]   Current Outpatient Medications   Medication Sig Dispense Refill    Loperamide HCl (IMODIUM A-D OR) Take by mouth.      tacrolimus 1 MG Oral Cap Take 4 mg(4 tablets) by mouth in am and 3 mg (3 tablets) in the evening.      Mycophenolate Mofetil 500 MG Oral Tab Take 1 tablet (500 mg total) by mouth 2 (two) times daily.      PANTOPRAZOLE 40 MG Oral Tab EC TAKE 1 TABLET BY MOUTH 2 TIMES DAILY BEFORE MEALS. 180 tablet 2    aspirin 81 MG Oral Chew Tab Chew 1 tablet (81 mg total) by mouth daily.      colchicine 0.6 MG Oral Tab Take 1 tablet (0.6 mg total) by mouth  daily. (Patient taking differently: Take 1 tablet (0.6 mg total) by mouth as needed.)      metoprolol succinate ER 25 MG Oral Tablet 24 Hr Take 1 tablet (25 mg total) by mouth every evening.      sertraline 100 MG Oral Tab Take 1.5 tablets (150 mg total) by mouth daily.      cyanocobalamin 1000 MCG/ML Injection Solution Inject 1 mL (1,000 mcg total) into the muscle every 30 (thirty) days.      escitalopram 10 MG Oral Tab Take 1 tablet (10 mg total) by mouth daily.      Beclomethasone Diprop HFA (QVAR REDIHALER) 80 MCG/ACT Inhalation Aerosol, Breath Activated 1 puff.      Cholecalciferol (VITAMIN D3) 125 MCG (5000 UT) Oral Tablet Dispersible as directed Orally      allopurinol 100 MG Oral Tab Take 1 tablet (100 mg total) by mouth 2 (two) times daily.      losartan 100 MG Oral Tab Take 1 tablet (100 mg total) by mouth daily.      chlorthalidone 25 MG Oral Tab Take 0.5 tablets (12.5 mg total) by mouth daily.      traZODone 50 MG Oral Tab Take 1 tablet (50 mg total) by mouth nightly.      acetaminophen 325 MG Oral Tab Take 1 tablet (325 mg total) by mouth every 6 (six) hours as needed for Pain.     [6]   Allergies  Allergen Reactions    Dairy Products HIVES and DIARRHEA    Lactase DIARRHEA, RASH and HIVES    Codeine NAUSEA AND VOMITING

## 2025-05-07 NOTE — PATIENT INSTRUCTIONS
1. Hx of chronic gastric ulcers  2. Dilated CBD  3. Diarrhea  4. Acid reflux    Recommend:  RUQ US and if dilated will consider MRI/MRCP pending improved ANDREY on CKD  Continue imodium as needed  Here are some reflux precautions:   - Avoid trigger foods  - Anti-reflux measures: raising the head of the bed at night, avoiding tight clothing or belts, avoiding eating late at night and not lying down shortly after mealtime and achieving weight loss   - Avoid NSAID's, caffeine, peppermints, alcohol, tobacco and foods that incite symptoms   PPI daily before breakfast

## 2025-05-16 ENCOUNTER — TELEPHONE (OUTPATIENT)
Facility: CLINIC | Age: 70
End: 2025-05-16

## 2025-05-16 NOTE — TELEPHONE ENCOUNTER
The patient called to go over her results from the stool culture and the procedure. Please call.

## 2025-05-21 NOTE — TELEPHONE ENCOUNTER
RN called and spoke to pt. Informed her of stool test results and previous path results. Pt asked to have results mailed to her; verified home address.    Reviewed that US was ordered and not yet scheduled. Discussed in detail. Pt asked that US order be mailed to her also.    RN mailed results and order as requested. Included GI office fax number and central scheduling number. Pt verbalized understanding.

## 2025-05-29 ENCOUNTER — TELEPHONE (OUTPATIENT)
Facility: CLINIC | Age: 70
End: 2025-05-29

## 2025-05-29 NOTE — TELEPHONE ENCOUNTER
Patient is requesting for blood work order prefers to pick it up directly from the office and then will bring to the lab please call

## 2025-05-30 ENCOUNTER — TELEPHONE (OUTPATIENT)
Facility: CLINIC | Age: 70
End: 2025-05-30

## 2025-05-30 NOTE — TELEPHONE ENCOUNTER
Patient states, her son, Deonte will pick lab orders.  For additional questions please call.  Thank you.

## 2025-05-31 ENCOUNTER — LAB ENCOUNTER (OUTPATIENT)
Dept: LAB | Age: 70
End: 2025-05-31
Attending: INTERNAL MEDICINE
Payer: COMMERCIAL

## 2025-05-31 DIAGNOSIS — N18.30 CHRONIC KIDNEY DISEASE, STAGE III (MODERATE) (HCC): Primary | ICD-10-CM

## 2025-05-31 LAB
ANION GAP SERPL CALC-SCNC: 9 MMOL/L (ref 0–18)
BUN BLD-MCNC: 26 MG/DL (ref 9–23)
BUN/CREAT SERPL: 16.4 (ref 10–20)
CALCIUM BLD-MCNC: 9.7 MG/DL (ref 8.7–10.4)
CHLORIDE SERPL-SCNC: 94 MMOL/L (ref 98–112)
CO2 SERPL-SCNC: 26 MMOL/L (ref 21–32)
CREAT BLD-MCNC: 1.59 MG/DL (ref 0.55–1.02)
EGFRCR SERPLBLD CKD-EPI 2021: 35 ML/MIN/1.73M2 (ref 60–?)
FASTING STATUS PATIENT QL REPORTED: YES
GLUCOSE BLD-MCNC: 97 MG/DL (ref 70–99)
OSMOLALITY SERPL CALC.SUM OF ELEC: 273 MOSM/KG (ref 275–295)
POTASSIUM SERPL-SCNC: 3.9 MMOL/L (ref 3.5–5.1)
SODIUM SERPL-SCNC: 129 MMOL/L (ref 136–145)

## 2025-05-31 PROCEDURE — 80197 ASSAY OF TACROLIMUS: CPT

## 2025-05-31 PROCEDURE — 36415 COLL VENOUS BLD VENIPUNCTURE: CPT

## 2025-05-31 PROCEDURE — 80048 BASIC METABOLIC PNL TOTAL CA: CPT | Performed by: INTERNAL MEDICINE

## 2025-06-03 ENCOUNTER — OFFICE VISIT (OUTPATIENT)
Facility: CLINIC | Age: 70
End: 2025-06-03

## 2025-06-03 VITALS
WEIGHT: 148 LBS | BODY MASS INDEX: 25 KG/M2 | DIASTOLIC BLOOD PRESSURE: 62 MMHG | HEART RATE: 63 BPM | SYSTOLIC BLOOD PRESSURE: 112 MMHG

## 2025-06-03 DIAGNOSIS — I50.32 CHRONIC DIASTOLIC CONGESTIVE HEART FAILURE (HCC): ICD-10-CM

## 2025-06-03 DIAGNOSIS — M1A.9XX0 CHRONIC GOUT WITHOUT TOPHUS, UNSPECIFIED CAUSE, UNSPECIFIED SITE: ICD-10-CM

## 2025-06-03 DIAGNOSIS — E78.5 DYSLIPIDEMIA: ICD-10-CM

## 2025-06-03 DIAGNOSIS — I10 HYPERTENSION, UNSPECIFIED TYPE: ICD-10-CM

## 2025-06-03 DIAGNOSIS — D63.1 ANEMIA DUE TO STAGE 3 CHRONIC KIDNEY DISEASE, UNSPECIFIED WHETHER STAGE 3A OR 3B CKD (HCC): ICD-10-CM

## 2025-06-03 DIAGNOSIS — N18.30 STAGE 3 CHRONIC KIDNEY DISEASE, UNSPECIFIED WHETHER STAGE 3A OR 3B CKD (HCC): ICD-10-CM

## 2025-06-03 DIAGNOSIS — N18.30 ANEMIA DUE TO STAGE 3 CHRONIC KIDNEY DISEASE, UNSPECIFIED WHETHER STAGE 3A OR 3B CKD (HCC): ICD-10-CM

## 2025-06-03 DIAGNOSIS — B25.9 CYTOMEGALOVIRUS (CMV) VIREMIA (HCC): ICD-10-CM

## 2025-06-03 DIAGNOSIS — F32.A DEPRESSION, UNSPECIFIED DEPRESSION TYPE: ICD-10-CM

## 2025-06-03 DIAGNOSIS — Z48.22 ENCOUNTER FOR AFTERCARE FOLLOWING KIDNEY TRANSPLANT (HCC): Primary | ICD-10-CM

## 2025-06-03 PROCEDURE — 3078F DIAST BP <80 MM HG: CPT | Performed by: INTERNAL MEDICINE

## 2025-06-03 PROCEDURE — 3074F SYST BP LT 130 MM HG: CPT | Performed by: INTERNAL MEDICINE

## 2025-06-03 PROCEDURE — 3044F HG A1C LEVEL LT 7.0%: CPT | Performed by: INTERNAL MEDICINE

## 2025-06-03 PROCEDURE — 99215 OFFICE O/P EST HI 40 MIN: CPT | Performed by: INTERNAL MEDICINE

## 2025-06-03 NOTE — PROGRESS NOTES
[facility]    Progress Note     Mackenzie Nelson    69 y/o with remote h/o reflux nephropathy s/p ureteral implantation with resultant renal failure requiring preemptive LRKT 2002( from her sister,  six antigen match at [facility] ),  now with CKD, HLD, Anemia, depression , gout , G1 DD, s/p covid for 5 mth in 2022. s/p monoclonal AB infusion. previously seen by nephrologists but now established care at [facility].   Was in [place] and now back   she has no PCP- her  Dr Nelson manages most of her things. I have given names of PCP to her to establish care.'  after reviewing labs  in sept i had decreased her chlorthalidone to 12.5 mg/day. She is tolerating well, no edema and good BP    1/13/23: doing ok. feels fatigued and sleepy, thinks psych medication is contributing  7/18/23 had another episode of covid while travelling. Feeling v weak  10/30/23: here for fu. Had EGD/colonoscopy on 9/25/23- egd with gastric ulcer and colonoscopy unremarkable. Repeat egd planned. Colonoscopy in 10yrs  Feels fatigued and tired  1/8/23 new labs-sodium 134, k 3.5, cr 1.17 mg/dl, eGFR 51 ml/min  Since LOV had a passing out episode. Saw neurology. Scans been negative  Also had repeat egd- neg for precancerous and infection.  Also talked to ID for CMV and plan to monitor  Also saw dermatology     Since jan 2024  Went to [place]  On and off sick  Repeat egd on jun 20th  Echo was done jan 2024- grade 1 DD, ef normal    Since lov  No new issues feels tired  Never started statin    Since lov September 2024  In March 25 she was admitted at [facility] for diarrhea dehydration and ANDREY.  Her Tac levels were elevated and her tacrolimus was held.  We had resumed her back on her dose of 4 mg in a.m. and 3 mg in p.m. and Tac levels are pending    He has been complaining of dizziness   Also on and off diarrhea diarrhea  Low BP at home  Appetite was poor but is getting better  Stool testing ordered by Dr. Das is negative  Also with  weight loss    PMH  h/o reflux nephropathy s/p ureteral implantation with resultant renal failure requiring preemptive LRKT ( from her sister,  six antigen match at [facility]     HISTORY:  Past Medical History:    Anemia    Anxiety state    Depression    Essential hypertension    Gout    High blood pressure    High cholesterol    Hyperlipidemia    IBS (irritable bowel syndrome)    Renal disorder    kidney transplant 21 years ago    Visual impairment      Past Surgical History:   Procedure Laterality Date          x1    Colonoscopy N/A 2023    Procedure: COLONOSCOPY;  Surgeon: Yrn Das MD;  Location: [facility] ENDOSCOPY    Egd  2024    Dr. Das; incomplete exam, food in stomach    Egd N/A 10/31/2024    Dr. Das    Hysterectomy      Kidney surgery      Kidney transplant    Other surgical history      had surgery on uteters to save kidney    Total abdom hysterectomy             Medications (Active prior to today's visit):  Current Outpatient Medications   Medication Sig Dispense Refill    Loperamide HCl (IMODIUM A-D OR) Take by mouth.      tacrolimus 1 MG Oral Cap Take 4 mg(4 tablets) by mouth in am and 3 mg (3 tablets) in the evening.      Mycophenolate Mofetil 500 MG Oral Tab Take 1 tablet (500 mg total) by mouth 2 (two) times daily.      PANTOPRAZOLE 40 MG Oral Tab EC TAKE 1 TABLET BY MOUTH 2 TIMES DAILY BEFORE MEALS. 180 tablet 2    aspirin 81 MG Oral Chew Tab Chew 1 tablet (81 mg total) by mouth daily.      colchicine 0.6 MG Oral Tab Take 1 tablet (0.6 mg total) by mouth daily. (Patient taking differently: Take 1 tablet (0.6 mg total) by mouth as needed.)      metoprolol succinate ER 25 MG Oral Tablet 24 Hr Take 1 tablet (25 mg total) by mouth every evening.      sertraline 100 MG Oral Tab Take 1.5 tablets (150 mg total) by mouth daily.      cyanocobalamin 1000 MCG/ML Injection Solution Inject 1 mL (1,000 mcg total) into the muscle every 30 (thirty) days.      escitalopram 10 MG Oral Tab Take  1 tablet (10 mg total) by mouth daily.      Beclomethasone Diprop HFA (QVAR REDIHALER) 80 MCG/ACT Inhalation Aerosol, Breath Activated 1 puff.      Cholecalciferol (VITAMIN D3) 125 MCG (5000 UT) Oral Tablet Dispersible as directed Orally      allopurinol 100 MG Oral Tab Take 1 tablet (100 mg total) by mouth 2 (two) times daily.      losartan 100 MG Oral Tab Take 1 tablet (100 mg total) by mouth daily.      traZODone 50 MG Oral Tab Take 1 tablet (50 mg total) by mouth nightly.      acetaminophen 325 MG Oral Tab Take 1 tablet (325 mg total) by mouth every 6 (six) hours as needed for Pain.      chlorthalidone 25 MG Oral Tab Take 0.5 tablets (12.5 mg total) by mouth daily. (Patient not taking: Reported on 6/3/2025)       Taking mag oxide 400 mg daily  Allergies:  Allergies   Allergen Reactions    Dairy Products HIVES and DIARRHEA    Lactase DIARRHEA, RASH and HIVES    Codeine NAUSEA AND VOMITING         ROS:     Constitutional:  Negative for decreased activity, fever, irritability and lethargy  ENMT:  Negative for ear drainage, hearing loss and nasal drainage  Eyes:  Negative for eye discharge and vision loss  Cardiovascular:  Negative for chest pain, sob  Respiratory:  Negative for cough, dyspnea and wheezing  Gastrointestinal:  Negative for abdominal pain, constipation  Genitourinary:  Negative for dysuria and hematuria  Endocrine:  Negative for abnormal sleep patterns  Hema/Lymph:  Negative for easy bleeding and easy bruising  Integumentary:  Negative for pruritus and rash  Musculoskeletal:  Negative for bone/joint symptoms  Neurological:  Negative for gait disturbance  Psychiatric:  Negative for inappropriate interaction and psychiatric symptoms      Vitals:    06/03/25 1115   BP: 112/62   Pulse: 63         PHYSICAL EXAM:   Constitutional: appears well hydrated alert and responsive   Head/Face: normocephalic  Eyes/Vision: normal extraocular motion is intact  Nose/Mouth/Throat:mucous membranes are moist    Neck/Thyroid: neck is supple without adenopathy  Respiratory:  lungs are clear to auscultation bilaterally  Cardiovascular: regular rate and rhythm   Abdomen: soft, non-tender, non-distended, BS normal  Skin/Hair: no unusual rashes present, no abnormal bruising noted  Musculoskeletal: no deformities  Extremities: no edema  Neurological:  Grossly normal      Lab Results   Component Value Date     (L) 05/31/2025     (L) 03/21/2025     (L) 05/31/2024    K 3.9 05/31/2025    K 3.8 03/21/2025    K 4.0 05/31/2024    CL 94 (L) 05/31/2025    CL 96 (L) 03/21/2025     05/31/2024    CO2 26.0 05/31/2025    CO2 24.0 03/21/2025    CO2 24 05/31/2024    BUN 26 (H) 05/31/2025    BUN 34 (H) 03/21/2025    BUN 27 (H) 05/31/2024    CREATSERUM 1.59 (H) 05/31/2025    CREATSERUM 1.74 (H) 03/21/2025    CREATSERUM 1.27 (H) 05/31/2024    CA 9.7 05/31/2025    CA 9.5 03/21/2025    CA 9.4 05/31/2024    EGFRCR 35 (L) 05/31/2025    EGFRCR 31 (L) 03/21/2025    EGFRCR 46 (L) 05/31/2024    ALB 4.6 03/21/2025    ALB 4.2 05/31/2024    ALB 4.3 12/05/2023    PHOS 3.6 03/21/2025    PTH 84.0 03/21/2025 2014-2019 labs reviewed cr 1.2-1.5 mg/dl  sept and dec 2021- cr 1.4mg/dl, eGFR 38 ml/min  5/2022 -1.7 mg/dl   CMV/BKV 2021 negative  [place] UA 2021  tac level 6 in 2021  May 2022 labs cr 1.7 mg/dl , BUn 41,  eGFR  30 ml/min, hgb 10.3  11/8/2022: cr 1.5 mg/dl, eGFR 37 ml/min, all electrolytes ok, ldl 102, ipth 77, UPC negative, hgb 10.4, bland UA. low iron sat, vit b 12 ok, vit d ok, tac 6.6  7/18/2023 cr 1.3 mg/dl, eGFR 45 ml/min , k 3.9, mag 1.4, phosp 4.4, TSH 5.0 free t 4 1.1 (normal), hgb 11.1,iron saturation 12 %, ferritin 7, tacrolimus 5.3  , ,    All labs at [facility]     ASSESSMENT/PLAN:   Assessment   1. Encounter for aftercare following kidney transplant (HCC)  - Comp Metabolic Panel (14); Future  - CBC, Platelet; No Differential; Future  - Protein/Creatinine Ratio, Urine Random; Future  - Lipid Panel;  Future  - Tacrolimus (Immunoassay); Future  - Urinalysis with Culture Reflex; Future  - Magnesium; Future  - BK virus quantitative by PCR; Future  - CMV PCR Quantitative (Blood only); Future  - Iron And Tibc; Future  - Ferritin; Future  - Comp Metabolic Panel (14)  - CBC, Platelet; No Differential  - Lipid Panel  - Tacrolimus (Immunoassay)  - Urinalysis with Culture Reflex  - Magnesium  - Iron And Tibc  - Ferritin    2. Stage 3 chronic kidney disease, unspecified whether stage 3a or 3b CKD (HCC)    3. Hypertension, unspecified type    4. Anemia due to stage 3 chronic kidney disease, unspecified whether stage 3a or 3b CKD (HCC)    5. Chronic diastolic congestive heart failure (HCC)    6. Dyslipidemia    7. Depression, unspecified depression type    8. Cytomegalovirus (CMV) viremia (HCC)    9. 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 On chlorthalidone  Sodium is better- cr stable 1.2 mg/dl  Time of hospitalization in March creatinine at peak to 2.1 mg/dL but then improved to 1.5 mg/dL  5/31/2025-creatinine at 1.59 mg/dL and sodium at 129    H/o kidney transplant  s/p LRKT in 2002 - no post op complications, no rejections  on tacrolimus 4 mg in am and 3 mg in pm and cellcept 500 mg bid--> because of ongoing diarrhea will decrease CellCept to 1 tablet 250 mg by mouth twice a day  tac levels 4-6 range  Pending tolerance level    HTN  target BP < 140/90  on metoprolol, losartan and chlorthalidone (1/2 tablet)  + low salt diet  well  controlled  Blood pressure and dizziness and so will stop chlorthalidone    Anemia  iron def but cannot take PO iron  S/p EGD and colonoscopy with Dr Das (9/25/23) - gastric ulcer. On ppi and repeat EGD. Colonoscopy negative  vitamin b12 and FA acceptable  Low iron stores (sats 12 and ferritin 7) - s/p 5 doses iv iron--> improved iron and hgb    CHF  Grade 1 DD- follows with DR Melinda Lucas at [facility].  Compensated   On BB and diuretic  Repeat echo reviewed-Jan 2024- grade 1 DD, ef normal    Syncope  Saw Dr Lawrence at [facility]  grade 1 DD, ef normal    Hyperlipidemia  elevated FLP, have made dietary modification  not on any statin  Ldl better  Re prescribed statin with lft in one month ( I reordered)     Gout   On allopurinol  UA ok    Depression  Follows up with Dr Madiha Shabbir  needs therapy for 6 weeks- getting it done    Health maintain  she needs a pcp   recommend getting mammogram,  PAP. -has been losing weight as well  Saw derm for skin checkup    CMV viremia  Low levels  She did talk to ID and plan is to monitor  Rechecking CMV    GERD/ulcers  Following up with GI      PLAN  Because of ongoing diarrhea with  unclear etiologyI will decrease CellCept to 250 mg by mouth twice a day.  Also because of hyponatremia, low blood pressure ANDREY and dizziness we will stop her chlorthalidone.  Have asked to check her blood pressure at home and if remains low then she can decrease the losartan to 50 mg/day  In 4 weeks we will repeat all the labs (call her with all the results)  Also checking CMV and BK    Follow-up in 3 to 4 months (earlier if the repeat labs are not better)     Orders This Visit:  Orders Placed This Encounter   Procedures    Comp Metabolic Panel (14)    CBC, Platelet; No Differential    Protein/Creatinine Ratio, Urine Random    Lipid Panel    Tacrolimus (Immunoassay)    Urinalysis with Culture Reflex    Magnesium    BK virus quantitative by PCR    CMV PCR Quantitative (Blood only)    Iron And Tibc     Ferritin       Meds This Visit:  Requested Prescriptions      No prescriptions requested or ordered in this encounter       Imaging & Referrals:  None   Spent 65 minutes obtaining history, evaluating patient, reviewing labs, discussing treatment options and completing documentation     Debbie Falcon MD  6/3/2025

## 2025-06-03 NOTE — PATIENT INSTRUCTIONS
cellcept to 1 tablet ( 250 mg by mouth)  twice a day  If diarrhea improves we will continue same dose.  Stop chlorthalidone all together.check for edema  Check daily blood pressure  If Blood pressure remains less than 110/70 mmhg and dizzy then decrease losartan to 50 mg /day

## 2025-06-05 LAB — TACROLIMUS LVL: 10.3 NG/ML

## 2025-06-10 ENCOUNTER — TELEPHONE (OUTPATIENT)
Facility: CLINIC | Age: 70
End: 2025-06-10

## 2025-06-10 RX ORDER — LOSARTAN POTASSIUM 50 MG/1
50 TABLET ORAL DAILY
Qty: 90 TABLET | Refills: 1 | Status: SHIPPED | OUTPATIENT
Start: 2025-06-10

## 2025-06-10 RX ORDER — MYCOPHENOLATE MOFETIL 250 MG/1
250 CAPSULE ORAL
Qty: 180 CAPSULE | Refills: 1 | Status: SHIPPED | OUTPATIENT
Start: 2025-06-10

## 2025-06-10 NOTE — TELEPHONE ENCOUNTER
Reviewed tac levels and called her       Tacrolimus lvs are high ( 10)    Take tacrolimus 2 mg twice a day (instead of 3 mg bid)       Labs in 3-4 weeks     Also called in new prescription for losartan 50 mg/day  Mycophenolate 250 mg bid

## 2025-06-13 ENCOUNTER — TELEPHONE (OUTPATIENT)
Facility: CLINIC | Age: 70
End: 2025-06-13

## 2025-06-13 NOTE — TELEPHONE ENCOUNTER
RN called and spoke to pt. Pt states she called to schedule an US but was told that there was no order seen.  US abdomen ordered on 5/7/25.     RN called central scheduling, they state the order is visible but it says ordering provider is Dr. Valenzuela and authorizing provider is Dr. Das. Unsure why order has two MD names but  states pt CAN schedule test.    RN informed pt that order is in system and she should be able to schedule test. Pt verbalized understanding and was appreciative of assistance.

## 2025-06-13 NOTE — TELEPHONE ENCOUNTER
The patient called to ask if the order for her gallbladder was placed. States she was at the lab and they did not find the order.

## 2025-06-26 ENCOUNTER — TELEPHONE (OUTPATIENT)
Dept: NEPHROLOGY | Facility: CLINIC | Age: 70
End: 2025-06-26

## 2025-06-26 RX ORDER — LOSARTAN POTASSIUM 50 MG/1
50 TABLET ORAL 2 TIMES DAILY
COMMUNITY

## 2025-06-26 RX ORDER — TACROLIMUS 1 MG/1
CAPSULE ORAL
COMMUNITY

## 2025-06-26 NOTE — TELEPHONE ENCOUNTER
Please ask her to increase her losartan to 50 mg twice a day since BP is high    I had reduced losartan to once a day bc of LOW blood pressure. But now if the blood pressure is high > 140 /90 mmhg we can increase losartan to 50 mg twice a day     Let me know the readings after the weekend if still HIGH after increasing losartan      Also she needs to please let me know when she does her blood work

## 2025-06-26 NOTE — TELEPHONE ENCOUNTER
Dr Falcon please see note below spoke to patient  and last name verified reported blood pressure has been high since meds were adjusted .ranges from 150's /80's,has headache once in a while,denies any dizziness ,palpitations,numbness or chest pain.  Is on Losartan 50 mg daily ,Metoprolol er 25 mg daily ,off Chlorthalidone it's been almost a month now that dose was changed .  Advised to continue monitor bp and hr daily if symptoms worse to go to ER ,patient verbalized understanding.

## 2025-06-26 NOTE — TELEPHONE ENCOUNTER
Informed patient of the note below  and last name verified verbalized understanding medlist updated .    Debbie Falcon MD      6/10/25  5:18 PM  Note      Reviewed tac levels and called her         Tacrolimus lvs are high ( 10)     Take tacrolimus 2 mg twice a day (instead of 3 mg bid)         Labs in 3-4 weeks      Also called in new prescription for losartan 50 mg/day  Mycophenolate 250 mg bid

## 2025-06-26 NOTE — TELEPHONE ENCOUNTER
Patient states that she would like to provide her blood pressure readings. Patient states that her blood pressure has been running high.     6/25/2025 - 150/80   6/26/25 - 160/76

## 2025-07-11 ENCOUNTER — TELEPHONE (OUTPATIENT)
Facility: CLINIC | Age: 70
End: 2025-07-11

## 2025-07-11 NOTE — TELEPHONE ENCOUNTER
Dr Falcon please see note below spoke to patient  and last name verified reported blood pressure has been high  -178/80 60       -140/75 64  711-190/90 before med        -170/80 64 after med        157/78 64 -mid day checked.  Felt dizzy ,denied chest pain or numbness,anxious is taking Losartan 50 mg twice daily,Metoprolol succinate ER 25mg daily . Advised pt is symptoms worse to go to ER ,pt verbalized understanding.

## 2025-07-11 NOTE — TELEPHONE ENCOUNTER
If BP remains elevated despite taking her metoprolol and losartan 50 mg bid  Then we will add amlodipine 5 mg /day     Will call her as well ( left  voicemail)     She needs to do blood work next week

## 2025-07-11 NOTE — TELEPHONE ENCOUNTER
Patient called in to give blood pressure numbers states is is running higher than usual please call last reading was 170/80 pulse 60 attempting Rn now

## 2025-07-14 RX ORDER — LOSARTAN POTASSIUM 50 MG/1
50 TABLET ORAL 2 TIMES DAILY
Qty: 180 TABLET | Refills: 1 | Status: SHIPPED | OUTPATIENT
Start: 2025-07-14

## 2025-07-14 RX ORDER — AMLODIPINE BESYLATE 5 MG/1
5 TABLET ORAL DAILY
Qty: 30 TABLET | Refills: 2 | Status: SHIPPED | OUTPATIENT
Start: 2025-07-14

## 2025-07-14 NOTE — TELEPHONE ENCOUNTER
Rn informed patient of the note below  and last name verified ,reported bp still high in 165's /80's.Advised to check bp and HR to call for reading in 1-2 weeks  with the new med added.Rn sent prescription.

## 2025-07-18 ENCOUNTER — TELEPHONE (OUTPATIENT)
Dept: NEPHROLOGY | Facility: CLINIC | Age: 70
End: 2025-07-18

## 2025-07-21 ENCOUNTER — HOSPITAL ENCOUNTER (OUTPATIENT)
Dept: ULTRASOUND IMAGING | Age: 70
Discharge: HOME | End: 2025-07-21
Attending: INTERNAL MEDICINE
Payer: COMMERCIAL

## 2025-07-21 DIAGNOSIS — K83.8 DILATED CBD, ACQUIRED: ICD-10-CM

## 2025-07-21 PROCEDURE — 76705 ECHO EXAM OF ABDOMEN: CPT | Performed by: INTERNAL MEDICINE

## 2025-07-21 NOTE — TELEPHONE ENCOUNTER
Dr Falcon spoke to patient and informed of the of the result that patient labs have improved kidneys back to 49%,Mg is low, reported is taking Mag oxide 400mg daily ,bp is getting better will call back for the readings,noted feet has minimal edema ,no sob,is on new med Amlodipine 5 mg daily, advised to monitor as one of Amlodipine side effect is edema , keep legs elevated as possible ,verbalized understanding.  Bp readings  7//76 65  7//68 65.

## 2025-07-23 NOTE — TELEPHONE ENCOUNTER
Freya from [facility] pharmacy is requesting for mycophenolate mofetil 250 MG Oral Cap

## 2025-07-24 RX ORDER — MYCOPHENOLATE MOFETIL 250 MG/1
250 CAPSULE ORAL
Qty: 180 CAPSULE | Refills: 1 | Status: SHIPPED | OUTPATIENT
Start: 2025-07-24

## 2025-07-25 ENCOUNTER — TELEPHONE (OUTPATIENT)
Facility: CLINIC | Age: 70
End: 2025-07-25

## 2025-07-25 NOTE — TELEPHONE ENCOUNTER
Rn spoke to Winnie and informed need the blood sample for the test .stated she will call patient to do the lab.

## 2025-07-25 NOTE — TELEPHONE ENCOUNTER
Winnie from [facility] would like clarification on the test would the office like to use urine or blood please follow up it is urgent attempted to call high priority line not available

## 2025-07-30 ENCOUNTER — TELEPHONE (OUTPATIENT)
Facility: CLINIC | Age: 70
End: 2025-07-30

## 2025-07-31 ENCOUNTER — TELEPHONE (OUTPATIENT)
Dept: NEPHROLOGY | Facility: CLINIC | Age: 70
End: 2025-07-31

## 2025-08-22 ENCOUNTER — TELEPHONE (OUTPATIENT)
Facility: CLINIC | Age: 70
End: 2025-08-22

## 2025-08-25 RX ORDER — MAGNESIUM OXIDE 400 MG/1
400 TABLET ORAL 2 TIMES DAILY
Qty: 180 TABLET | Refills: 1 | Status: SHIPPED | OUTPATIENT
Start: 2025-08-25

## 2025-08-25 RX ORDER — MAGNESIUM OXIDE 400 MG/1
400 TABLET ORAL 2 TIMES DAILY
Qty: 180 TABLET | Refills: 1 | Status: SHIPPED | OUTPATIENT
Start: 2025-08-25 | End: 2025-08-25

## (undated) DEVICE — YANKAUER,BULB TIP,W/O VENT,RIGID,STERILE: Brand: MEDLINE

## (undated) DEVICE — KIT CLEAN ENDOKIT 1.1OZ GOWNX2

## (undated) DEVICE — MEDI-VAC NON-CONDUCTIVE SUCTION TUBING 6MM X 1.8M (6FT.) L: Brand: CARDINAL HEALTH

## (undated) DEVICE — 60 ML SYRINGE REGULAR TIP: Brand: MONOJECT

## (undated) DEVICE — KIT ENDO ORCAPOD 160/180/190

## (undated) DEVICE — GIJAW SINGLE-USE BIOPSY FORCEPS WITH NEEDLE: Brand: GIJAW

## (undated) DEVICE — MEDI-VAC NON-CONDUCTIVE SUCTION TUBING: Brand: CARDINAL HEALTH

## (undated) DEVICE — SYRINGE, LUER SLIP, STERILE, 60ML: Brand: MEDLINE

## (undated) DEVICE — CO2 CANNULA,SSOFT,ADLT,7O2,4CO2,FEMALE: Brand: MEDLINE

## (undated) DEVICE — CONMED SCOPE SAVER BITE BLOCK, 20X27 MM: Brand: SCOPE SAVER

## (undated) DEVICE — Device: Brand: DUAL NARE NASAL CANNULAE FEMALE LUER CON 7FT O2 TUBE

## (undated) DEVICE — FORCEPS BX L240CM DIA2.4MM L NDL RAD JAW 4

## (undated) DEVICE — YANKAUER SUCTION INSTRUMENT NO CONTROL VENT, BULB TIP, CLEAR: Brand: YANKAUER

## (undated) NOTE — LETTER
[address], [place], [place]  Authorization for Surgical Operation and Procedure                                                                                           I hereby authorize Haider Rich MD, my physician and his/her assistants (if applicable), which may include medical students, residents, and/or fellows, to perform the following surgical operation/ procedure and administer such anesthesia as may be determined necessary by my physician: Operation/Procedure name (s) COLONOSCOPY/ ESOPHAGOGASTRODUODENOSCOPY on [facility]   2. I recognize that during the surgical operation/procedure, unforeseen conditions may necessitate additional or different procedures than those listed above. I, therefore, further authorize and request that the above-named surgeon, assistants, or designees perform such procedures as are, in their judgment, necessary and desirable. 3.   My surgeon/physician has discussed prior to my surgery the potential benefits, risks and side effects of this procedure; the likelihood of achieving goals; and potential problems that might occur during recuperation. They also discussed reasonable alternatives to the procedure, including risks, benefits, and side effects related to the alternatives and risks related to not receiving this procedure. I have had all my questions answered and I acknowledge that no guarantee has been made as to the result that may be obtained. 4.   Should the need arise during my operation/procedure, which includes change of level of care prior to discharge, I also consent to the administration of blood and/or blood products. Further, I understand that despite careful testing and screening of blood or blood products by collecting agencies, I may still be subject to ill effects as a result of receiving a blood transfusion and/or blood products.   The following are some, but not all, of the potential risks that can occur: fever and allergic reactions, hemolytic reactions, transmission of diseases such as Hepatitis, AIDS and Cytomegalovirus (CMV) and fluid overload. In the event that I wish to have an autologous transfusion of my own blood, or a directed donor transfusion, I will discuss this with my physician. Check only if Refusing Blood or Blood Products  I understand refusal of blood or blood products as deemed necessary by my physician may have serious consequences to my condition to include possible death. I hereby assume responsibility for my refusal and release the hospital, its personnel, and my physicians from any responsibility for the consequences of my refusal.    o  Refuse   5. I authorize the use of any specimen, organs, tissues, body parts or foreign objects that may be removed from my body during the operation/procedure for diagnosis, research or teaching purposes and their subsequent disposal by hospital authorities. I also authorize the release of specimen test results and/or written reports to my treating physician on the hospital medical staff or other referring or consulting physicians involved in my care, at the discretion of the Pathologist or my treating physician. 6.   I consent to the photographing or videotaping of the operations or procedures to be performed, including appropriate portions of my body for medical, scientific, or educational purposes, provided my identity is not revealed by the pictures or by descriptive texts accompanying them. If the procedure has been photographed/videotaped, the surgeon will obtain the original picture, image, videotape or CD. The hospital will not be responsible for storage, release or maintenance of the picture, image, tape or CD.    7.   I consent to the presence of a  or observers in the operating room as deemed necessary by my physician or their designees.     8.   I recognize that in the event my procedure results in extended X-Ray/fluoroscopy time, I may develop a skin reaction. 9. If I have a Do Not Attempt Resuscitation (DNAR) order in place, that status will be suspended while in the operating room, procedural suite, and during the recovery period unless otherwise explicitly stated by me (or a person authorized to consent on my behalf). The surgeon or my attending physician will determine when the applicable recovery period ends for purposes of reinstating the DNAR order. 10. Patients having a sterilization procedure: I understand that if the procedure is successful the results will be permanent and it will therefore be impossible for me to inseminate, conceive, or bear children. I also understand that the procedure is intended to result in sterility, although the result has not been guaranteed. 11. I acknowledge that my physician has explained sedation/analgesia administration to me including the risk and benefits I consent to the administration of sedation/analgesia as may be necessary or desirable in the judgment of my physician. I CERTIFY THAT I HAVE READ AND FULLY UNDERSTAND THE ABOVE CONSENT TO OPERATION and/or OTHER PROCEDURE.     _________________________________________ _________________________________     ___________________________________  Signature of Patient     Signature of Responsible Person                   Printed Name of Responsible Person                              _________________________________________ ______________________________        ___________________________________  Signature of Witness         Date  Time         Relationship to Patient    STATEMENT OF PHYSICIAN My signature below affirms that prior to the time of the procedure; I have explained to the patient and/or his/her legal representative, the risks and benefits involved in the proposed treatment and any reasonable alternative to the proposed treatment.  I have also explained the risks and benefits involved in refusal of the proposed treatment and alternatives to the proposed treatment and have answered the patient's questions.  If I have a significant financial interest in a co-management agreement or a significant financial interest in any product or implant, or other significant relationship used in this procedure/surgery, I have disclosed this and had a discussion with my patient.     _______________________________________________________________ _____________________________  Howard Scott Physician)                                                                                         (Date)                                   (Time)  Patient Name: Jaymie Nelson    : 3/9/1955   Printed: 2023      Medical Record #: [id number]                                              Page 1 of 1

## (undated) NOTE — LETTER
[facility]  Administration of Anesthesia  Mackenzie YANG agree to be cared for by a physician anesthesiologist alone and/or with a nurse anesthetist, who is specially trained to monitor me and give me medicine to put me to sleep or keep me comfortable during my procedure    I understand that my anesthesiologist and/or anesthetist is not an employee or agent of [facility] or [facility] [employer]. He or she works for [facility]    As the patient asking for anesthesia services, I agree to:  Allow the anesthesiologist (anesthesia doctor) to give me medicine and do additional procedures as necessary. Some examples are: Starting or using an “IV” to give me medicine, fluids or blood during my procedure, and having a breathing tube placed to help me breathe when I’m asleep (intubation). In the event that my heart stops working properly, I understand that my anesthesiologist will make every effort to sustain my life, unless otherwise directed by [facility] Do Not Resuscitate documents.  Tell my anesthesia doctor before my procedure:  If I am pregnant.  The last time that I ate or drank.  iii. All of the medicines I take (including prescriptions, herbal supplements, and pills I can buy without a prescription (including street drugs/illegal medications). Failure to inform my anesthesiologist about these medicines may increase my risk of anesthetic complications.  iv.If I am allergic to anything or have had a reaction to anesthesia before.  I understand how the anesthesia medicine will help me (benefits).  I understand that with any type of anesthesia medicine there are risks:  The most common risks are: nausea, vomiting, sore throat, muscle soreness, damage to my eyes, mouth, or teeth (from breathing tube placement).  Rare risks include: remembering what happened during my procedure, allergic reactions to medications, injury to my airway, heart, lungs, vision, nerves, or muscles  and in extremely rare instances death.  My doctor has explained to me other choices available to me for my care (alternatives).  Pregnant Patients (“epidural”):  I understand that the risks of having an epidural (medicine given into my back to help control pain during labor), include itching, low blood pressure, difficulty urinating, headache or slowing of the baby’s heart. Very rare risks include infection, bleeding, seizure, irregular heart rhythms and nerve injury.  Regional Anesthesia (“spinal”, “epidural”, & “nerve blocks”):  I understand that rare but potential complications include headache, bleeding, infection, seizure, irregular heart rhythms, and nerve injury.    _____________________________________________________________________________  Patient (or Representative) Signature/Relationship to Patient  Date   Time    _____________________________________________________________________________   Name (if used)    Language/Organization   Time    _____________________________________________________________________________  Nurse Anesthetist Signature     Date   Time  _____________________________________________________________________________  Anesthesiologist Signature     Date   Time  I have discussed the procedure and information above with the patient (or patient’s representative) and answered their questions. The patient or their representative has agreed to have anesthesia services.    _____________________________________________________________________________  Witness        Date   Time  I have verified that the signature is that of the patient or patient’s representative, and that it was signed before the procedure  Patient Name: Mackenzie Nelson     : 3/9/1955                 Printed: 10/30/2024 at 6:34 AM    Medical Record #: [id number]                                            Page 1 of 1  ----------ANESTHESIA CONSENT----------

## (undated) NOTE — LETTER
[facility]  [address], [place], [place]    Authorization for Surgical Operation and Procedure                               I hereby authorize Yrn Das MD, my physician and his/her assistants (if applicable), which may include medical students, residents, and/or fellows, to perform the following surgical operation/ procedure and administer such anesthesia as may be determined necessary by my physician: Operation/Procedure name (s) ESOPHAGOGASTRODUODENOSCOPY on [facility]   2.   I recognize that during the surgical operation/procedure, unforeseen conditions may necessitate additional or different procedures than those listed above.  I, therefore, further authorize and request that the above-named surgeon, assistants, or designees perform such procedures as are, in their judgment, necessary and desirable.    3.   My surgeon/physician has discussed prior to my surgery the potential benefits, risks and side effects of this procedure; the likelihood of achieving goals; and potential problems that might occur during recuperation.  They also discussed reasonable alternatives to the procedure, including risks, benefits, and side effects related to the alternatives and risks related to not receiving this procedure.  I have had all my questions answered and I acknowledge that no guarantee has been made as to the result that may be obtained.    4.   Should the need arise during my operation/procedure, which includes change of level of care prior to discharge, I also consent to the administration of blood and/or blood products.  Further, I understand that despite careful testing and screening of blood or blood products by collecting agencies, I may still be subject to ill effects as a result of receiving a blood transfusion and/or blood products.  The following are some, but not all, of the potential risks that can occur: fever and allergic reactions, hemolytic reactions, transmission of diseases  such as Hepatitis, AIDS and Cytomegalovirus (CMV) and fluid overload.  In the event that I wish to have an autologous transfusion of my own blood, or a directed donor transfusion, I will discuss this with my physician.  Check only if Refusing Blood or Blood Products  I understand refusal of blood or blood products as deemed necessary by my physician may have serious consequences to my condition to include possible death. I hereby assume responsibility for my refusal and release the hospital, its personnel, and my physicians from any responsibility for the consequences of my refusal.    o  Refuse   5.   I authorize the use of any specimen, organs, tissues, body parts or foreign objects that may be removed from my body during the operation/procedure for diagnosis, research or teaching purposes and their subsequent disposal by hospital authorities.  I also authorize the release of specimen test results and/or written reports to my treating physician on the hospital medical staff or other referring or consulting physicians involved in my care, at the discretion of the Pathologist or my treating physician.    6.   I consent to the photographing or videotaping of the operations or procedures to be performed, including appropriate portions of my body for medical, scientific, or educational purposes, provided my identity is not revealed by the pictures or by descriptive texts accompanying them.  If the procedure has been photographed/videotaped, the surgeon will obtain the original picture, image, videotape or CD.  The hospital will not be responsible for storage, release or maintenance of the picture, image, tape or CD.    7.   I consent to the presence of a  or observers in the operating room as deemed necessary by my physician or their designees.    8.   I recognize that in the event my procedure results in extended X-Ray/fluoroscopy time, I may develop a skin reaction.    9. If I have a Do Not Attempt  Resuscitation (DNAR) order in place, that status will be suspended while in the operating room, procedural suite, and during the recovery period unless otherwise explicitly stated by me (or a person authorized to consent on my behalf). The surgeon or my attending physician will determine when the applicable recovery period ends for purposes of reinstating the DNAR order.  10. Patients having a sterilization procedure: I understand that if the procedure is successful the results will be permanent and it will therefore be impossible for me to inseminate, conceive, or bear children.  I also understand that the procedure is intended to result in sterility, although the result has not been guaranteed.   11. I acknowledge that my physician has explained sedation/analgesia administration to me including the risk and benefits I consent to the administration of sedation/analgesia as may be necessary or desirable in the judgment of my physician.    I CERTIFY THAT I HAVE READ AND FULLY UNDERSTAND THE ABOVE CONSENT TO OPERATION and/or OTHER PROCEDURE.     ____________________________________  _________________________________        ______________________________  Signature of Patient    Signature of Responsible Person                Printed Name of Responsible Person                                      ____________________________________  _____________________________                ________________________________  Signature of Witness        Date  Time         Relationship to Patient    STATEMENT OF PHYSICIAN My signature below affirms that prior to the time of the procedure; I have explained to the patient and/or his/her legal representative, the risks and benefits involved in the proposed treatment and any reasonable alternative to the proposed treatment. I have also explained the risks and benefits involved in refusal of the proposed treatment and alternatives to the proposed treatment and have answered the patient's  questions. If I have a significant financial interest in a co-management agreement or a significant financial interest in any product or implant, or other significant relationship used in this procedure/surgery, I have disclosed this and had a discussion with my patient.     _____________________________________________________              _____________________________  (Signature of Physician)                                                                                         (Date)                                   (Time)  Patient Name: Mackenzie Nelson      : 3/9/1955      Printed: 2024     Medical Record #: [id number]                                      Page 1 of 1

## (undated) NOTE — LETTER
11/21/2024          Mackenzie Nelson        [address] [place] [address]         Dear Mackenzie,    This letter is to inform you that our office has made several attempts to reach you by phone without success.  We were attempting to contact you by phone to review below result message and recommendations from Dr. Yrn Das:    \"No Hpylori noted  Healed ulcer  Deformed antrum likely from prior ulcers  Follow up in the office in 6 months\"    Please contact our office at the number listed below as soon as you receive this letter to discuss this issue and to make the necessary changes in our system to your contact information.  Thank you for your cooperation.        Sincerely,    Gastroenterology Clinical Staff  [facility]  [address]  [facility] [address] [phone]

## (undated) NOTE — LETTER
[facility]  Administration of Anesthesia  Mackenzie YANG agree to be cared for by a physician anesthesiologist alone and/or with a nurse anesthetist, who is specially trained to monitor me and give me medicine to put me to sleep or keep me comfortable during my procedure    I understand that my anesthesiologist and/or anesthetist is not an employee or agent of [facility] or [employer]. He or she works for [facility]    As the patient asking for anesthesia services, I agree to:  Allow the anesthesiologist (anesthesia doctor) to give me medicine and do additional procedures as necessary. Some examples are: Starting or using an “IV” to give me medicine, fluids or blood during my procedure, and having a breathing tube placed to help me breathe when I’m asleep (intubation). In the event that my heart stops working properly, I understand that my anesthesiologist will make every effort to sustain my life, unless otherwise directed by [facility] Do Not Resuscitate documents.  Tell my anesthesia doctor before my procedure:  If I am pregnant.  The last time that I ate or drank.  iii. All of the medicines I take (including prescriptions, herbal supplements, and pills I can buy without a prescription (including street drugs/illegal medications). Failure to inform my anesthesiologist about these medicines may increase my risk of anesthetic complications.  iv.If I am allergic to anything or have had a reaction to anesthesia before.  I understand how the anesthesia medicine will help me (benefits).  I understand that with any type of anesthesia medicine there are risks:  The most common risks are: nausea, vomiting, sore throat, muscle soreness, damage to my eyes, mouth, or teeth (from breathing tube placement).  Rare risks include: remembering what happened during my procedure, allergic reactions to medications, injury to my airway, heart, lungs, vision, nerves, or muscles  and in extremely rare instances death.  My doctor has explained to me other choices available to me for my care (alternatives).  Pregnant Patients (“epidural”):  I understand that the risks of having an epidural (medicine given into my back to help control pain during labor), include itching, low blood pressure, difficulty urinating, headache or slowing of the baby’s heart. Very rare risks include infection, bleeding, seizure, irregular heart rhythms and nerve injury.  Regional Anesthesia (“spinal”, “epidural”, & “nerve blocks”):  I understand that rare but potential complications include headache, bleeding, infection, seizure, irregular heart rhythms, and nerve injury.    _____________________________________________________________________________  Patient (or Representative) Signature/Relationship to Patient  Date   Time    _____________________________________________________________________________   Name (if used)    Language/Organization   Time    _____________________________________________________________________________  Nurse Anesthetist Signature     Date   Time  _____________________________________________________________________________  Anesthesiologist Signature     Date   Time  I have discussed the procedure and information above with the patient (or patient’s representative) and answered their questions. The patient or their representative has agreed to have anesthesia services.    _____________________________________________________________________________  Witness        Date   Time  I have verified that the signature is that of the patient or patient’s representative, and that it was signed before the procedure  Patient Name: Mackenzie Nelson     : 3/9/1955                 Printed: 2024 at 6:57 AM    Medical Record #: [id number]                                            Page 1 of 1  ----------ANESTHESIA CONSENT----------

## (undated) NOTE — LETTER
12/6/2023              Mackenzie Nelson        [address] [address]         Dear Jaymie Mcnamara,    [facility] records indicate that the tests ordered for you by Lela Resendiz MD  have not been done. If you have, in fact, already completed the tests or you do not wish to have the tests done, please contact our office at [address] [place] [address]. Otherwise, please proceed with the testing. To schedule a test at any [facility], call [facility] at [phone], Monday through Friday between 7:30am to 6pm and on Saturday between 8am and 1pm.   Evening and weekend appointments for your exam are available. [email or web address] at [phone] to schedule this test order. Labs and Imaging order   XR ABDOMEN (1 VIEW) (CPT=74018)   CBC, Platelet;  No Differential  Ferritin  Iron And Tibc      Sincerely,    Lela Resendiz MD  [facility], [facility] [place]  [address] [id number] [phone]